# Patient Record
Sex: FEMALE | Race: BLACK OR AFRICAN AMERICAN | HISPANIC OR LATINO | Employment: FULL TIME | URBAN - METROPOLITAN AREA
[De-identification: names, ages, dates, MRNs, and addresses within clinical notes are randomized per-mention and may not be internally consistent; named-entity substitution may affect disease eponyms.]

---

## 2017-01-06 ENCOUNTER — ROUTINE PRENATAL (OUTPATIENT)
Dept: OBGYN | Facility: CLINIC | Age: 22
End: 2017-01-06
Payer: MEDICAID

## 2017-01-06 VITALS — DIASTOLIC BLOOD PRESSURE: 60 MMHG | WEIGHT: 179 LBS | BODY MASS INDEX: 31.21 KG/M2 | SYSTOLIC BLOOD PRESSURE: 110 MMHG

## 2017-01-06 DIAGNOSIS — Z34.81 ENCOUNTER FOR SUPERVISION OF OTHER NORMAL PREGNANCY IN FIRST TRIMESTER: Primary | ICD-10-CM

## 2017-01-06 DIAGNOSIS — O28.3 ABNORMAL PREGNANCY US: ICD-10-CM

## 2017-01-06 PROCEDURE — 90471 IMMUNIZATION ADMIN: CPT | Performed by: PHYSICIAN ASSISTANT

## 2017-01-06 PROCEDURE — 90715 TDAP VACCINE 7 YRS/> IM: CPT | Performed by: PHYSICIAN ASSISTANT

## 2017-01-06 PROCEDURE — 90040 PR PRENATAL FOLLOW UP: CPT | Performed by: PHYSICIAN ASSISTANT

## 2017-01-06 NOTE — MR AVS SNAPSHOT
Ronit Tripathi   2017 3:30 PM   Routine Prenatal   MRN: [id number]    [facility]   Dept Phone:  [phone]    Description:  Female : 1995   Provider:  ZIA Diaz           Allergies as of 2017     No Known Allergies      You were diagnosed with     Encounter for supervision of other normal pregnancy in first trimester   [6675256]  -  Primary     Abnormal pregnancy US   [[id number]]         Vital Signs     Blood Pressure Weight Last Menstrual Period Smoking Status          110/60 mmHg 81.194 kg (179 lb) 2016 Former Smoker        Basic Information     Date Of Birth Sex Race Ethnicity Preferred Language    1995 Female Other, Black or   Origin (Serbian,Citizen of [place],Bermudian,P[place]ua [place]inean, etc) English      Problem List              ICD-10-CM Priority Class Noted - Resolved    Supervision of normal pregnancy Z34.90   10/14/2014 - Present    Abnormal pregnancy US-bean choroid plexus cysts, referral to [facility] O28.3   2016 - Present      Health Maintenance        Date Due Completion Dates    IMM HEP B VACCINE (1 of 3 - Primary Series) 1995 ---    IMM HEP A VACCINE (1 of 2 - Standard Series) 5/3/1994 ---    IMM HPV VACCINE (1 of 3 - Female 3 Dose Series) 5/3/2004 ---    IMM VARICELLA (CHICKENPOX) VACCINE (1 of 2 - 2 Dose Adolescent Series) 5/3/2006 ---    IMM INFLUENZA (1) 2010    PAP SMEAR 2019, 10/14/2014, 2010    IMM DTaP/Tdap/Td Vaccine (3 - Td) 2026, 3/6/2015, 2012, 2010            Current Immunizations     Influenza TIV (IM) 2010 11:15 PM    Pneumococcal polysaccharide vaccine (PPSV-23) 2015 12:57 AM    Tdap Vaccine  Incomplete, 2016  1:08 AM, 3/6/2015  3:50 PM, 2012  2:00 PM, 2010 11:15 PM      Below and/or attached are the medications your provider expects you to take. Review all of your home medications and newly ordered medications with your  provider and/or pharmacist. Follow medication instructions as directed by your provider and/or pharmacist. Please keep your medication list with you and share with your provider. Update the information when medications are discontinued, doses are changed, or new medications (including over-the-counter products) are added; and carry medication information at all times in the event of emergency situations     Allergies:  No Known Allergies          Medications  Valid as of: January 06, 2017 -  4:09 PM    Generic Name Brand Name Tablet Size Instructions for use    BuPROPion HCl (TABLET SR 12 HR) WELLBUTRIN- MG Take 1 Tab by mouth 2 times a day.        Docusate Sodium (Cap)  MG Take 100 mg by mouth 2 times a day as needed for Constipation.        Docusate Sodium (Cap) COLACE 100 MG Take 1 Cap by mouth 2 times a day.        Ferrous Sulfate (Tab) ferrous sulfate 325 (65 FE) MG Take 1 Tab by mouth every morning with breakfast.        Ibuprofen (Tab) MOTRIN 600 MG Take 1 Tab by mouth every 6 hours as needed (Cramping).        MetroNIDAZOLE (Tab) FLAGYL 500 MG Patient to take 2gm PO x one dose.        Oxycodone-Acetaminophen (Tab) PERCOCET 5-325 MG Take 1-2 Tabs by mouth every four hours as needed for Moderate Pain.        Prenatal Multivit-Min-Fe-FA (Tab) Prenatal Vitamins 0.8 MG Take 1 Tab by mouth every day.        Prenatal MV-Min-Fe Fum-FA-DHA   Take  by mouth.        .                 Medicines prescribed today were sent to:     [facility]/PHARMACY #[id number] - [place], [place] - [address]    [address] [facility] [address]    Phone: [phone] Fax: [phone]    Open 24 Hours?: No      Medication refill instructions:       If your prescription bottle indicates you have medication refills left, it is not necessary to call your provider’s office. Please contact your pharmacy and they will refill your medication.    If your prescription bottle indicates you do not have any refills left, you may request  refills at any time through one of the following ways: The online [employer] system (except Urgent Care), by calling your provider’s office, or by asking your pharmacy to contact your provider’s office with a refill request. Medication refills are processed only during regular business hours and may not be available until the next business day. Your provider may request additional information or to have a follow-up visit with you prior to refilling your medication.   *Please Note: Medication refills are assigned a new Rx number when refilled electronically. Your pharmacy may indicate that no refills were authorized even though a new prescription for the same medication is available at the pharmacy. Please request the medicine by name with the pharmacy before contacting your provider for a refill.        Other Notes About Your Plan     GIRL - Estela           MyChart Status: Patient Declined

## 2017-01-06 NOTE — PROGRESS NOTES
OB f/u. + fetal movement.  No VB, LOF or UC's.  C/o on lower abdominal pain, sides  Wt:  179lb     BP: 110/60  Good phone # [phone]  Preferred pharmacy confirmed.  Tdap offered  BTL signed   1 GTT not done yet. Will do Monday.  Kick count sheet given today.  PANN appt not yet scheduled. Per P[facility] they left msg on 11/16/16 but patient did not call. Next opening not until February

## 2017-01-06 NOTE — Clinical Note
"Count Your Baby's Movements  Another step to a healthy delivery    Ronit Tripathi             Dept: [phone]    How Many Weeks Pregnant? 28w6d    Date to Begin Countin17              How to use this chart    One way for your physician to keep track of your baby's health is by knowing how often the baby moves (or \"kicks\") in your womb.  You can help your physician to do this by using this chart every day.    Every day, you should see how many hours it takes for your baby to move 10 times.  Start in the morning, as soon as you get up.    · First, write down the time your baby moves until you get to 10.  · Check off one box every time your baby moves until you get to 10.  · Write down the time you finished counting in the last column.  · Total how long it took to count up all 10 movements.  · Finally, fill in the box that shows how long this took.  After counting 10 movements, you no longer have to count any more that day.  The next morning, just start counting again as soon as you get up.    What should you call a \"movement\"?  It is hard to say, because it will feel different from one mother to another and from one pregnancy to the next.  The important thing is that you count the movements the same way throughout your pregnancy.  If you have more questions, you should ask your physician.    Count carefully every day!  SAMPLE:  Week 28    How many hours did it take to feel 10 movements?       Start  Time     1     2     3     4     5     6     7     8     9     10   Finish Time   Mon 8:20 ·  ·  ·  ·  ·  ·  ·  ·  ·  ·  11:40                  Sat               Sun                 IMPORTANT: You should contact your physician if it takes more than two hours for you to feel 10 movements.  Each morning, write down the time and start to count the movements of your baby.  Keep track by checking off one box every time you feel one movement.  When you have felt " "10 \"kicks\", write down the time you finished counting in the last column.  Then fill in the   box (over the check li) for the number of hours it took.  Be sure to read the complete instructions on the previous page.            "

## 2017-01-07 NOTE — PROGRESS NOTES
Pt has no complaints with cramping, UCs, Vb, LOF, but pt is feeling a lot of pressure and pain in hips and low back. +FM - FKC sheet given today. TDaP given today. BTL consent signed today - pt is SURE she wants this. No appt made with PANN, so pt urged to call again and make appt for Feb. Pt will do. Pt also to do 1hr GTT next week. RTC 3 wk or sooner prn.

## 2017-01-24 ENCOUNTER — HOSPITAL ENCOUNTER (OUTPATIENT)
Dept: LAB | Facility: MEDICAL CENTER | Age: 22
End: 2017-01-24
Attending: PHYSICIAN ASSISTANT
Payer: MEDICAID

## 2017-01-24 DIAGNOSIS — O28.3 ABNORMAL PREGNANCY US: ICD-10-CM

## 2017-01-24 LAB
GLUCOSE 1H P 50 G GLC PO SERPL-MCNC: 107 MG/DL (ref 70–139)
HCT VFR BLD AUTO: 38.2 % (ref 37–47)
HGB BLD-MCNC: 11.8 G/DL (ref 12–16)
TREPONEMA PALLIDUM IGG+IGM AB [PRESENCE] IN SERUM OR PLASMA BY IMMUNOASSAY: NON REACTIVE

## 2017-01-24 PROCEDURE — 86780 TREPONEMA PALLIDUM: CPT

## 2017-01-24 PROCEDURE — 82950 GLUCOSE TEST: CPT

## 2017-01-24 PROCEDURE — 85018 HEMOGLOBIN: CPT

## 2017-01-24 PROCEDURE — 36415 COLL VENOUS BLD VENIPUNCTURE: CPT

## 2017-01-24 PROCEDURE — 85014 HEMATOCRIT: CPT

## 2017-01-26 ENCOUNTER — ROUTINE PRENATAL (OUTPATIENT)
Dept: OBGYN | Facility: CLINIC | Age: 22
End: 2017-01-26
Payer: MEDICAID

## 2017-01-26 DIAGNOSIS — O28.3 ABNORMAL PREGNANCY US: ICD-10-CM

## 2017-01-26 DIAGNOSIS — Z34.83 ENCOUNTER FOR SUPERVISION OF OTHER NORMAL PREGNANCY IN THIRD TRIMESTER: Primary | ICD-10-CM

## 2017-01-26 DIAGNOSIS — O36.5990 IUGR, ANTENATAL: ICD-10-CM

## 2017-01-26 PROCEDURE — 90040 PR PRENATAL FOLLOW UP: CPT | Performed by: NURSE PRACTITIONER

## 2017-01-26 NOTE — MR AVS SNAPSHOT
Ronit Tripathi   2017 3:45 PM   Routine Prenatal   MRN: [id number]    Department[facility]   Dept Phone:  [phone]    Description:  Female : 1995   Provider:  Anuradha Lopez C.N.M.           Allergies as of 2017     No Known Allergies      You were diagnosed with     Encounter for supervision of other normal pregnancy in third trimester   [2608625]  -  Primary     Abnormal pregnancy US   [[id number]]         Vital Signs     Blood Pressure Weight Last Menstrual Period Smoking Status          108/74 mmHg 81.647 kg (180 lb) 2016 Former Smoker        Basic Information     Date Of Birth Sex Race Ethnicity Preferred Language    1995 Female Other, Black or   Origin (English,Papua [place]inean,Tajik,Alexy, etc) English      Problem List              ICD-10-CM Priority Class Noted - Resolved    Supervision of normal pregnancy Z34.90   10/14/2014 - Present    Abnormal pregnancy US-bean choroid plexus cysts, referral to PANN O28.3   2016 - Present      Health Maintenance        Date Due Completion Dates    IMM HEP B VACCINE (1 of 3 - Primary Series) 1995 ---    IMM HEP A VACCINE (1 of 2 - Standard Series) 5/3/1994 ---    IMM HPV VACCINE (1 of 3 - Female 3 Dose Series) 5/3/2004 ---    IMM VARICELLA (CHICKENPOX) VACCINE (1 of 2 - 2 Dose Adolescent Series) 5/3/2006 ---    IMM INFLUENZA (1) 2010    PAP SMEAR 2019, 10/14/2014, 2010    IMM DTaP/Tdap/Td Vaccine (3 - Td) 2027, 2016, 3/6/2015, 2012, 2010            Current Immunizations     Influenza TIV (IM) 2010 11:15 PM    Pneumococcal polysaccharide vaccine (PPSV-23) 2015 12:57 AM    Tdap Vaccine 2017  4:07 PM, 2016  1:08 AM, 3/6/2015  3:50 PM, 2012  2:00 PM, 2010 11:15 PM      Below and/or attached are the medications your provider expects you to take. Review all of your home medications and newly ordered  medications with your provider and/or pharmacist. Follow medication instructions as directed by your provider and/or pharmacist. Please keep your medication list with you and share with your provider. Update the information when medications are discontinued, doses are changed, or new medications (including over-the-counter products) are added; and carry medication information at all times in the event of emergency situations     Allergies:  No Known Allergies          Medications  Valid as of: January 26, 2017 -  4:23 PM    Generic Name Brand Name Tablet Size Instructions for use    .                 Medicines prescribed today were sent to:     [facility]/PHARMACY #[id number] - [place], [place] - [address]    [address] [facility] [address]    Phone: [phone] Fax: [phone]    Open 24 Hours?: No      Medication refill instructions:       If your prescription bottle indicates you have medication refills left, it is not necessary to call your provider’s office. Please contact your pharmacy and they will refill your medication.    If your prescription bottle indicates you do not have any refills left, you may request refills at any time through one of the following ways: The online [employer] system (except Urgent Care), by calling your provider’s office, or by asking your pharmacy to contact your provider’s office with a refill request. Medication refills are processed only during regular business hours and may not be available until the next business day. Your provider may request additional information or to have a follow-up visit with you prior to refilling your medication.   *Please Note: Medication refills are assigned a new Rx number when refilled electronically. Your pharmacy may indicate that no refills were authorized even though a new prescription for the same medication is available at the pharmacy. Please request the medicine by name with the pharmacy before contacting your provider for a refill.           Other Notes About Your Plan     GIRL - Estela           MyChart Status: Patient Declined

## 2017-01-26 NOTE — PROGRESS NOTES
Pt here today for OB follow up  Pt states no complaints.   Reports +FM  WT: 180lb  BP: 108/74  Good # [phone]  Pt states was told by cecelia, they had no referral.

## 2017-01-27 NOTE — PROGRESS NOTES
S:  Pt is  at 31w5d for routine OB follow up.  No c/o.  Reports good FM.  Denies VB, LOF, RUCs or vaginal DC. Has not yet made appt w [facility].      O:  Please see above vitals.        FHTs: 143        Fundal ht: 31 cm.    A:  IUP at 31w5d  Patient Active Problem List    Diagnosis Date Noted   • Abnormal pregnancy US-bean choroid plexus cysts, referral to [facility] 2016   • Supervision of normal pregnancy 10/14/2014        P:  1.  GBS @ 35 wks.          2.  Continue FKCs.          3.  Questions answered.          4.  Encouraged pt to tour L&D.          5.  Encourage adequate water intake.        6.  F/u 2 wks.        7.  Labs are wnl.

## 2017-01-27 NOTE — PATIENT INSTRUCTIONS
P:  1.  GBS @ 35 wks.          2.  Continue FKCs.          3.  Questions answered.          4.  Encouraged pt to tour L&D.          5.  Encourage adequate water intake.        6.  F/u 2 wks.        7.  Labs are wnl.

## 2017-02-08 VITALS — DIASTOLIC BLOOD PRESSURE: 74 MMHG | WEIGHT: 180 LBS | SYSTOLIC BLOOD PRESSURE: 108 MMHG | BODY MASS INDEX: 31.38 KG/M2

## 2017-02-08 PROBLEM — O36.5990 IUGR, ANTENATAL: Status: ACTIVE | Noted: 2017-02-08

## 2017-02-14 ENCOUNTER — ROUTINE PRENATAL (OUTPATIENT)
Dept: OBGYN | Facility: CLINIC | Age: 22
End: 2017-02-14
Payer: MEDICAID

## 2017-02-14 DIAGNOSIS — Z34.83 ENCOUNTER FOR SUPERVISION OF OTHER NORMAL PREGNANCY IN THIRD TRIMESTER: Primary | ICD-10-CM

## 2017-02-14 PROCEDURE — 90040 PR PRENATAL FOLLOW UP: CPT | Performed by: PHYSICIAN ASSISTANT

## 2017-02-14 NOTE — MR AVS SNAPSHOT
Ronit Tripathi   2017 3:15 PM   Routine Prenatal   MRN: [id number]    [facility]  [facility]   Dept Phone:  [phone]    Description:  Female : 1995   Provider:  ZIA Diaz           Allergies as of 2017     No Known Allergies      Vital Signs     Blood Pressure Weight Last Menstrual Period Smoking Status          106/60 mmHg 81.647 kg (180 lb) 2016 Former Smoker        Basic Information     Date Of Birth Sex Race Ethnicity Preferred Language    1995 Female Other, Black or   Origin (Gabonese,Emirati,Norwegian,Alexy, etc) English      Problem List              ICD-10-CM Priority Class Noted - Resolved    Supervision of normal pregnancy Z34.90   10/14/2014 - Present    possible IUGR in development - PANN ff-up on 3/3/17 P05.9   2017 - Present      Health Maintenance        Date Due Completion Dates    IMM HEP B VACCINE (1 of 3 - Primary Series) 1995 ---    IMM HEP A VACCINE (1 of 2 - Standard Series) 5/3/1994 ---    IMM HPV VACCINE (1 of 3 - Female 3 Dose Series) 5/3/2004 ---    IMM VARICELLA (CHICKENPOX) VACCINE (1 of 2 - 2 Dose Adolescent Series) 5/3/2006 ---    IMM INFLUENZA (1) 2010    PAP SMEAR 2019, 10/14/2014, 2010    IMM DTaP/Tdap/Td Vaccine (3 - Td) 2027, 2016, 3/6/2015, 2012, 2010            Current Immunizations     Influenza TIV (IM) 2010 11:15 PM    Pneumococcal polysaccharide vaccine (PPSV-23) 2015 12:57 AM    Tdap Vaccine 2017  4:07 PM, 2016  1:08 AM, 3/6/2015  3:50 PM, 2012  2:00 PM, 2010 11:15 PM      Below and/or attached are the medications your provider expects you to take. Review all of your home medications and newly ordered medications with your provider and/or pharmacist. Follow medication instructions as directed by your provider and/or pharmacist. Please keep your medication list with you and share with your  provider. Update the information when medications are discontinued, doses are changed, or new medications (including over-the-counter products) are added; and carry medication information at all times in the event of emergency situations     Allergies:  No Known Allergies          Medications  Valid as of: February 14, 2017 -  3:31 PM    Generic Name Brand Name Tablet Size Instructions for use    .                 Medicines prescribed today were sent to:     [facility]/PHARMACY #[id number] - [place], [place] - [address]    [address] [facility] [place] [address]    Phone: [phone] Fax: [phone]    Open 24 Hours?: No      Medication refill instructions:       If your prescription bottle indicates you have medication refills left, it is not necessary to call your provider’s office. Please contact your pharmacy and they will refill your medication.    If your prescription bottle indicates you do not have any refills left, you may request refills at any time through one of the following ways: The online [employer] system (except Urgent Care), by calling your provider’s office, or by asking your pharmacy to contact your provider’s office with a refill request. Medication refills are processed only during regular business hours and may not be available until the next business day. Your provider may request additional information or to have a follow-up visit with you prior to refilling your medication.   *Please Note: Medication refills are assigned a new Rx number when refilled electronically. Your pharmacy may indicate that no refills were authorized even though a new prescription for the same medication is available at the pharmacy. Please request the medicine by name with the pharmacy before contacting your provider for a refill.        Other Notes About Your Plan     [facility] - [facility] Status: Patient Declined

## 2017-02-14 NOTE — PROGRESS NOTES
Pt has no complaints with cramping, UCs, VB, LOF. PANN appt shows suspected IUGR with AC lag on 2/6, so has f/u for growth in 3 wks. No mention of choroid plexus cysts or other anomalies. +FM. GBS next visit. PTL precautions reviewed. RTC 2 wk or sooner prn.

## 2017-02-14 NOTE — PROGRESS NOTES
Pt here today for OB follow up  Pt states no complaints   Reports +FM  WT:180lb  BP:106/60  Good # [phone]

## 2017-02-28 ENCOUNTER — ROUTINE PRENATAL (OUTPATIENT)
Dept: OBGYN | Facility: CLINIC | Age: 22
End: 2017-02-28
Payer: MEDICAID

## 2017-02-28 ENCOUNTER — HOSPITAL ENCOUNTER (OUTPATIENT)
Facility: MEDICAL CENTER | Age: 22
End: 2017-02-28
Attending: OBSTETRICS & GYNECOLOGY
Payer: MEDICAID

## 2017-02-28 VITALS — WEIGHT: 179 LBS | SYSTOLIC BLOOD PRESSURE: 126 MMHG | DIASTOLIC BLOOD PRESSURE: 80 MMHG | BODY MASS INDEX: 31.21 KG/M2

## 2017-02-28 DIAGNOSIS — Z34.83 ENCOUNTER FOR SUPERVISION OF OTHER NORMAL PREGNANCY IN THIRD TRIMESTER: ICD-10-CM

## 2017-02-28 PROCEDURE — 87653 STREP B DNA AMP PROBE: CPT

## 2017-02-28 PROCEDURE — 90040 PR PRENATAL FOLLOW UP: CPT | Performed by: OBSTETRICS & GYNECOLOGY

## 2017-02-28 NOTE — PROGRESS NOTES
21 y.o.  36w3d The patient is here for routine obstetrical followup. She is without complaints and reports good fetal movement. She denies vaginal bleeding or leaking of fluid.  C/o frequent contractions since last night.     The patient's pregnancy is complicated by   Patient Active Problem List    Diagnosis Date Noted   • possible IUGR in development - PANN ff-up on 3/3/17 2017   • Supervision of normal pregnancy 10/14/2014     GBS obtained.   Has f/u growth US with PANN on 3/3/17    Pt requests stripping of membranes.  I counseled her that this is not indicated, as she is still .    Followup in 1 week  Labor precautions were discussed with patient  Fetal kick counts were discussed with patient

## 2017-02-28 NOTE — MR AVS SNAPSHOT
Ronit Tripathi   2017 3:00 PM   Routine Prenatal   MRN: [id number]    Department[facility]  [facility]   Dept Phone:  [phone]    Description:  Female : 1995   Provider:  Martina Luu M.D.           Allergies as of 2017     No Known Allergies      You were diagnosed with     Encounter for supervision of other normal pregnancy in third trimester   [9859858]         Vital Signs     Blood Pressure Weight Last Menstrual Period Smoking Status          126/80 mmHg 81.194 kg (179 lb) 2016 Former Smoker        Basic Information     Date Of Birth Sex Race Ethnicity Preferred Language    1995 Female Other, Black or   Origin (Thai,Turkmen,Uzbek,Alexy, etc) English      Problem List              ICD-10-CM Priority Class Noted - Resolved    Supervision of normal pregnancy Z34.90   10/14/2014 - Present    possible IUGR in development - PANN ff-up on 3/3/17 P05.9   2017 - Present      Health Maintenance        Date Due Completion Dates    IMM HEP B VACCINE (1 of 3 - Primary Series) 1995 ---    IMM HEP A VACCINE (1 of 2 - Standard Series) 5/3/1994 ---    IMM HPV VACCINE (1 of 3 - Female 3 Dose Series) 5/3/2004 ---    IMM VARICELLA (CHICKENPOX) VACCINE (1 of 2 - 2 Dose Adolescent Series) 5/3/2006 ---    IMM INFLUENZA (1) 2010    PAP SMEAR 2019, 10/14/2014, 2010    IMM DTaP/Tdap/Td Vaccine (3 - Td) 2027, 2016, 3/6/2015, 2012, 2010            Current Immunizations     Influenza TIV (IM) 2010 11:15 PM    Pneumococcal polysaccharide vaccine (PPSV-23) 2015 12:57 AM    Tdap Vaccine 2017  4:07 PM, 2016  1:08 AM, 3/6/2015  3:50 PM, 2012  2:00 PM, 2010 11:15 PM      Below and/or attached are the medications your provider expects you to take. Review all of your home medications and newly ordered medications with your provider and/or pharmacist. Follow medication  instructions as directed by your provider and/or pharmacist. Please keep your medication list with you and share with your provider. Update the information when medications are discontinued, doses are changed, or new medications (including over-the-counter products) are added; and carry medication information at all times in the event of emergency situations     Allergies:  No Known Allergies          Medications  Valid as of: February 28, 2017 -  3:40 PM    Generic Name Brand Name Tablet Size Instructions for use    .                 Medicines prescribed today were sent to:     [facility]/PHARMACY #[id number] - [place], [place] - [address]    [address] [facility] [place] [address]    Phone: [phone] Fax: [phone]    Open 24 Hours?: No      Medication refill instructions:       If your prescription bottle indicates you have medication refills left, it is not necessary to call your provider’s office. Please contact your pharmacy and they will refill your medication.    If your prescription bottle indicates you do not have any refills left, you may request refills at any time through one of the following ways: The online [employer] system (except Urgent Care), by calling your provider’s office, or by asking your pharmacy to contact your provider’s office with a refill request. Medication refills are processed only during regular business hours and may not be available until the next business day. Your provider may request additional information or to have a follow-up visit with you prior to refilling your medication.   *Please Note: Medication refills are assigned a new Rx number when refilled electronically. Your pharmacy may indicate that no refills were authorized even though a new prescription for the same medication is available at the pharmacy. Please request the medicine by name with the pharmacy before contacting your provider for a refill.        Your To Do List     Future Labs/Procedures Complete By Expires     GRP B STREP, BY PCR (HAMMOND BROTH)  As directed 2/28/2018    Comments:    Source: vaginal/rectal      Other Notes About Your Plan     GIRL - Estela           MyChart Status: Patient Declined

## 2017-02-28 NOTE — PROGRESS NOTES
OB f/u. + fetal movement.  No VB, LOF  c/o UC's 5 minutes apart this morning.  Good phone # [phone]  Preferred pharmacy confirmed.  GBS today

## 2017-03-02 LAB — GP B STREP DNA SPEC QL NAA+PROBE: NEGATIVE

## 2017-03-07 ENCOUNTER — ROUTINE PRENATAL (OUTPATIENT)
Dept: OBGYN | Facility: CLINIC | Age: 22
End: 2017-03-07
Payer: MEDICAID

## 2017-03-07 VITALS — BODY MASS INDEX: 31.38 KG/M2 | DIASTOLIC BLOOD PRESSURE: 70 MMHG | SYSTOLIC BLOOD PRESSURE: 120 MMHG | WEIGHT: 180 LBS

## 2017-03-07 DIAGNOSIS — H00.11 CHALAZION OF RIGHT UPPER EYELID: ICD-10-CM

## 2017-03-07 PROCEDURE — 90040 PR PRENATAL FOLLOW UP: CPT | Performed by: NURSE PRACTITIONER

## 2017-03-07 NOTE — PROGRESS NOTES
Pt here for OB/FU Reports Good Fetal Movement.  GBS Neg   Pt states she was told by us that she had a PANN appt on 3/3/17, when she showed up for that appt she was told by [facility] that her appt was on 3/1/17-so she was rescheduled for 3/14/17.  Pt c/o frequent U/Cs, denies any other complications.   # [phone]

## 2017-03-07 NOTE — PROGRESS NOTES
Has been kyara quite a bit more today.  Recommend she get something to eat, take a long walk and if increase in contractions go to hospital.  Continue daily kick counts.

## 2017-03-08 ENCOUNTER — HOSPITAL ENCOUNTER (OUTPATIENT)
Facility: MEDICAL CENTER | Age: 22
End: 2017-03-08
Attending: OBSTETRICS & GYNECOLOGY | Admitting: OBSTETRICS & GYNECOLOGY
Payer: MEDICAID

## 2017-03-08 VITALS
BODY MASS INDEX: 29.99 KG/M2 | TEMPERATURE: 97.7 F | SYSTOLIC BLOOD PRESSURE: 134 MMHG | WEIGHT: 180 LBS | HEIGHT: 65 IN | HEART RATE: 112 BPM | DIASTOLIC BLOOD PRESSURE: 79 MMHG

## 2017-03-08 PROCEDURE — 59025 FETAL NON-STRESS TEST: CPT | Performed by: NURSE PRACTITIONER

## 2017-03-09 NOTE — PROGRESS NOTES
2052: Pt presents to L&D for contractions.  External monitors x2 applied.  VSS.  SVE 4/80/-2 by CLAY Silva.  2155: SVE unchanged.  Report to RAJI Lopez CNM, pt can be discharged home with discharge instructions.  Discharge instructions given to pt, all questions answered.

## 2017-03-13 ENCOUNTER — HOSPITAL ENCOUNTER (INPATIENT)
Facility: MEDICAL CENTER | Age: 22
LOS: 2 days | End: 2017-03-15
Attending: OBSTETRICS & GYNECOLOGY | Admitting: OBSTETRICS & GYNECOLOGY
Payer: MEDICAID

## 2017-03-13 LAB
APPEARANCE UR: CLEAR
BASOPHILS # BLD AUTO: 0.4 % (ref 0–1.8)
BASOPHILS # BLD: 0.08 K/UL (ref 0–0.12)
COLOR UR AUTO: YELLOW
EOSINOPHIL # BLD AUTO: 0.04 K/UL (ref 0–0.51)
EOSINOPHIL NFR BLD: 0.2 % (ref 0–6.9)
ERYTHROCYTE [DISTWIDTH] IN BLOOD BY AUTOMATED COUNT: 38.4 FL (ref 35.9–50)
GLUCOSE UR QL STRIP.AUTO: NEGATIVE MG/DL
HCT VFR BLD AUTO: 35.5 % (ref 37–47)
HGB BLD-MCNC: 11.3 G/DL (ref 12–16)
HOLDING TUBE BB 8507: NORMAL
IMM GRANULOCYTES # BLD AUTO: 0.17 K/UL (ref 0–0.11)
IMM GRANULOCYTES NFR BLD AUTO: 0.8 % (ref 0–0.9)
KETONES UR QL STRIP.AUTO: NEGATIVE MG/DL
LEUKOCYTE ESTERASE UR QL STRIP.AUTO: ABNORMAL
LYMPHOCYTES # BLD AUTO: 2.55 K/UL (ref 1–4.8)
LYMPHOCYTES NFR BLD: 11.4 % (ref 22–41)
MCH RBC QN AUTO: 24.1 PG (ref 27–33)
MCHC RBC AUTO-ENTMCNC: 31.8 G/DL (ref 33.6–35)
MCV RBC AUTO: 75.7 FL (ref 81.4–97.8)
MONOCYTES # BLD AUTO: 1.54 K/UL (ref 0–0.85)
MONOCYTES NFR BLD AUTO: 6.9 % (ref 0–13.4)
NEUTROPHILS # BLD AUTO: 18.06 K/UL (ref 2–7.15)
NEUTROPHILS NFR BLD: 80.3 % (ref 44–72)
NITRITE UR QL STRIP.AUTO: NEGATIVE
NRBC # BLD AUTO: 0 K/UL
NRBC BLD AUTO-RTO: 0 /100 WBC
PH UR STRIP.AUTO: 7 [PH]
PLATELET # BLD AUTO: 310 K/UL (ref 164–446)
PMV BLD AUTO: 11.3 FL (ref 9–12.9)
PROT UR QL STRIP: NEGATIVE MG/DL
RBC # BLD AUTO: 4.69 M/UL (ref 4.2–5.4)
RBC UR QL AUTO: NEGATIVE
SP GR UR: 1.01
WBC # BLD AUTO: 22.4 K/UL (ref 4.8–10.8)

## 2017-03-13 PROCEDURE — 304965 HCHG RECOVERY SERVICES

## 2017-03-13 PROCEDURE — 81002 URINALYSIS NONAUTO W/O SCOPE: CPT

## 2017-03-13 PROCEDURE — 59409 OBSTETRICAL CARE: CPT

## 2017-03-13 PROCEDURE — 303615 HCHG EPIDURAL/SPINAL ANESTHESIA FOR LABOR

## 2017-03-13 PROCEDURE — 36415 COLL VENOUS BLD VENIPUNCTURE: CPT

## 2017-03-13 PROCEDURE — 700111 HCHG RX REV CODE 636 W/ 250 OVERRIDE (IP)

## 2017-03-13 PROCEDURE — A9270 NON-COVERED ITEM OR SERVICE: HCPCS | Performed by: OBSTETRICS & GYNECOLOGY

## 2017-03-13 PROCEDURE — 700111 HCHG RX REV CODE 636 W/ 250 OVERRIDE (IP): Performed by: OBSTETRICS & GYNECOLOGY

## 2017-03-13 PROCEDURE — 10907ZC DRAINAGE OF AMNIOTIC FLUID, THERAPEUTIC FROM PRODUCTS OF CONCEPTION, VIA NATURAL OR ARTIFICIAL OPENING: ICD-10-PCS | Performed by: OBSTETRICS & GYNECOLOGY

## 2017-03-13 PROCEDURE — 770002 HCHG ROOM/CARE - OB PRIVATE (112)

## 2017-03-13 PROCEDURE — 85025 COMPLETE CBC W/AUTO DIFF WBC: CPT

## 2017-03-13 PROCEDURE — 700102 HCHG RX REV CODE 250 W/ 637 OVERRIDE(OP): Performed by: OBSTETRICS & GYNECOLOGY

## 2017-03-13 RX ORDER — ROPIVACAINE HYDROCHLORIDE 2 MG/ML
INJECTION, SOLUTION EPIDURAL; INFILTRATION; PERINEURAL
Status: COMPLETED
Start: 2017-03-13 | End: 2017-03-13

## 2017-03-13 RX ORDER — ONDANSETRON 2 MG/ML
4 INJECTION INTRAMUSCULAR; INTRAVENOUS EVERY 6 HOURS PRN
Status: DISCONTINUED | OUTPATIENT
Start: 2017-03-13 | End: 2017-03-15 | Stop reason: HOSPADM

## 2017-03-13 RX ORDER — ONDANSETRON 4 MG/1
4 TABLET, ORALLY DISINTEGRATING ORAL EVERY 6 HOURS PRN
Status: DISCONTINUED | OUTPATIENT
Start: 2017-03-13 | End: 2017-03-15 | Stop reason: HOSPADM

## 2017-03-13 RX ORDER — METHYLERGONOVINE MALEATE 0.2 MG/ML
0.2 INJECTION INTRAVENOUS
Status: COMPLETED | OUTPATIENT
Start: 2017-03-13 | End: 2017-03-14

## 2017-03-13 RX ORDER — OXYCODONE HYDROCHLORIDE 5 MG/1
5 TABLET ORAL ONCE
Status: COMPLETED | OUTPATIENT
Start: 2017-03-13 | End: 2017-03-13

## 2017-03-13 RX ORDER — IBUPROFEN 600 MG/1
600 TABLET ORAL EVERY 6 HOURS PRN
Status: DISCONTINUED | OUTPATIENT
Start: 2017-03-13 | End: 2017-03-15 | Stop reason: HOSPADM

## 2017-03-13 RX ORDER — CARBOPROST TROMETHAMINE 250 UG/ML
250 INJECTION, SOLUTION INTRAMUSCULAR
Status: DISCONTINUED | OUTPATIENT
Start: 2017-03-13 | End: 2017-03-15 | Stop reason: HOSPADM

## 2017-03-13 RX ORDER — OXYCODONE HYDROCHLORIDE AND ACETAMINOPHEN 5; 325 MG/1; MG/1
1 TABLET ORAL EVERY 4 HOURS PRN
Status: DISCONTINUED | OUTPATIENT
Start: 2017-03-13 | End: 2017-03-15 | Stop reason: HOSPADM

## 2017-03-13 RX ORDER — MISOPROSTOL 200 UG/1
600 TABLET ORAL
Status: COMPLETED | OUTPATIENT
Start: 2017-03-13 | End: 2017-03-14

## 2017-03-13 RX ORDER — SODIUM CHLORIDE, SODIUM LACTATE, POTASSIUM CHLORIDE, CALCIUM CHLORIDE 600; 310; 30; 20 MG/100ML; MG/100ML; MG/100ML; MG/100ML
INJECTION, SOLUTION INTRAVENOUS CONTINUOUS
Status: DISCONTINUED | OUTPATIENT
Start: 2017-03-13 | End: 2017-03-13 | Stop reason: HOSPADM

## 2017-03-13 RX ORDER — SODIUM CHLORIDE, SODIUM LACTATE, POTASSIUM CHLORIDE, CALCIUM CHLORIDE 600; 310; 30; 20 MG/100ML; MG/100ML; MG/100ML; MG/100ML
INJECTION, SOLUTION INTRAVENOUS PRN
Status: DISCONTINUED | OUTPATIENT
Start: 2017-03-13 | End: 2017-03-15 | Stop reason: HOSPADM

## 2017-03-13 RX ORDER — MISOPROSTOL 200 UG/1
800 TABLET ORAL
Status: DISCONTINUED | OUTPATIENT
Start: 2017-03-13 | End: 2017-03-13 | Stop reason: HOSPADM

## 2017-03-13 RX ORDER — OXYCODONE HYDROCHLORIDE AND ACETAMINOPHEN 5; 325 MG/1; MG/1
2 TABLET ORAL EVERY 4 HOURS PRN
Status: DISCONTINUED | OUTPATIENT
Start: 2017-03-13 | End: 2017-03-15 | Stop reason: HOSPADM

## 2017-03-13 RX ORDER — CARBOPROST TROMETHAMINE 250 UG/ML
250 INJECTION, SOLUTION INTRAMUSCULAR
Status: DISCONTINUED | OUTPATIENT
Start: 2017-03-13 | End: 2017-03-13 | Stop reason: HOSPADM

## 2017-03-13 RX ORDER — DOCUSATE SODIUM 100 MG/1
100 CAPSULE, LIQUID FILLED ORAL 2 TIMES DAILY PRN
Status: DISCONTINUED | OUTPATIENT
Start: 2017-03-13 | End: 2017-03-15 | Stop reason: HOSPADM

## 2017-03-13 RX ORDER — METHYLERGONOVINE MALEATE 0.2 MG/ML
0.2 INJECTION INTRAVENOUS
Status: DISCONTINUED | OUTPATIENT
Start: 2017-03-13 | End: 2017-03-13 | Stop reason: HOSPADM

## 2017-03-13 RX ORDER — VITAMIN A ACETATE, BETA CAROTENE, ASCORBIC ACID, CHOLECALCIFEROL, .ALPHA.-TOCOPHEROL ACETATE, DL-, THIAMINE MONONITRATE, RIBOFLAVIN, NIACINAMIDE, PYRIDOXINE HYDROCHLORIDE, FOLIC ACID, CYANOCOBALAMIN, CALCIUM CARBONATE, FERROUS FUMARATE, ZINC OXIDE, CUPRIC OXIDE 3080; 12; 120; 400; 1; 1.84; 3; 20; 22; 920; 25; 200; 27; 10; 2 [IU]/1; UG/1; MG/1; [IU]/1; MG/1; MG/1; MG/1; MG/1; MG/1; [IU]/1; MG/1; MG/1; MG/1; MG/1; MG/1
1 TABLET, FILM COATED ORAL EVERY MORNING
Status: DISCONTINUED | OUTPATIENT
Start: 2017-03-14 | End: 2017-03-15 | Stop reason: HOSPADM

## 2017-03-13 RX ORDER — ACETAMINOPHEN 325 MG/1
650 TABLET ORAL ONCE
Status: COMPLETED | OUTPATIENT
Start: 2017-03-13 | End: 2017-03-13

## 2017-03-13 RX ADMIN — SODIUM CHLORIDE, POTASSIUM CHLORIDE, SODIUM LACTATE AND CALCIUM CHLORIDE: 600; 310; 30; 20 INJECTION, SOLUTION INTRAVENOUS at 14:00

## 2017-03-13 RX ADMIN — IBUPROFEN 600 MG: 600 TABLET, FILM COATED ORAL at 19:18

## 2017-03-13 RX ADMIN — OXYCODONE HYDROCHLORIDE AND ACETAMINOPHEN 2 TABLET: 5; 325 TABLET ORAL at 20:26

## 2017-03-13 RX ADMIN — Medication 125 ML/HR: at 18:31

## 2017-03-13 RX ADMIN — SODIUM CHLORIDE, POTASSIUM CHLORIDE, SODIUM LACTATE AND CALCIUM CHLORIDE: 600; 310; 30; 20 INJECTION, SOLUTION INTRAVENOUS at 14:47

## 2017-03-13 RX ADMIN — Medication 20 UNITS: at 17:39

## 2017-03-13 RX ADMIN — ROPIVACAINE HYDROCHLORIDE 200 MG: 2 INJECTION, SOLUTION EPIDURAL; INFILTRATION at 15:05

## 2017-03-13 RX ADMIN — SODIUM CHLORIDE, POTASSIUM CHLORIDE, SODIUM LACTATE AND CALCIUM CHLORIDE: 600; 310; 30; 20 INJECTION, SOLUTION INTRAVENOUS at 16:55

## 2017-03-13 RX ADMIN — ACETAMINOPHEN 650 MG: 325 TABLET, FILM COATED ORAL at 17:53

## 2017-03-13 ASSESSMENT — COPD QUESTIONNAIRES
DURING THE PAST 4 WEEKS HOW MUCH DID YOU FEEL SHORT OF BREATH: NONE/LITTLE OF THE TIME
COPD SCREENING SCORE: 0
COPD SCREENING SCORE: 0
HAVE YOU SMOKED AT LEAST 100 CIGARETTES IN YOUR ENTIRE LIFE: NO/DON'T KNOW
HAVE YOU SMOKED AT LEAST 100 CIGARETTES IN YOUR ENTIRE LIFE: NO/DON'T KNOW
DURING THE PAST 4 WEEKS HOW MUCH DID YOU FEEL SHORT OF BREATH: NONE/LITTLE OF THE TIME
DO YOU EVER COUGH UP ANY MUCUS OR PHLEGM?: NO/ONLY WITH OCCASIONAL COLDS OR INFECTIONS
DO YOU EVER COUGH UP ANY MUCUS OR PHLEGM?: NO/ONLY WITH OCCASIONAL COLDS OR INFECTIONS

## 2017-03-13 ASSESSMENT — PATIENT HEALTH QUESTIONNAIRE - PHQ9
1. LITTLE INTEREST OR PLEASURE IN DOING THINGS: NOT AT ALL
SUM OF ALL RESPONSES TO PHQ QUESTIONS 1-9: 0
SUM OF ALL RESPONSES TO PHQ9 QUESTIONS 1 AND 2: 0
2. FEELING DOWN, DEPRESSED, IRRITABLE, OR HOPELESS: NOT AT ALL

## 2017-03-13 ASSESSMENT — LIFESTYLE VARIABLES
EVER_SMOKED: YES
ALCOHOL_USE: NO
DO YOU DRINK ALCOHOL: NO

## 2017-03-13 ASSESSMENT — PAIN SCALES - GENERAL
PAINLEVEL_OUTOF10: 6
PAINLEVEL_OUTOF10: 8

## 2017-03-13 NOTE — IP AVS SNAPSHOT
3/15/2017          Ronit Tripathi  [address] [place] [address] [place] [address]    Dear Ronit:    [facility] wants to ensure your discharge home is safe and you or your loved ones have had all your questions answered regarding your care after you leave the hospital.    You may receive a telephone call within two days of your discharge.  This call is to make certain you understand your discharge instructions as well as ensure we provided you with the best care possible during your stay with us.     The call will only last approximately 3-5 minutes and will be done by a nurse.    Once again, we want to ensure your discharge home is safe and that you have a clear understanding of any next steps in your care.  If you have any questions or concerns, please do not hesitate to contact us, we are here for you.  Thank you for choosing [facility] for your healthcare needs.    Sincerely,    Sam Howe    [facility]

## 2017-03-13 NOTE — PROGRESS NOTES
1140: Presents with painful UCs q 5 min that started at 0400 today. Denies LOF or VB; reports decreased FM. States she only ate a donut for breakfast. SVE 3/70/-2; water provided and tilted on left side  1240: Pt hasn't had an acceleration; Dr. Ortiz still in OR  1300: Report given to Dr. Ortiz; orders to recheck cervix  1320: 4/70/-2 and kyara more painfully and frequently. Admit orders obtained from Zahra Ortiz  1400: Admission procedures completed and then transferred to 222

## 2017-03-13 NOTE — IP AVS SNAPSHOT
RescueTime Access Code: Activation code not generated  Current RescueTime Status: Patient Declined    Ask The DoctorharWhimseybox  A secure, online tool to manage your health information     [employer]’s RescueTime® is a secure, online tool that connects you to your personalized health information from the privacy of your home -- day or night - making it very easy for you to manage your healthcare. Once the activation process is completed, you can even access your medical information using the RescueTime chichi, which is available for free in the Apple Chichi store or Google Play store.     RescueTime provides the following levels of access (as shown below):   My Chart Features   [facility] Primary Care Doctor [facility]  Specialists [facility]  Urgent  Care Non-[facility]  Primary Care  Doctor   Email your healthcare team securely and privately 24/7 X X X X   Manage appointments: schedule your next appointment; view details of past/upcoming appointments X      Request prescription refills. X      View recent personal medical records, including lab and immunizations X X X X   View health record, including health history, allergies, medications X X X X   Read reports about your outpatient visits, procedures, consult and ER notes X X X X   See your discharge summary, which is a recap of your hospital and/or ER visit that includes your diagnosis, lab results, and care plan. X X       How to register for RescueTime:  1. Go to  [email or web address].  2. Click on the Sign Up Now box, which takes you to the New Member Sign Up page. You will need to provide the following information:  a. Enter your RescueTime Access Code exactly as it appears at the top of this page. (You will not need to use this code after you’ve completed the sign-up process. If you do not sign up before the expiration date, you must request a new code.)   b. Enter your date of birth.   c. Enter your home email address.   d. Click Submit, and follow the next screen’s instructions.  3. Create a RescueTime ID.  This will be your [employer] login ID and cannot be changed, so think of one that is secure and easy to remember.  4. Create a E[employer] password. You can change your password at any time.  5. Enter your Password Reset Question and Answer. This can be used at a later time if you forget your password.   6. Enter your e-mail address. This allows you to receive e-mail notifications when new information is available in [employer].  7. Click Sign Up. You can now view your health information.    For assistance activating your [employer] account, call [phone]

## 2017-03-13 NOTE — IP AVS SNAPSHOT
Home Care Instructions                                                                                                                Ronit Tripathi   MRN: [id number]    Department:  POST PARTUM 31   3/13/2017           Your appointments     2017  1:15 PM   Post Partum with NIHARIKA Lubin   [facility] ([facility])    [address] [facility] [address]  [place] [address]   [phone]              Follow-up Information     1. Follow up with [facility] In 5 weeks.    Specialty:  OB/Gyn    Why:  for post partum check    Contact information    [address] [facility] [address] [facility] [address]  [place] [address]  [phone]    Additional information:    From  [address], take the [facility] exit [address]) and head West on [address].   Just before [facility] [facility], take the slight left fork onto [address].   [facility] is located on the right hand side, just past [address] [facility].       I assume responsibility for securing a follow-up  Screening blood test on my baby within the specified date range.    -                  Discharge Instructions       POSTPARTUM DISCHARGE INSTRUCTIONS FOR MOM    YOB: 1995   Age: 21 y.o.               Admit Date: 3/13/2017     Discharge Date: 3/15/2017  Attending Doctor:  Olga Ortiz M.D.                  Allergies:  Review of patient's allergies indicates no known allergies.    Discharged to home by car. Discharged via wheelchair, hospital escort: Yes.  Special equipment needed: Not Applicable  Belongings with: Personal  Be sure to schedule a follow-up appointment with your primary care doctor or any specialists as instructed.     Discharge Plan:   Diet Plan: Discussed  Activity Level: Discussed  Smoking Cessation Offered: Patient Counseled  Confirmed Follow up Appointment: Patient to Call and Schedule Appointment  Confirmed Symptoms Management: Discussed  Medication Reconciliation Updated: Yes  Influenza Vaccine Indication: Indicated: 9 to 64 years of  "age    REASONS TO CALL YOUR OBSTETRICIAN:  1.   Persistent fever or shaking chills (Temperature higher than 100.4)  2.   Heavy bleeding (soaking more than 1 pad per hour); Passing clots  3.   Foul odor from vagina  4.   Mastitis (Breast infection; breast pain, chills, fever, redness)  5.   Urinary pain, burning or frequency  6.   Episiotomy infection  7.   Abdominal incision infection  8.   Severe depression longer than 24 hours    HAND WASHING  · Prior to handling the baby.  · Before breastfeeding or bottle feeding baby.  · After using the bathroom or changing the baby's diaper.      VAGINAL CARE  · Nothing inside vagina for 6 weeks: no sexual intercourse, tampons or douching.  · Bleeding may continue for 2-4 weeks.  Amount may vary.    · Call your physician for heavy bleeding which means soaking more than 1 pad per hour    BIRTH CONTROL  · It is possible to become pregnant at any time after delivery and while breastfeeding.  · Plan to discuss a method of birth control with your physician at your follow up visit. visit.    DIET AND ELIMINATION  · Eating more fiber (bran cereal, fruits, and vegetables) and drinking plenty of fluids will help to avoid constipation.  · Urinary frequency after childbirth is normal.    POSTPARTUM BLUES  During the first few days after birth, you may experience a sense of the \"blues\" which may include impatience, irritability or even crying.  These feeling come and go quickly.  However, as many as 1 in 10 women experience emotional symptoms known as postpartum depression.    Postpartum depression:  May start as early as the second or third day after delivery or take several weeks or months to develop.  Symptoms of \"blues\" are present, but are more intense:  Crying spells; loss of appetite; feelings of hopelessness or loss of control; fear of touching the baby; over concern or no concern at all about the baby; little or no concern about your own appearance/caring for yourself; and/or " "inability to sleep or excessive sleeping.  Contact your physician if you are experiencing any of these symptoms.    Crisis Hotline:  · [place] Crisis Hotline:  0-339-MGJZDVM  Or 1-257.301.3515  · [place] Crisis Hotline:  1-701.927.4311  Or 070-923-2390    PREVENTING SHAKEN BABY:  If you are angry or stressed, PUT THE BABY IN THE CRIB, step away, take some deep breaths, and wait until you are calm to care for the baby.  DO NOT SHAKE THE BABY.  You are not alone, call a supporter for help.    · Crisis Call Center 24/7 crisis line 469-318-0222 or 1-346.646.2091  · You can also text them, text \"ANSWER\" to 052440    QUIT SMOKING/TOBACCO USE:  I understand the use of any tobacco products increases my chance of suffering from future heart disease and could cause other illnesses which may shorten my life. Quitting the use of tobacco products is the single most important thing I can do to improve my health. For further information on smoking / tobacco cessation call a Toll Free Quit Line at 1-658.158.8100 (*[employer] [place]) or 1-995.949.8035 (American Lung Association) or you can access the web based program at www.lungusa.org.    · [place] Tobacco Users Help Line:  (143) 433-5606       Toll Free: 1-140.918.6455  · Quit Tobacco Program [facility] (722)136-3346    DEPRESSION / SUICIDE RISK:  As you are discharged from this [facility], it is important to learn how to keep safe from harming yourself.    Recognize the warning signs:  · Abrupt changes in personality, positive or negative- including increase in energy   · Giving away possessions  · Change in eating patterns- significant weight changes-  positive or negative  · Change in sleeping patterns- unable to sleep or sleeping all the time   · Unwillingness or inability to communicate  · Depression  · Unusual sadness, discouragement and loneliness  · Talk of wanting to die  · Neglect of personal appearance   · Rebelliousness- " reckless behavior  · Withdrawal from people/activities they love  · Confusion- inability to concentrate     If you or a loved one observes any of these behaviors or has concerns about self-harm, here's what you can do:  · Talk about it- your feelings and reasons for harming yourself  · Remove any means that you might use to hurt yourself (examples: pills, rope, extension cords, firearm)  · Get professional help from the community (Mental Health, Substance Abuse, psychological counseling)  · Do not be alone:Call your Safe Contact- someone whom you trust who will be there for you.  · Call your local CRISIS HOTLINE 336-8874 or 748-149-3530  · Call your local Children's Mobile Crisis Response Team [place] (839) 971-8523 or www.Leg[employer] Elf  · Call the toll free National Suicide Prevention Hotlines   · National Suicide Prevention Lifeline 129-483-WTNA (5307)  · National Hope Line Network 800-SUICIDE (637-0450)    DISCHARGE SURVEY:  Thank you for choosing [facility].  We hope we provided you with very good care.  You may be receiving a survey in the mail.  Please fill it out.  Your opinion is valuable to us.    ADDITIONAL EDUCATIONAL MATERIALS GIVEN TO PATIENT:        My signature on this form indicates that:  1.  I have reviewed and understand the above information  2.  My questions regarding this information have been answered to my satisfaction.  3.  I have formulated a plan with my discharge nurse to obtain my prescribed medication for home.         Discharge Medication Instructions:    Below are the medications your physician expects you to take upon discharge:    Review all your home medications and newly ordered medications with your doctor and/or pharmacist. Follow medication instructions as directed by your doctor and/or pharmacist.    Please keep your medication list with you and share with your physician.               Medication List      START taking these medications        Instructions     bisacodyl 10 MG Supp   Commonly known as:  DULCOLAX    Insert 1 Suppository in rectum every day.   Dose:  10 mg        MG Caps    Take 100 mg by mouth 2 times a day as needed for Constipation.   Dose:  100 mg       ferrous sulfate 325 (65 FE) MG tablet   Last time this was given:  325 mg on 3/15/2017  7:28 AM    Take 1 Tab by mouth 2 times a day, with meals.   Dose:  325 mg       ibuprofen 600 MG Tabs   Last time this was given:  600 mg on 3/15/2017  6:05 AM   Commonly known as:  MOTRIN    Take 1 Tab by mouth every 6 hours as needed (For cramping after delivery; do not give if patient is receiving ketorolac (Toradol)).   Dose:  600 mg       prenatal plus vitamin 27-1 MG Tabs tablet   Last time this was given:  1 Tab on 3/15/2017  7:28 AM    Take 1 Tab by mouth every morning.   Dose:  1 Tab               Crisis Hotline:     [place] Crisis Hotline:  0-683-MRCEKEI or 1-686.913.9963    [place] Crisis Hotline:    1-277.978.9300 or 817-882-1527        Disclaimer           _____________________________________                     __________       ________       Patient/Mother Signature or Legal                          Date                   Time

## 2017-03-13 NOTE — H&P
History and Physical      Ronit Tripathi is a 21 y.o. female  at 38w2d who presents for contractions    Subjective:   positive  For CTXS.   negative Feels pain   negative for LOF  negative for vaginal bleeding.   positive for fetal movement    ROS: A comprehensive review of systems was negative.    Past Medical History   Diagnosis Date   • Anemia    • Nausea and vomiting in pregnancy      ,   • Chalazion of right upper eyelid 3/7/2017   • Pregnant      History reviewed. No pertinent past surgical history.  OB History    Para Term  AB SAB TAB Ectopic Multiple Living   11 3 3 0 7 0 2 0  3      # Outcome Date GA Lbr Ke/2nd Weight Sex Delivery Anes PTL Lv   11 Current            10 Term 04/07/15 40w0d  3.062 kg (6 lb 12 oz) M Vag-Spont None Y Y      Comments: Pt states no complications   9 TAB 2014     TAB         Comments: NO COMPLICATIONS   8 AB  6w0d             Comments: Pt states no complications   7 TAB 2012     TAB         Comments: NO COMPLICATIONS   6 Term 12 39w5d  2.92 kg (6 lb 7 oz) F Vag-Spont EPI N Y      Comments: Pt states no complications   5 AB  8w0d             Comments: Pt. states wasnt ready for a baby.   4 Term 12/28/10 38w3d  2.693 kg (5 lb 15 oz) F Vag-Spont EPI Y Y      Comments: Pt. states no complications.   3 AB  8w0d             Comments: Pt. states couldnt afford the baby so decided to have    2 AB  6w0d             Comments: no compl, some regret, feels mother forced her.     1 AB  6w0d             Comments: no compl, still feels some regret, feels forced by mother.        Social History     Social History   • Marital Status: Single     Spouse Name: N/A   • Number of Children: N/A   • Years of Education: N/A     Occupational History   • Not on file.     Social History Main Topics   • Smoking status: Former Smoker -- 1 years     Quit date: 2016   • Smokeless tobacco: Not on file      Comment: Stopped smoking  "once she found out she was pregnant   • Alcohol Use: No   • Drug Use: No   • Sexual Activity:     Partners: Male     Birth Control/ Protection: Condom      Comment: Unplanne pregnancy     Other Topics Concern   • Not on file     Social History Narrative    ** Merged History Encounter **          Allergies: Review of patient's allergies indicates no known allergies.    Current facility-administered medications:   •  LR infusion, , Intravenous, Continuous, Olga Ortiz M.D.  •  fentaNYL (SUBLIMAZE) injection 50 mcg, 50 mcg, Intravenous, Q HOUR PRN, Olga Ortiz M.D.  •  fentaNYL (SUBLIMAZE) injection 100 mcg, 100 mcg, Intravenous, Q HOUR PRN, Olga Ortiz M.D.  •  citric acid-sodium citrate (BICITRA) 334-500 MG/5ML solution 30 mL, 30 mL, Oral, Q6HRS PRN, Olga Ortiz M.D.  •  misoprostol (CYTOTEC) tablet 800 mcg, 800 mcg, Rectal, Once PRN, Olga Ortiz M.D.  •  methylergonovine (METHERGINE) injection 0.2 mg, 0.2 mg, Intramuscular, Once PRN, Olga Ortiz M.D.  •  carboPROST (HEMABATE) injection 250 mcg, 250 mcg, Intramuscular, Once PRN, Olga Ortiz M.D.    Prenatal care with [facility] starting at 12 weeks with following problems:  Patient Active Problem List    Diagnosis Date Noted   • Labor and delivery, indication for care 03/13/2017   • Chalazion of right upper eyelid 03/07/2017   • possible IUGR in [facility] - PANN ff-up on 3/3/17 02/08/2017   • Supervision of normal pregnancy 10/14/2014               Objective:      Blood pressure 130/74, pulse 100, temperature 37.4 °C (99.4 °F), height 1.6 m (5' 3\"), weight 81.647 kg (180 lb), last menstrual period 06/18/2016, unknown if currently breastfeeding.    General:   no acute distress   Skin:   normal   HEENT:  Neck supple with midline trachea   Lungs:   CTA bilateral   Heart:   S1, S2 normal, no murmur, click, rub or gallop, regular rate and rhythm, brisk carotid upstroke without bruits, peripheral pulses very brisk, chest is clear " without rales or wheezing, no pedal edema, no JVD, no hepatosplenomegaly   Abdomen:   gravid, NT   EFW:  7 lb   Pelvis:  adequate with gynecoid pelvis   FHT:  130 BPM   Uterine Size: S=D   Presentations: Cephalic   Cervix:     Dilation: 4 cm    Effacement: 75%    Station:  -1    Consistency: Medium    Position: Middle     Lab Review  Lab:   Blood type: A     Recent Results (from the past 5880 hour(s))   POCT Pregnancy    Collection Time: 08/09/16  2:13 PM   Result Value Ref Range    POC Urine Pregnancy Test positive Negative    Internal Control Positive Positive     Internal Control Negative Negative    POCT Urinalysis    Collection Time: 09/13/16 12:42 PM   Result Value Ref Range    POC Color  Negative    POC Appearance  Negative    POC Leukocyte Esterase 2+ Negative    POC Nitrites Neg Negative    POC Urobiligen  Negative (0.2) mg/dL    POC Protein Trace Negative mg/dL    POC Urine PH 7.0 5.0 - 8.0    POC Blood 3+ Negative    POC Specific Gravity 1.020 <1.005 - >1.030    POC Ketones Neg Negative mg/dL    POC Biliruben  Negative mg/dL    POC Glucose Neg Negative mg/dL   THINPREP RFLX HPV ASCUS W/CTNG    Collection Time: 09/13/16  1:51 PM   Result Value Ref Range    Cytology Reg See Path Report     Source Endo/Cervical     C. trachomatis by PCR Negative Negative    N. gonorrhoeae by PCR Negative Negative   PREG CNTR PRENATAL PN    Collection Time: 11/01/16 12:23 PM   Result Value Ref Range    WBC 12.6 (H) 4.8 - 10.8 K/uL    RBC 4.80 4.20 - 5.40 M/uL    Hemoglobin 13.1 12.0 - 16.0 g/dL    Hematocrit 39.8 37.0 - 47.0 %    MCV 82.9 81.4 - 97.8 fL    MCH 27.3 27.0 - 33.0 pg    MCHC 32.9 (L) 33.6 - 35.0 g/dL    RDW 38.7 35.9 - 50.0 fL    Platelet Count 289 164 - 446 K/uL    MPV 11.2 9.0 - 12.9 fL    Neutrophils-Polys 72.90 (H) 44.00 - 72.00 %    Lymphocytes 19.00 (L) 22.00 - 41.00 %    Monocytes 5.40 0.00 - 13.40 %    Eosinophils 1.70 0.00 - 6.90 %    Basophils 0.60 0.00 - 1.80 %    Immature Granulocytes 0.40 0.00 - 0.90  %    Nucleated RBC 0.00 /100 WBC    Neutrophils (Absolute) 9.14 (H) 2.00 - 7.15 K/uL    Lymphs (Absolute) 2.39 1.00 - 4.80 K/uL    Monos (Absolute) 0.68 0.00 - 0.85 K/uL    Eos (Absolute) 0.21 0.00 - 0.51 K/uL    Baso (Absolute) 0.08 0.00 - 0.12 K/uL    Immature Granulocytes (abs) 0.05 0.00 - 0.11 K/uL    NRBC (Absolute) 0.00 K/uL    Rubella IgG Antibody 64.00 IU/mL    Syphilis, Treponemal Qual Non Reactive Non Reactive    Micro Urine Req Microscopic     Hepatitis B Surface Antigen Negative Negative    Color Yellow     Character Cloudy (A)     Specific Gravity 1.025 <1.035    Ph 7.0 5.0-8.0    Glucose Negative Negative mg/dL    Ketones Negative Negative mg/dL    Protein 70 (A) Negative mg/dL    Bilirubin Negative Negative    Nitrite Negative Negative    Leukocyte Esterase Large (A) Negative    Occult Blood Negative Negative    Culture Indicated Yes UA Culture   HIV ANTIBODIES    Collection Time: 11/01/16 12:23 PM   Result Value Ref Range    HIV Ag/Ab Combo Assay Non Reactive Non Reactive   OP PRENATAL PANEL-BLOOD BANK    Collection Time: 11/01/16 12:23 PM   Result Value Ref Range    ABO Grouping Only A     Rh Grouping Only POS     Antibody Screen Scrn NEG    URINE MICROSCOPIC (W/UA)    Collection Time: 11/01/16 12:23 PM   Result Value Ref Range    WBC 2-5 /hpf    RBC 2-5 (A) /hpf    Bacteria Few (A) None /hpf    Epithelial Cells Few /hpf    Amorphous Crystal Present /hpf   URINE CULTURE(NEW)    Collection Time: 11/01/16 12:23 PM   Result Value Ref Range    Significant Indicator NEG     Source UR     Urine Culture       Mixed skin madisyn >100,000 cfu/mL Predominantly Lactobacillus  sp.     AFP TETRA    Collection Time: 11/01/16 12:24 PM   Result Value Ref Range    AFP Value -Eia 92 ng/mL    AFP MOM Value 1.88     Hcg Value 89469 IU/L    Hcg Mom 1.47     Ue3 Value 1.90 ng/mL    Ue3 Mom 1.01     Interpretation Normal     M[age] BLAYNE [age] yr    Gestational Age Based On LNMP     Gestational Age 19.43 weeks     Insulin Dependent Diabetes No     Race Other     Multiple Pregnancy One     Melinda Value -Eia 97 pg/mL    Melinda Mom Value 0.60     Maternal Weight 167 lbs    Err Maternal Scrn AFP See Note    GLUCOSE 1HR GESTATIONAL    Collection Time: 01/24/17 12:10 PM   Result Value Ref Range    Glucose, Post Dose 107 70 - 139 mg/dL   HCT    Collection Time: 01/24/17 12:10 PM   Result Value Ref Range    Hematocrit 38.2 37.0 - 47.0 %   HGB    Collection Time: 01/24/17 12:10 PM   Result Value Ref Range    Hemoglobin 11.8 (L) 12.0 - 16.0 g/dL   T.PALLIDUM AB EIA    Collection Time: 01/24/17 12:10 PM   Result Value Ref Range    Syphilis, Treponemal Qual Non Reactive Non Reactive   GRP B STREP, BY PCR (HAMMOND BROTH)    Collection Time: 02/28/17  4:20 PM   Result Value Ref Range    Strep Gp B DNA PCR Negative Negative   POC UA    Collection Time: 03/13/17  1:24 PM   Result Value Ref Range    POC Color Yellow     POC Appearance Clear     POC Glucose Negative Negative mg/dL    POC Ketones Negative Negative mg/dL    POC Specific Gravity 1.015 1.005-1.030    POC Blood Negative Negative    POC Urine PH 7.0 5.0-8.0    POC Protein Negative Negative mg/dL    POC Nitrites Negative Negative    POC Leukocyte Esterase Small (A) Negative   CBC WITH DIFFERENTIAL    Collection Time: 03/13/17  2:00 PM   Result Value Ref Range    WBC 22.4 (H) 4.8 - 10.8 K/uL    RBC 4.69 4.20 - 5.40 M/uL    Hemoglobin 11.3 (L) 12.0 - 16.0 g/dL    Hematocrit 35.5 (L) 37.0 - 47.0 %    MCV 75.7 (L) 81.4 - 97.8 fL    MCH 24.1 (L) 27.0 - 33.0 pg    MCHC 31.8 (L) 33.6 - 35.0 g/dL    RDW 38.4 35.9 - 50.0 fL    Platelet Count 310 164 - 446 K/uL    MPV 11.3 9.0 - 12.9 fL    Neutrophils-Polys 80.30 (H) 44.00 - 72.00 %    Lymphocytes 11.40 (L) 22.00 - 41.00 %    Monocytes 6.90 0.00 - 13.40 %    Eosinophils 0.20 0.00 - 6.90 %    Basophils 0.40 0.00 - 1.80 %    Immature Granulocytes 0.80 0.00 - 0.90 %    Nucleated RBC 0.00 /100 WBC    Neutrophils (Absolute) 18.06 (H) 2.00 - 7.15 K/uL     Lymphs (Absolute) 2.55 1.00 - 4.80 K/uL    Monos (Absolute) 1.54 (H) 0.00 - 0.85 K/uL    Eos (Absolute) 0.04 0.00 - 0.51 K/uL    Baso (Absolute) 0.08 0.00 - 0.12 K/uL    Immature Granulocytes (abs) 0.17 (H) 0.00 - 0.11 K/uL    NRBC (Absolute) 0.00 K/uL        Assessment:   Ronit Tripathi at 38w2d  Labor status: Active phase labor.  Obstetrical history significant for   Patient Active Problem List    Diagnosis Date Noted   • Labor and delivery, indication for care 03/13/2017   • Chalazion of right upper eyelid 03/07/2017   • possible IUGR in development - PANN ff-up on 3/3/17 02/08/2017   • Supervision of normal pregnancy 10/14/2014   .      Plan:     Admit to L&D  GBS negative

## 2017-03-14 LAB
ANISOCYTOSIS BLD QL SMEAR: ABNORMAL
BASOPHILS # BLD AUTO: 0.3 % (ref 0–1.8)
BASOPHILS # BLD AUTO: 0.3 % (ref 0–1.8)
BASOPHILS # BLD: 0.07 K/UL (ref 0–0.12)
BASOPHILS # BLD: 0.09 K/UL (ref 0–0.12)
COMMENT 1642: NORMAL
EOSINOPHIL # BLD AUTO: 0.17 K/UL (ref 0–0.51)
EOSINOPHIL # BLD AUTO: 0.18 K/UL (ref 0–0.51)
EOSINOPHIL NFR BLD: 0.6 % (ref 0–6.9)
EOSINOPHIL NFR BLD: 0.8 % (ref 0–6.9)
ERYTHROCYTE [DISTWIDTH] IN BLOOD BY AUTOMATED COUNT: 37.9 FL (ref 35.9–50)
ERYTHROCYTE [DISTWIDTH] IN BLOOD BY AUTOMATED COUNT: 39.5 FL (ref 35.9–50)
HCT VFR BLD AUTO: 26.6 % (ref 37–47)
HCT VFR BLD AUTO: 28.5 % (ref 37–47)
HGB BLD-MCNC: 8.4 G/DL (ref 12–16)
HGB BLD-MCNC: 8.9 G/DL (ref 12–16)
IMM GRANULOCYTES # BLD AUTO: 0.22 K/UL (ref 0–0.11)
IMM GRANULOCYTES # BLD AUTO: 0.25 K/UL (ref 0–0.11)
IMM GRANULOCYTES NFR BLD AUTO: 0.8 % (ref 0–0.9)
IMM GRANULOCYTES NFR BLD AUTO: 1.1 % (ref 0–0.9)
LYMPHOCYTES # BLD AUTO: 3.54 K/UL (ref 1–4.8)
LYMPHOCYTES # BLD AUTO: 5.82 K/UL (ref 1–4.8)
LYMPHOCYTES NFR BLD: 15.5 % (ref 22–41)
LYMPHOCYTES NFR BLD: 21.8 % (ref 22–41)
MCH RBC QN AUTO: 23.7 PG (ref 27–33)
MCH RBC QN AUTO: 23.9 PG (ref 27–33)
MCHC RBC AUTO-ENTMCNC: 31.2 G/DL (ref 33.6–35)
MCHC RBC AUTO-ENTMCNC: 31.6 G/DL (ref 33.6–35)
MCV RBC AUTO: 75.1 FL (ref 81.4–97.8)
MCV RBC AUTO: 76.6 FL (ref 81.4–97.8)
MICROCYTES BLD QL SMEAR: ABNORMAL
MONOCYTES # BLD AUTO: 1.22 K/UL (ref 0–0.85)
MONOCYTES # BLD AUTO: 1.42 K/UL (ref 0–0.85)
MONOCYTES NFR BLD AUTO: 4.6 % (ref 0–13.4)
MONOCYTES NFR BLD AUTO: 6.2 % (ref 0–13.4)
MORPHOLOGY BLD-IMP: NORMAL
NEUTROPHILS # BLD AUTO: 17.38 K/UL (ref 2–7.15)
NEUTROPHILS # BLD AUTO: 19.19 K/UL (ref 2–7.15)
NEUTROPHILS NFR BLD: 71.9 % (ref 44–72)
NEUTROPHILS NFR BLD: 76.1 % (ref 44–72)
NRBC # BLD AUTO: 0 K/UL
NRBC # BLD AUTO: 0 K/UL
NRBC BLD AUTO-RTO: 0 /100 WBC
NRBC BLD AUTO-RTO: 0 /100 WBC
OVALOCYTES BLD QL SMEAR: NORMAL
PLATELET # BLD AUTO: 227 K/UL (ref 164–446)
PLATELET # BLD AUTO: 260 K/UL (ref 164–446)
PLATELET BLD QL SMEAR: NORMAL
PMV BLD AUTO: 10.9 FL (ref 9–12.9)
PMV BLD AUTO: 11.2 FL (ref 9–12.9)
POIKILOCYTOSIS BLD QL SMEAR: NORMAL
RBC # BLD AUTO: 3.54 M/UL (ref 4.2–5.4)
RBC # BLD AUTO: 3.72 M/UL (ref 4.2–5.4)
RBC BLD AUTO: PRESENT
SCHISTOCYTES BLD QL SMEAR: NORMAL
WBC # BLD AUTO: 22.8 K/UL (ref 4.8–10.8)
WBC # BLD AUTO: 26.7 K/UL (ref 4.8–10.8)

## 2017-03-14 PROCEDURE — 700102 HCHG RX REV CODE 250 W/ 637 OVERRIDE(OP): Performed by: OBSTETRICS & GYNECOLOGY

## 2017-03-14 PROCEDURE — A9270 NON-COVERED ITEM OR SERVICE: HCPCS | Performed by: OBSTETRICS & GYNECOLOGY

## 2017-03-14 PROCEDURE — A9270 NON-COVERED ITEM OR SERVICE: HCPCS | Performed by: NURSE PRACTITIONER

## 2017-03-14 PROCEDURE — 770002 HCHG ROOM/CARE - OB PRIVATE (112)

## 2017-03-14 PROCEDURE — 36415 COLL VENOUS BLD VENIPUNCTURE: CPT

## 2017-03-14 PROCEDURE — 700102 HCHG RX REV CODE 250 W/ 637 OVERRIDE(OP): Performed by: NURSE PRACTITIONER

## 2017-03-14 PROCEDURE — 700111 HCHG RX REV CODE 636 W/ 250 OVERRIDE (IP)

## 2017-03-14 PROCEDURE — 85025 COMPLETE CBC W/AUTO DIFF WBC: CPT

## 2017-03-14 PROCEDURE — 700111 HCHG RX REV CODE 636 W/ 250 OVERRIDE (IP): Performed by: OBSTETRICS & GYNECOLOGY

## 2017-03-14 RX ORDER — METHYLERGONOVINE MALEATE 0.2 MG/1
0.2 TABLET ORAL EVERY 6 HOURS
Status: DISCONTINUED | OUTPATIENT
Start: 2017-03-14 | End: 2017-03-15

## 2017-03-14 RX ORDER — FERROUS SULFATE 325(65) MG
325 TABLET ORAL 2 TIMES DAILY WITH MEALS
Status: DISCONTINUED | OUTPATIENT
Start: 2017-03-14 | End: 2017-03-15 | Stop reason: HOSPADM

## 2017-03-14 RX ADMIN — Medication 325 MG: at 07:53

## 2017-03-14 RX ADMIN — METHYLERGONOVINE MALEATE 0.2 MG: 0.2 INJECTION INTRAMUSCULAR; INTRAVENOUS at 03:22

## 2017-03-14 RX ADMIN — OXYCODONE HYDROCHLORIDE AND ACETAMINOPHEN 2 TABLET: 5; 325 TABLET ORAL at 00:39

## 2017-03-14 RX ADMIN — METHYLERGONOVINE MALEATE 0.2 MG: 0.2 TABLET ORAL at 19:55

## 2017-03-14 RX ADMIN — OXYCODONE HYDROCHLORIDE AND ACETAMINOPHEN 2 TABLET: 5; 325 TABLET ORAL at 18:10

## 2017-03-14 RX ADMIN — OXYCODONE HYDROCHLORIDE AND ACETAMINOPHEN 2 TABLET: 5; 325 TABLET ORAL at 04:27

## 2017-03-14 RX ADMIN — Medication 325 MG: at 18:09

## 2017-03-14 RX ADMIN — MISOPROSTOL 600 MCG: 200 TABLET ORAL at 03:15

## 2017-03-14 RX ADMIN — OXYCODONE HYDROCHLORIDE AND ACETAMINOPHEN 1 TABLET: 5; 325 TABLET ORAL at 09:27

## 2017-03-14 RX ADMIN — IBUPROFEN 600 MG: 600 TABLET, FILM COATED ORAL at 22:06

## 2017-03-14 RX ADMIN — METHYLERGONOVINE MALEATE 0.2 MG: 0.2 TABLET ORAL at 07:53

## 2017-03-14 RX ADMIN — OXYCODONE HYDROCHLORIDE AND ACETAMINOPHEN 2 TABLET: 5; 325 TABLET ORAL at 22:06

## 2017-03-14 RX ADMIN — Medication 999 ML/HR: at 03:30

## 2017-03-14 RX ADMIN — Medication: at 05:45

## 2017-03-14 RX ADMIN — METHYLERGONOVINE MALEATE 0.2 MG: 0.2 TABLET ORAL at 13:54

## 2017-03-14 RX ADMIN — Medication 1 TABLET: at 07:53

## 2017-03-14 RX ADMIN — OXYCODONE HYDROCHLORIDE AND ACETAMINOPHEN 1 TABLET: 5; 325 TABLET ORAL at 13:54

## 2017-03-14 RX ADMIN — IBUPROFEN 600 MG: 600 TABLET, FILM COATED ORAL at 15:12

## 2017-03-14 ASSESSMENT — PAIN SCALES - GENERAL
PAINLEVEL_OUTOF10: 5
PAINLEVEL_OUTOF10: 5
PAINLEVEL_OUTOF10: 8
PAINLEVEL_OUTOF10: 3
PAINLEVEL_OUTOF10: 6
PAINLEVEL_OUTOF10: 6
PAINLEVEL_OUTOF10: 8
PAINLEVEL_OUTOF10: 4

## 2017-03-14 ASSESSMENT — COPD QUESTIONNAIRES
HAVE YOU SMOKED AT LEAST 100 CIGARETTES IN YOUR ENTIRE LIFE: NO/DON'T KNOW
COPD SCREENING SCORE: 0
DO YOU EVER COUGH UP ANY MUCUS OR PHLEGM?: NO/ONLY WITH OCCASIONAL COLDS OR INFECTIONS
DURING THE PAST 4 WEEKS HOW MUCH DID YOU FEEL SHORT OF BREATH: NONE/LITTLE OF THE TIME

## 2017-03-14 NOTE — PROGRESS NOTES
Assumed pt care. Assessment complete. VSS. Fundus firm, lochia light. Pt up to void, tolerated well. Call light within reach. Will continue to monitor. Clarified vitals with Dr. Moscoso. Pt on q 4 hr vitals.

## 2017-03-14 NOTE — PROGRESS NOTES
Post Partum Progress Note    Name:   Ronit Tripathi   Date/Time:  3/14/2017 - 7:08 AM  Chief Admitting Dx:  Pregnancy  Labor and delivery, indication for care  Delivery Type:  vaginal, spontaneous  Post-Op/Post Partum Days #:  1    Subjective:  Abdominal pain: yes  Ambulating:   yes  Tolerating liquids:  yes  Tolerating food:  yes common adult  Flatus:   yes  BM:    no  Bleeding:   with a small amount of bleeding  Voiding:   yes  Dizziness:   no  Feeding:   breast    Filed Vitals:    03/14/17 0429 03/14/17 0447 03/14/17 0505 03/14/17 0600   BP: 105/66 100/96 103/95 102/63   Pulse: 84 78 77 79   Temp:       TempSrc:       Resp:       Height:       Weight:       SpO2: 97% 94% 95% 94%       Exam:  Breast: Lactating yes  Abdomen: Abdomen soft, non-tender. BS normal. No masses,  No organomegaly  Fundal Tenderness:  no  Fundus Firm: yes  Incision: none  Below umbilicus: yes  Perineum: perineum intact  Lochia: mild  Extremities: Normal extremities, peripheral pulses and reflexes normal, no edema, redness or tenderness in the calves or thighs    Meds:  Current Facility-Administered Medications   Medication Dose   • methylergonovine (METHERGINE) tablet 0.2 mg  0.2 mg   • ondansetron (ZOFRAN ODT) dispertab 4 mg  4 mg    Or   • ondansetron (ZOFRAN) syringe/vial injection 4 mg  4 mg   • oxytocin (PITOCIN) infusion (for postpartum)   mL/hr   • ibuprofen (MOTRIN) tablet 600 mg  600 mg   • oxycodone-acetaminophen (PERCOCET) 5-325 MG per tablet 1 Tab  1 Tab   • oxycodone-acetaminophen (PERCOCET) 5-325 MG per tablet 2 Tab  2 Tab   • LR infusion     • PRN oxytocin (PITOCIN) (20 Units/1000 mL) PRN for excessive uterine bleeding - See Admin Instr  125-999 mL/hr   • carboPROST (HEMABATE) injection 250 mcg  250 mcg   • docusate sodium (COLACE) capsule 100 mg  100 mg   • prenatal plus vitamin (STUARTNATAL 1+1) 27-1 MG tablet 1 Tab  1 Tab       Labs:   Recent Labs      03/13/17   1400  03/14/17   0343   WBC  22.4*  26.7*   RBC   4.69  3.72*   HEMOGLOBIN  11.3*  8.9*   HEMATOCRIT  35.5*  28.5*   MCV  75.7*  76.6*   MCH  24.1*  23.9*   MCHC  31.8*  31.2*   RDW  38.4  39.5   PLATELETCT  310  260   MPV  11.3  10.9       Assessment:  Chief Admitting Dx:  Pregnancy  Labor and delivery, indication for care  Delivery Type:  vaginal, spontaneous  Tubal Ligation:  no    Plan:  Continue routine post partum care.  S/p PPH - repeat CBC @ 12noon  Start iron  Anticipate DC home on PPD#2    Anuradha Lopez C.N.M.

## 2017-03-14 NOTE — PROGRESS NOTES
"1700: Pt progressively feeling \"worse;' states she feels achy and \"not myself.\" Pt still only has a low-grade fever; amniotic fluid has faint odor. Dr. Ortiz notified; present for  of viable female at 1737. After delivery, pt felt SOB and excessively shaky; temp is only 99.7 orally; pulse ox applied WNL. Tylenol given. Pt started feeling better 30-40 min after delivery  ; Report given to Yoon WILLIAMSON  "

## 2017-03-14 NOTE — PROGRESS NOTES
Met with parents to discuss their feeding plan. Mom plans on breastfeeding. States she is not feeling well at this time and will start pumping today once she feels better, hopefully this afternoon.  On-going assistance offered.

## 2017-03-14 NOTE — PROGRESS NOTES
0345--this RN enters room to find nursing staff and CNMW dealing with a PP hemorrhage.  This RN steps in for support of patient as well as weighing pads to assess EBL.    0355--Clots retrieved from toilet to add to EBL numbers.  Fundal massage continues from CNMW.  Small number of clots noted at this time and small piece of possible membrane noted.  Fundus firming per CNMW.  0400--This RN requests straight cath and CNMW agrees.  Straight cath produces 175 ml clear yellow urine.  FOllowing cath, fundus found to be firm with massage per CNMW.  Orders to obtain VS every 15 minutes for first hour then hourly until CBC results return.

## 2017-03-14 NOTE — PROGRESS NOTES
S: Pt is feeling faint, was ambulating with assist of RN to bathroom and has begun bleeding heavily and passed some large clots.  I was called to assess pt.  S/p  on 3/13/17 @ 1737 without any perineal laceration or complication.  Pt has IV access, but no pitocin running.      O:    Filed Vitals:    17 0338 17 0345 17 0355 17 0410   BP: 118/77 115/75 109/65 105/66   Pulse: 120 95 109 79   Temp:       TempSrc:       Resp:       Height:       Weight:       SpO2:    96%     Fundus is boggy, bleeding moderate.  Bimanual massage done until 800mcg cytotec and 0.2 mg methergine given.  Pitocin given in IV fluid.  Bleeding slows some but uterus is intermittently boggy.  Fundal massage continues.  Stat CBC is drawn by lab.  Lacey pads, chucks, clots, etc are weighed by nurses for a hemorrhage total of around 1200mL.  Possible trailing membranes noted.  RN straight caths pt for 150mL clear yellow urine. FF and bleeding now quite slow. VS throughout have remained WNL.  EBL: 1200mL    A/P:    1.   s/p PPH  2.  Pending CBC  3.  Continue IV pitocin    4.  q15min vitals, then q1hr     Anuradha Lopez, PEARLM, APN  Dr. Ortiz -- attending physician

## 2017-03-14 NOTE — PROGRESS NOTES
0308: received call from Floor RN to assess pt.  Entered room and pt really wanted to go to the bathroom. Stand by assistance provided to pt to the bathroom. Pt sat on toilet and began to urinate. This RN heard 2 clots fall in toilet. Advised Floor RN to check MAR and get cytotec. This RN called floor CNA to bring the weigh scale and take VS.   0310: Floor RN returned with cytotec. Pt sitting on toilet stating she feels dizzy, faint, and nauseous. Pt looking pale. Advised floor RN to call CNM and notify her of pt symptoms. Tried to get pt to bed but pt was too dizzy.  0315: CNM arrives to room and requests pt to lay in bed to assess. FOB assisted this RN to get pt to bed.   0320: This RN talked to and comforted pt while CNM performed fundal massage and manual extraction of clots. Cytotec and methergine given, per CNM order. IV pitocin started.   0330: VS machine and scale brought in to weigh pads and chucks.   0335: this RN took VS, ordered another floor RN to write down VS. Stood by pt and talked and comforted her while CNM continued with fundal massage.   0345: NOC Supervisor entered pt room took over talking and comforting pt and removing soiled chucks to weigh.  EBL: 1354 mL  0355: pt stable. CNM orders q15 min VS checks for the next hour and hourly after that until CBC results are in.

## 2017-03-14 NOTE — PROGRESS NOTES
0305-Patient in bed. Patient put on call light to report that she has some gushing.     0307-This RN assessed patient, performed fundal rub and then called Charge, RN to also assess.     0308-Charge, RN arrived. Charge, RN Assessed patient and helped patient up to bathroom due to patient stating that she really needed to go to the bathroom.     0310-Spoke to SHAI Wahl to let her aware of the situation with patient. Patient's had bleed an estimate of 1000ml per Charge nurse assessment. Patient sat on toilet and stated that she felt dizzy and nauseas.     0315-CMN arrived. CMN ordered cytotec and methergine. CMN ordered CBC stat    0320-Retrieved hemorrhage kit and medications.     0330-Started Pitocin running wide open per CMN orders.     0345-ManagerСергей arrived.     0405-Patient stable. Vital signs ordered q15 for one hour, then q hour for every hour after.     0530-Spoke with CMN on update that patient's vital signs are still stable. Pitocin bag is finished. CMN ordered another bag of pitocin at 125ml/per hour.

## 2017-03-14 NOTE — DELIVERY NOTE
Vaginal Delivery Note    Ronit Tripathi is a  9, now para 4, who presented in active phase labor at 38w2d.  Patient progressed in active labor spontaneously to cervical exam 100%; cervical dilation 10; station -1 to complete the first stage of labor.  Patient then progressed through second stage and delivered spontaneously a viable female infant over an intact perineum.  Nuchal cord not present.  Infant Apgar 8 and 9, and weight pending.    During the third stage the placenta was delivered spontaneously and was intact with 3 vessels    Assisted extraction:  none    Perineum repair:  none    Analgesia: none    Epidural:  yes    Family support:  yes    Infant bonding:  excellent    Estimated blood loss:  200 mL    Sponge count correct:  yes    Patient tolerated the procedure:  excellent

## 2017-03-15 VITALS
WEIGHT: 180 LBS | HEIGHT: 63 IN | RESPIRATION RATE: 20 BRPM | OXYGEN SATURATION: 97 % | TEMPERATURE: 97.6 F | SYSTOLIC BLOOD PRESSURE: 79 MMHG | DIASTOLIC BLOOD PRESSURE: 59 MMHG | HEART RATE: 69 BPM | BODY MASS INDEX: 31.89 KG/M2

## 2017-03-15 PROCEDURE — 700102 HCHG RX REV CODE 250 W/ 637 OVERRIDE(OP): Performed by: OBSTETRICS & GYNECOLOGY

## 2017-03-15 PROCEDURE — A9270 NON-COVERED ITEM OR SERVICE: HCPCS | Performed by: NURSE PRACTITIONER

## 2017-03-15 PROCEDURE — A9270 NON-COVERED ITEM OR SERVICE: HCPCS | Performed by: OBSTETRICS & GYNECOLOGY

## 2017-03-15 PROCEDURE — 700102 HCHG RX REV CODE 250 W/ 637 OVERRIDE(OP): Performed by: NURSE PRACTITIONER

## 2017-03-15 RX ORDER — FERROUS SULFATE 325(65) MG
325 TABLET ORAL 2 TIMES DAILY WITH MEALS
Qty: 30 TAB | Refills: 3 | Status: SHIPPED | OUTPATIENT
Start: 2017-03-15 | End: 2019-07-16

## 2017-03-15 RX ORDER — VITAMIN A ACETATE, BETA CAROTENE, ASCORBIC ACID, CHOLECALCIFEROL, .ALPHA.-TOCOPHEROL ACETATE, DL-, THIAMINE MONONITRATE, RIBOFLAVIN, NIACINAMIDE, PYRIDOXINE HYDROCHLORIDE, FOLIC ACID, CYANOCOBALAMIN, CALCIUM CARBONATE, FERROUS FUMARATE, ZINC OXIDE, CUPRIC OXIDE 3080; 12; 120; 400; 1; 1.84; 3; 20; 22; 920; 25; 200; 27; 10; 2 [IU]/1; UG/1; MG/1; [IU]/1; MG/1; MG/1; MG/1; MG/1; MG/1; [IU]/1; MG/1; MG/1; MG/1; MG/1; MG/1
1 TABLET, FILM COATED ORAL EVERY MORNING
Qty: 30 TAB | Refills: 3 | Status: SHIPPED | OUTPATIENT
Start: 2017-03-15 | End: 2019-07-16

## 2017-03-15 RX ORDER — PSEUDOEPHEDRINE HCL 30 MG
100 TABLET ORAL 2 TIMES DAILY PRN
Qty: 60 CAP | Refills: 1 | Status: SHIPPED | OUTPATIENT
Start: 2017-03-15 | End: 2019-07-16

## 2017-03-15 RX ORDER — IBUPROFEN 600 MG/1
600 TABLET ORAL EVERY 6 HOURS PRN
Qty: 30 TAB | Refills: 1 | Status: SHIPPED | OUTPATIENT
Start: 2017-03-15 | End: 2019-11-12

## 2017-03-15 RX ORDER — BISACODYL 10 MG
10 SUPPOSITORY, RECTAL RECTAL DAILY
Qty: 5 SUPPOSITORY | Refills: 0 | Status: SHIPPED | OUTPATIENT
Start: 2017-03-15 | End: 2019-07-16

## 2017-03-15 RX ADMIN — Medication 1 TABLET: at 07:28

## 2017-03-15 RX ADMIN — OXYCODONE HYDROCHLORIDE AND ACETAMINOPHEN 2 TABLET: 5; 325 TABLET ORAL at 12:34

## 2017-03-15 RX ADMIN — OXYCODONE HYDROCHLORIDE AND ACETAMINOPHEN 2 TABLET: 5; 325 TABLET ORAL at 02:16

## 2017-03-15 RX ADMIN — METHYLERGONOVINE MALEATE 0.2 MG: 0.2 TABLET ORAL at 02:15

## 2017-03-15 RX ADMIN — IBUPROFEN 600 MG: 600 TABLET, FILM COATED ORAL at 06:05

## 2017-03-15 RX ADMIN — IBUPROFEN 600 MG: 600 TABLET, FILM COATED ORAL at 12:33

## 2017-03-15 RX ADMIN — Medication 325 MG: at 07:28

## 2017-03-15 RX ADMIN — OXYCODONE HYDROCHLORIDE AND ACETAMINOPHEN 1 TABLET: 5; 325 TABLET ORAL at 06:05

## 2017-03-15 ASSESSMENT — PAIN SCALES - GENERAL: PAINLEVEL_OUTOF10: 8

## 2017-03-15 NOTE — CARE PLAN
Problem: Altered physiologic condition related to immediate post-delivery state and potential for bleeding/hemorrhage  Goal: Patient physiologically stable as evidenced by normal lochia, palpable uterine involution and vital signs within normal limits  Outcome: PROGRESSING AS EXPECTED  Pt fundal assessment completed. Firm, light, and no active s/s of heavy bleeding. VSS    Problem: Potential for postpartum infection related to presence of episiotomy/vaginal tear and/or uterine contamination  Goal: Patient will be absent from signs and symptoms of infection  Outcome: PROGRESSING AS EXPECTED  Pt shows no s/s of infection. Vital signs and temperature WNL

## 2017-03-15 NOTE — PROGRESS NOTES
Assessment complete. VSS. Fundus firm, lochia light. Pt will call to request pain medications. FOB at bedside. States voiding without difficulty. POC discussed. Encouraged to call with needs. Call light in place

## 2017-03-15 NOTE — DISCHARGE SUMMARY
Discharge Summary:      Ronit Tripathi      Admit Date:   3/13/2017  Discharge Date:  3/15/2017     Admitting diagnosis:  Pregnancy  Labor and delivery, indication for care  Discharge Diagnosis: Status post vaginal, spontaneous.  Pregnancy Complications: none  Tubal Ligation:  no        History:  Past Medical History   Diagnosis Date   • Anemia    • Nausea and vomiting in pregnancy      ,   • Chalazion of right upper eyelid 3/7/2017   • Pregnant      OB History    Para Term  AB SAB TAB Ectopic Multiple Living   11 3 3 0 7 0 2 0  3      # Outcome Date GA Lbr Ke/2nd Weight Sex Delivery Anes PTL Lv   11 Current            10 Term 04/07/15 40w0d  3.062 kg (6 lb 12 oz) M Vag-Spont None Y Y      Comments: Pt states no complications   9 TAB 2014     TAB         Comments: NO COMPLICATIONS   8 AB  6w0d             Comments: Pt states no complications   7 TAB 2012     TAB         Comments: NO COMPLICATIONS   6 Term 12 39w5d  2.92 kg (6 lb 7 oz) F Vag-Spont EPI N Y      Comments: Pt states no complications   5 AB  8w0d             Comments: Pt. states wasnt ready for a baby.   4 Term 12/28/10 38w3d  2.693 kg (5 lb 15 oz) F Vag-Spont EPI Y Y      Comments: Pt. states no complications.   3 AB  8w0d             Comments: Pt. states couldnt afford the baby so decided to have    2 AB  6w0d             Comments: no compl, some regret, feels mother forced her.     1 AB  6w0d             Comments: no compl, still feels some regret, feels forced by mother.           Review of patient's allergies indicates no known allergies.  Patient Active Problem List    Diagnosis Date Noted   • Labor and delivery, indication for care 2017   • Chalazion of right upper eyelid 2017   • possible IUGR in development - PANN ff-up on 3/3/17 2017   • Supervision of normal pregnancy 10/14/2014        Hospital Course:   21 y.o. , now para 4, was admitted with the  above mentioned diagnosis, underwent Active Labor, vaginal, spontaneous. Patient postpartum course wascomplicated by PP hemorrhage requiring bimanual massage, cytotec and methergine and evacuation of clots with approximately 1200ml blood loss on PPD1. Bleeding then resolved.   Rest of course was unremarkable, with progressive advancement in diet , ambulation and toleration of oral analgesia. Patient without complaints today and desires discharge.      Filed Vitals:    03/14/17 1600 03/14/17 2000 03/15/17 0000 03/15/17 0400   BP: 111/65 105/68 103/64 91/58   Pulse: 73 81 75 72   Temp: 36.6 °C (97.9 °F) 36.6 °C (97.9 °F) 36.5 °C (97.7 °F) 36.7 °C (98.1 °F)   TempSrc:       Resp: 20 20 19 18   Height:       Weight:       SpO2: 99% 95% 95% 92%       Current Facility-Administered Medications   Medication Dose   • methylergonovine (METHERGINE) tablet 0.2 mg  0.2 mg   • ferrous sulfate tablet 325 mg  325 mg   • ondansetron (ZOFRAN ODT) dispertab 4 mg  4 mg    Or   • ondansetron (ZOFRAN) syringe/vial injection 4 mg  4 mg   • oxytocin (PITOCIN) infusion (for postpartum)   mL/hr   • ibuprofen (MOTRIN) tablet 600 mg  600 mg   • oxycodone-acetaminophen (PERCOCET) 5-325 MG per tablet 1 Tab  1 Tab   • oxycodone-acetaminophen (PERCOCET) 5-325 MG per tablet 2 Tab  2 Tab   • LR infusion     • PRN oxytocin (PITOCIN) (20 Units/1000 mL) PRN for excessive uterine bleeding - See Admin Instr  125-999 mL/hr   • carboPROST (HEMABATE) injection 250 mcg  250 mcg   • docusate sodium (COLACE) capsule 100 mg  100 mg   • prenatal plus vitamin (STUARTNATAL 1+1) 27-1 MG tablet 1 Tab  1 Tab       Exam:  Abdomen: Abdomen soft, non-tender. BS normal. No masses,  No organomegaly  Fundus Non Tender: yes  Incision: none  Extremity: extremities, peripheral pulses and reflexes normal     Labs:  Recent Labs      03/13/17   1400  03/14/17   0343  03/14/17   1214   WBC  22.4*  26.7*  22.8*   RBC  4.69  3.72*  3.54*   HEMOGLOBIN  11.3*  8.9*  8.4*    HEMATOCRIT  35.5*  28.5*  26.6*   MCV  75.7*  76.6*  75.1*   MCH  24.1*  23.9*  23.7*   MCHC  31.8*  31.2*  31.6*   RDW  38.4  39.5  37.9   PLATELETCT  310  260  227   MPV  11.3  10.9  11.2        Activity:   Discharge to home  Pelvic Rest x 6 weeks    Assessment:  Postpartum course complicated by PP hemorrhage requiring bimanual massage, cytotec and methergine and evacuation of clots with approximately 1200ml blood loss on PPD1. Bleeding then resolved.   Discharge Assessment: Taking adequate diet and fluids, No heavy bleeding or foul vaginal discharge , No breast redness or severe pain of breast     Follow up: .[facility] or [facility] in 5 weeks for vaginal; 1 week for incision check.   To resume daily PNV and iron supplement with hydration.   Patient to RT [facility] or ER if any of the following occur:  Fever over 100.5  Severe abdominal pain  Red streaks or painful masses in the breasts  Foul smelling discharge or lochia  Heavy vaginal bleeding saturating a pad per hour  S/s of PP depression     Discharge Meds:   Current Outpatient Prescriptions   Medication Sig Dispense Refill   • ibuprofen (MOTRIN) 600 MG Tab Take 1 Tab by mouth every 6 hours as needed (For cramping after delivery; do not give if patient is receiving ketorolac (Toradol)). 30 Tab 1   • ferrous sulfate 325 (65 FE) MG tablet Take 1 Tab by mouth 2 times a day, with meals. 30 Tab 3   • docusate sodium 100 MG Cap Take 100 mg by mouth 2 times a day as needed for Constipation. 60 Cap 1   • prenatal plus vitamin (STUARTNATAL 1+1) 27-1 MG Tab tablet Take 1 Tab by mouth every morning. 30 Tab 3   • bisacodyl (DULCOLAX) 10 MG Suppos Insert 1 Suppository in rectum every day. 5 Suppository 0       Rosanne Moscoso M.D.

## 2017-03-15 NOTE — CARE PLAN
Problem: Infection  Goal: Will remain free from infection  Patient's remains free from signs of infection. Vital signs are within normal parameters . Lochia is scant, Fundus is firm. Will continue to monitor.     Problem: Pain Management  Goal: Pain level will decrease to patient’s comfort goal  Patient states adequate pain control with pain medications.

## 2017-03-15 NOTE — DISCHARGE INSTRUCTIONS
POSTPARTUM DISCHARGE INSTRUCTIONS FOR MOM    YOB: 1995   Age: 21 y.o.               Admit Date: 3/13/2017     Discharge Date: 3/15/2017  Attending Doctor:  Olga Ortiz M.D.                  Allergies:  Review of patient's allergies indicates no known allergies.    Discharged to home by car. Discharged via wheelchair, hospital escort: Yes.  Special equipment needed: Not Applicable  Belongings with: Personal  Be sure to schedule a follow-up appointment with your primary care doctor or any specialists as instructed.     Discharge Plan:   Diet Plan: Discussed  Activity Level: Discussed  Smoking Cessation Offered: Patient Counseled  Confirmed Follow up Appointment: Patient to Call and Schedule Appointment  Confirmed Symptoms Management: Discussed  Medication Reconciliation Updated: Yes  Influenza Vaccine Indication: Indicated: 9 to 64 years of age    REASONS TO CALL YOUR OBSTETRICIAN:  1.   Persistent fever or shaking chills (Temperature higher than 100.4)  2.   Heavy bleeding (soaking more than 1 pad per hour); Passing clots  3.   Foul odor from vagina  4.   Mastitis (Breast infection; breast pain, chills, fever, redness)  5.   Urinary pain, burning or frequency  6.   Episiotomy infection  7.   Abdominal incision infection  8.   Severe depression longer than 24 hours    HAND WASHING  · Prior to handling the baby.  · Before breastfeeding or bottle feeding baby.  · After using the bathroom or changing the baby's diaper.      VAGINAL CARE  · Nothing inside vagina for 6 weeks: no sexual intercourse, tampons or douching.  · Bleeding may continue for 2-4 weeks.  Amount may vary.    · Call your physician for heavy bleeding which means soaking more than 1 pad per hour    BIRTH CONTROL  · It is possible to become pregnant at any time after delivery and while breastfeeding.  · Plan to discuss a method of birth control with your physician at your follow up visit. visit.    DIET AND ELIMINATION  · Eating more fiber  "(bran cereal, fruits, and vegetables) and drinking plenty of fluids will help to avoid constipation.  · Urinary frequency after childbirth is normal.    POSTPARTUM BLUES  During the first few days after birth, you may experience a sense of the \"blues\" which may include impatience, irritability or even crying.  These feeling come and go quickly.  However, as many as 1 in 10 women experience emotional symptoms known as postpartum depression.    Postpartum depression:  May start as early as the second or third day after delivery or take several weeks or months to develop.  Symptoms of \"blues\" are present, but are more intense:  Crying spells; loss of appetite; feelings of hopelessness or loss of control; fear of touching the baby; over concern or no concern at all about the baby; little or no concern about your own appearance/caring for yourself; and/or inability to sleep or excessive sleeping.  Contact your physician if you are experiencing any of these symptoms.    Crisis Hotline:  · [place] Crisis Hotline:  0-696-FBVNNRR  Or 1-336.247.1195  · [place] Crisis Hotline:  1-271.836.8077  Or 866-429-6692    PREVENTING SHAKEN BABY:  If you are angry or stressed, PUT THE BABY IN THE CRIB, step away, take some deep breaths, and wait until you are calm to care for the baby.  DO NOT SHAKE THE BABY.  You are not alone, call a supporter for help.    · Crisis Call Center 24/7 crisis line 665-706-0036 or 1-524.780.7485  · You can also text them, text \"ANSWER\" to 258393    QUIT SMOKING/TOBACCO USE:  I understand the use of any tobacco products increases my chance of suffering from future heart disease and could cause other illnesses which may shorten my life. Quitting the use of tobacco products is the single most important thing I can do to improve my health. For further information on smoking / tobacco cessation call a Toll Free Quit Line at 1-988.512.9603 (*[employer] [place]) or 1-249.963.4835 (American Lung Association) " or you can access the web based program at www.lungusa.org.    · [place] Tobacco Users Help Line:  (750) 699-7314       Toll Free: 1-907.725.2732  · Quit Tobacco Program [facility] (753)665-7476    DEPRESSION / SUICIDE RISK:  As you are discharged from this [facility], it is important to learn how to keep safe from harming yourself.    Recognize the warning signs:  · Abrupt changes in personality, positive or negative- including increase in energy   · Giving away possessions  · Change in eating patterns- significant weight changes-  positive or negative  · Change in sleeping patterns- unable to sleep or sleeping all the time   · Unwillingness or inability to communicate  · Depression  · Unusual sadness, discouragement and loneliness  · Talk of wanting to die  · Neglect of personal appearance   · Rebelliousness- reckless behavior  · Withdrawal from people/activities they love  · Confusion- inability to concentrate     If you or a loved one observes any of these behaviors or has concerns about self-harm, here's what you can do:  · Talk about it- your feelings and reasons for harming yourself  · Remove any means that you might use to hurt yourself (examples: pills, rope, extension cords, firearm)  · Get professional help from the community (Mental Health, Substance Abuse, psychological counseling)  · Do not be alone:Call your Safe Contact- someone whom you trust who will be there for you.  · Call your local CRISIS HOTLINE 116-5975 or 271-917-5400  · Call your local Children's Mobile Crisis Response Team [place] (676) 515-6496 or www.[place]  · Call the toll free National Suicide Prevention Hotlines   · National Suicide Prevention Lifeline 456-456-KMBW (8469)  · National Hope Line Network 800-SUICIDE (559-8409)    DISCHARGE SURVEY:  Thank you for choosing [facility].  We hope we provided you with very good care.  You may be receiving a survey in the mail.  Please fill  it out.  Your opinion is valuable to us.    ADDITIONAL EDUCATIONAL MATERIALS GIVEN TO PATIENT:        My signature on this form indicates that:  1.  I have reviewed and understand the above information  2.  My questions regarding this information have been answered to my satisfaction.  3.  I have formulated a plan with my discharge nurse to obtain my prescribed medication for home.

## 2017-03-15 NOTE — PROGRESS NOTES
Mother discharged before able to see but per floor RN Kassie mother has [employer] and planning to get WIC pump for home use.

## 2017-04-24 VITALS — SYSTOLIC BLOOD PRESSURE: 106 MMHG | DIASTOLIC BLOOD PRESSURE: 60 MMHG | WEIGHT: 180 LBS | BODY MASS INDEX: 31.38 KG/M2

## 2017-07-28 ENCOUNTER — GYNECOLOGY VISIT (OUTPATIENT)
Dept: OBGYN | Facility: CLINIC | Age: 22
End: 2017-07-28
Payer: MEDICAID

## 2017-07-28 VITALS — WEIGHT: 164 LBS | SYSTOLIC BLOOD PRESSURE: 114 MMHG | BODY MASS INDEX: 29.06 KG/M2 | DIASTOLIC BLOOD PRESSURE: 62 MMHG

## 2017-07-28 DIAGNOSIS — Z30.09 FAMILY PLANNING EDUCATION, GUIDANCE, AND COUNSELING: ICD-10-CM

## 2017-07-28 PROCEDURE — 99213 OFFICE O/P EST LOW 20 MIN: CPT | Performed by: OBSTETRICS & GYNECOLOGY

## 2017-07-28 NOTE — PROGRESS NOTES
Chief Complaint   Patient presents with   • Gynecologic Exam     discuss btl       History of present illness:   22 y.o.  presents for discussion of permanent sterilization  No gyn complaints at this time  She had a vaginal delivery 3/13/17  Resumed normal periods    Review of systems:  Pertinent positives documented in HPI and all other systems reviewed & are negative    All PMH, PSH, allergies, social history and FH reviewed and updated today:  Past Medical History   Diagnosis Date   • Anemia    • Nausea and vomiting in pregnancy      ,   • Chalazion of right upper eyelid 3/7/2017   • Pregnant        History reviewed. No pertinent past surgical history.    Allergies: No Known Allergies    Social History     Social History   • Marital Status: Single     Spouse Name: N/A   • Number of Children: N/A   • Years of Education: N/A     Occupational History   • Not on file.     Social History Main Topics   • Smoking status: Former Smoker -- 1 years     Quit date: 2016   • Smokeless tobacco: Not on file      Comment: Stopped smoking once she found out she was pregnant   • Alcohol Use: No   • Drug Use: No   • Sexual Activity:     Partners: Male     Birth Control/ Protection: Condom      Comment: Unplanne pregnancy     Other Topics Concern   • Not on file     Social History Narrative    ** Merged History Encounter **            History reviewed. No pertinent family history.    Physical exam:  Blood pressure 114/62, weight 74.39 kg (164 lb), last menstrual period 2017, unknown if currently breastfeeding.    General:appears stated age, is in no apparent distress, is well developed and well nourished  Skin: No rashes, or ulcers or lesions seen  Psychiatric: Patient shows appropriate affect, is alert and oriented x3, intact judgment and insight.  1. Family planning education, guidance, and counseling  REFERRAL TO GYNECOLOGY   2. Options for BC discussed with her including permanent sterilization  methods and LARC method, IUD, nexplanon.  R/I/B of each explained to her  3. Pt deciding on the paragard. Advantages over other IUD, being non-hormone. Complications of uterine perforation. Failure < 1% and pregnancy, maybe intrauterine or ectopic  4. All questions addressed  5. Gyn ff-up

## 2017-08-28 ENCOUNTER — TELEPHONE (OUTPATIENT)
Dept: OBGYN | Facility: CLINIC | Age: 22
End: 2017-08-28

## 2017-09-25 ENCOUNTER — TELEPHONE (OUTPATIENT)
Dept: OBGYN | Facility: CLINIC | Age: 22
End: 2017-09-25

## 2017-09-25 NOTE — TELEPHONE ENCOUNTER
8/29/17 per [employer] fax they haven't being able to speak with pt regarding [employer] order, they will place order in hold until they contact pt.    I called pt but unable to contact msg left to call back.    9/25/17 I called pt but unable to contact, msg left to call back

## 2019-04-05 ENCOUNTER — NON-PROVIDER VISIT (OUTPATIENT)
Dept: OBGYN | Facility: CLINIC | Age: 24
End: 2019-04-05
Payer: MEDICAID

## 2019-04-05 DIAGNOSIS — Z32.02 PREGNANCY EXAMINATION OR TEST, NEGATIVE RESULT: ICD-10-CM

## 2019-04-05 LAB
INT CON NEG: NEGATIVE
INT CON POS: POSITIVE
POC URINE PREGNANCY TEST: NEGATIVE

## 2019-04-05 PROCEDURE — 81025 URINE PREGNANCY TEST: CPT | Performed by: OBSTETRICS & GYNECOLOGY

## 2019-05-27 ENCOUNTER — HOSPITAL ENCOUNTER (EMERGENCY)
Facility: MEDICAL CENTER | Age: 24
End: 2019-05-27
Attending: EMERGENCY MEDICINE
Payer: MEDICAID

## 2019-05-27 VITALS
RESPIRATION RATE: 18 BRPM | HEIGHT: 65 IN | TEMPERATURE: 98.1 F | DIASTOLIC BLOOD PRESSURE: 88 MMHG | SYSTOLIC BLOOD PRESSURE: 136 MMHG | BODY MASS INDEX: 28.43 KG/M2 | WEIGHT: 170.64 LBS | HEART RATE: 79 BPM | OXYGEN SATURATION: 97 %

## 2019-05-27 DIAGNOSIS — N12 PYELONEPHRITIS: ICD-10-CM

## 2019-05-27 LAB
ALBUMIN SERPL BCP-MCNC: 4.3 G/DL (ref 3.2–4.9)
ALBUMIN/GLOB SERPL: 1.1 G/DL
ALP SERPL-CCNC: 72 U/L (ref 30–99)
ALT SERPL-CCNC: 24 U/L (ref 2–50)
ANION GAP SERPL CALC-SCNC: 10 MMOL/L (ref 0–11.9)
APPEARANCE UR: ABNORMAL
AST SERPL-CCNC: 25 U/L (ref 12–45)
BACTERIA #/AREA URNS HPF: ABNORMAL /HPF
BASOPHILS # BLD AUTO: 0.4 % (ref 0–1.8)
BASOPHILS # BLD: 0.05 K/UL (ref 0–0.12)
BILIRUB SERPL-MCNC: 0.7 MG/DL (ref 0.1–1.5)
BILIRUB UR QL STRIP.AUTO: NEGATIVE
BUN SERPL-MCNC: 9 MG/DL (ref 8–22)
CALCIUM SERPL-MCNC: 9 MG/DL (ref 8.4–10.2)
CHLORIDE SERPL-SCNC: 103 MMOL/L (ref 96–112)
CO2 SERPL-SCNC: 22 MMOL/L (ref 20–33)
COLOR UR: YELLOW
CREAT SERPL-MCNC: 0.72 MG/DL (ref 0.5–1.4)
EOSINOPHIL # BLD AUTO: 0.11 K/UL (ref 0–0.51)
EOSINOPHIL NFR BLD: 0.9 % (ref 0–6.9)
EPI CELLS #/AREA URNS HPF: ABNORMAL /HPF
ERYTHROCYTE [DISTWIDTH] IN BLOOD BY AUTOMATED COUNT: 38.3 FL (ref 35.9–50)
GLOBULIN SER CALC-MCNC: 3.9 G/DL (ref 1.9–3.5)
GLUCOSE SERPL-MCNC: 87 MG/DL (ref 65–99)
GLUCOSE UR STRIP.AUTO-MCNC: NEGATIVE MG/DL
HCG SERPL QL: NEGATIVE
HCT VFR BLD AUTO: 46.2 % (ref 37–47)
HGB BLD-MCNC: 15.4 G/DL (ref 12–16)
IMM GRANULOCYTES # BLD AUTO: 0.03 K/UL (ref 0–0.11)
IMM GRANULOCYTES NFR BLD AUTO: 0.2 % (ref 0–0.9)
KETONES UR STRIP.AUTO-MCNC: NEGATIVE MG/DL
LEUKOCYTE ESTERASE UR QL STRIP.AUTO: NEGATIVE
LIPASE SERPL-CCNC: 32 U/L (ref 7–58)
LYMPHOCYTES # BLD AUTO: 3.34 K/UL (ref 1–4.8)
LYMPHOCYTES NFR BLD: 26.2 % (ref 22–41)
MCH RBC QN AUTO: 27.6 PG (ref 27–33)
MCHC RBC AUTO-ENTMCNC: 33.3 G/DL (ref 33.6–35)
MCV RBC AUTO: 82.8 FL (ref 81.4–97.8)
MICRO URNS: ABNORMAL
MONOCYTES # BLD AUTO: 0.82 K/UL (ref 0–0.85)
MONOCYTES NFR BLD AUTO: 6.4 % (ref 0–13.4)
MUCOUS THREADS #/AREA URNS HPF: ABNORMAL /HPF
NEUTROPHILS # BLD AUTO: 8.4 K/UL (ref 2–7.15)
NEUTROPHILS NFR BLD: 65.9 % (ref 44–72)
NITRITE UR QL STRIP.AUTO: POSITIVE
NRBC # BLD AUTO: 0 K/UL
NRBC BLD-RTO: 0 /100 WBC
PH UR STRIP.AUTO: 5.5 [PH]
PLATELET # BLD AUTO: 265 K/UL (ref 164–446)
PMV BLD AUTO: 10.4 FL (ref 9–12.9)
POTASSIUM SERPL-SCNC: 3.4 MMOL/L (ref 3.6–5.5)
PROT SERPL-MCNC: 8.2 G/DL (ref 6–8.2)
PROT UR QL STRIP: NEGATIVE MG/DL
RBC # BLD AUTO: 5.58 M/UL (ref 4.2–5.4)
RBC # URNS HPF: ABNORMAL /HPF
RBC UR QL AUTO: NEGATIVE
SODIUM SERPL-SCNC: 135 MMOL/L (ref 135–145)
SP GR UR REFRACTOMETRY: 1.03
UNIDENT CRYS URNS QL MICRO: ABNORMAL /HPF
WBC # BLD AUTO: 12.8 K/UL (ref 4.8–10.8)
WBC #/AREA URNS HPF: ABNORMAL /HPF

## 2019-05-27 PROCEDURE — 85025 COMPLETE CBC W/AUTO DIFF WBC: CPT

## 2019-05-27 PROCEDURE — 81001 URINALYSIS AUTO W/SCOPE: CPT

## 2019-05-27 PROCEDURE — A9270 NON-COVERED ITEM OR SERVICE: HCPCS | Performed by: EMERGENCY MEDICINE

## 2019-05-27 PROCEDURE — 80053 COMPREHEN METABOLIC PANEL: CPT

## 2019-05-27 PROCEDURE — 84703 CHORIONIC GONADOTROPIN ASSAY: CPT

## 2019-05-27 PROCEDURE — 99284 EMERGENCY DEPT VISIT MOD MDM: CPT

## 2019-05-27 PROCEDURE — 83690 ASSAY OF LIPASE: CPT

## 2019-05-27 PROCEDURE — 700102 HCHG RX REV CODE 250 W/ 637 OVERRIDE(OP): Performed by: EMERGENCY MEDICINE

## 2019-05-27 RX ORDER — CEFDINIR 300 MG/1
300 CAPSULE ORAL ONCE
Status: COMPLETED | OUTPATIENT
Start: 2019-05-27 | End: 2019-05-27

## 2019-05-27 RX ORDER — CEFDINIR 300 MG/1
300 CAPSULE ORAL 2 TIMES DAILY
Qty: 20 CAP | Refills: 0 | Status: SHIPPED | OUTPATIENT
Start: 2019-05-27 | End: 2019-07-16

## 2019-05-27 RX ADMIN — CEFDINIR 300 MG: 300 CAPSULE ORAL at 15:02

## 2019-05-27 NOTE — ED PROVIDER NOTES
ED Provider Note    CHIEF COMPLAINT  Chief Complaint   Patient presents with   • Abdominal Pain     low abdominal pain, low back pain, and vaginal spotting.  Last period was May 7       HPI  Ronit Tripathi is a 24 y.o. female who presents for evaluation of crampy lower abdominal pain left flank pain mild burning with urination.  Patient denies any right lower quadrant pain denies pregnancy no vaginal bleeding or discharge.  She has no abdominal surgical history she is otherwise quite healthy symptoms have been present for 2 to 3 days.  No high fevers or chills night sweats or weight loss.  Nothing seems to make it better or worse, she reports dull constant primarily left flank discomfort no high fevers or chills vomiting or diarrhea    REVIEW OF SYSTEMS  See HPI for further details.  No night sweats weight loss numbness tingling weakness rash all other systems are negative.     PAST MEDICAL HISTORY  Past Medical History:   Diagnosis Date   • Chalazion of right upper eyelid 3/7/2017   • Anemia    • Nausea and vomiting in pregnancy     2010,2014   • Pregnant        FAMILY HISTORY  No history of bleeding disorder    SOCIAL HISTORY  Social History     Social History   • Marital status: Single     Spouse name: N/A   • Number of children: N/A   • Years of education: N/A     Social History Main Topics   • Smoking status: Former Smoker     Years: 1.00     Quit date: 6/13/2016   • Smokeless tobacco: Not on file      Comment: Stopped smoking once she found out she was pregnant   • Alcohol use No   • Drug use: No   • Sexual activity: Yes     Partners: Male     Birth control/ protection: Condom      Comment: Unplanne pregnancy     Other Topics Concern   • Not on file     Social History Narrative    ** Merged History Encounter **          Mother of 4 children smoke cigarettes no alcohol or drug abuse  SURGICAL HISTORY  No past surgical history on file.  No surgeries  CURRENT MEDICATIONS  Home Medications    **Home  "medications have not yet been reviewed for this encounter**         ALLERGIES  No Known Allergies    PHYSICAL EXAM  VITAL SIGNS: /92   Pulse 79   Temp 36.4 °C (97.6 °F) (Temporal)   Resp 16   Ht 1.651 m (5' 5\")   Wt 77.4 kg (170 lb 10.2 oz)   LMP 05/07/2019 (Approximate)   SpO2 100%   Breastfeeding? No   BMI 28.40 kg/m²  Room air O2: 100    Constitutional: Well developed, Well nourished, No acute distress, Non-toxic appearance.   HENT: Normocephalic, Atraumatic, Bilateral external ears normal, Oropharynx moist, No oral exudates, Nose normal.   Eyes: PERRLA, EOMI, Conjunctiva normal, No discharge.   Neck: Normal range of motion, No tenderness, Supple, No stridor.   Cardiovascular: Normal heart rate, Normal rhythm, No murmurs, No rubs, No gallops.   Thorax & Lungs: Normal breath sounds, No respiratory distress, No wheezing, No chest tenderness.   Abdomen: Bowel sounds normal, Soft, No tenderness, No masses, No pulsatile masses.   Skin: Warm, Dry, No erythema, No rash.   Back: Left-sided, No CVA tenderness.   Extremities: Intact distal pulses, No edema, No tenderness, No cyanosis, No clubbing.   Neurologic: Alert & oriented x 3, Normal motor function, Normal sensory function, No focal deficits noted.   Psychiatric: Anxious      COURSE & MEDICAL DECISION MAKING  Pertinent Labs & Imaging studies reviewed. (See chart for details)  Results for orders placed or performed during the hospital encounter of 05/27/19   CBC WITH DIFFERENTIAL   Result Value Ref Range    WBC 12.8 (H) 4.8 - 10.8 K/uL    RBC 5.58 (H) 4.20 - 5.40 M/uL    Hemoglobin 15.4 12.0 - 16.0 g/dL    Hematocrit 46.2 37.0 - 47.0 %    MCV 82.8 81.4 - 97.8 fL    MCH 27.6 27.0 - 33.0 pg    MCHC 33.3 (L) 33.6 - 35.0 g/dL    RDW 38.3 35.9 - 50.0 fL    Platelet Count 265 164 - 446 K/uL    MPV 10.4 9.0 - 12.9 fL    Neutrophils-Polys 65.90 44.00 - 72.00 %    Lymphocytes 26.20 22.00 - 41.00 %    Monocytes 6.40 0.00 - 13.40 %    Eosinophils 0.90 0.00 - 6.90 " %    Basophils 0.40 0.00 - 1.80 %    Immature Granulocytes 0.20 0.00 - 0.90 %    Nucleated RBC 0.00 /100 WBC    Neutrophils (Absolute) 8.40 (H) 2.00 - 7.15 K/uL    Lymphs (Absolute) 3.34 1.00 - 4.80 K/uL    Monos (Absolute) 0.82 0.00 - 0.85 K/uL    Eos (Absolute) 0.11 0.00 - 0.51 K/uL    Baso (Absolute) 0.05 0.00 - 0.12 K/uL    Immature Granulocytes (abs) 0.03 0.00 - 0.11 K/uL    NRBC (Absolute) 0.00 K/uL   COMP METABOLIC PANEL   Result Value Ref Range    Sodium 135 135 - 145 mmol/L    Potassium 3.4 (L) 3.6 - 5.5 mmol/L    Chloride 103 96 - 112 mmol/L    Co2 22 20 - 33 mmol/L    Anion Gap 10.0 0.0 - 11.9    Glucose 87 65 - 99 mg/dL    Bun 9 8 - 22 mg/dL    Creatinine 0.72 0.50 - 1.40 mg/dL    Calcium 9.0 8.4 - 10.2 mg/dL    AST(SGOT) 25 12 - 45 U/L    ALT(SGPT) 24 2 - 50 U/L    Alkaline Phosphatase 72 30 - 99 U/L    Total Bilirubin 0.7 0.1 - 1.5 mg/dL    Albumin 4.3 3.2 - 4.9 g/dL    Total Protein 8.2 6.0 - 8.2 g/dL    Globulin 3.9 (H) 1.9 - 3.5 g/dL    A-G Ratio 1.1 g/dL   LIPASE   Result Value Ref Range    Lipase 32 7 - 58 U/L   URINALYSIS,CULTURE IF INDICATED   Result Value Ref Range    Color Yellow     Character Hazy (A)     Ph 5.5 5.0 - 8.0    Glucose Negative Negative mg/dL    Ketones Negative Negative mg/dL    Protein Negative Negative mg/dL    Bilirubin Negative Negative    Nitrite Positive (A) Negative    Leukocyte Esterase Negative Negative    Occult Blood Negative Negative    Micro Urine Req Microscopic    HCG QUAL SERUM   Result Value Ref Range    Beta-Hcg Qualitative Serum Negative Negative   REFRACTOMETER SG   Result Value Ref Range    Specific Gravity 1.027    URINE MICROSCOPIC (W/UA)   Result Value Ref Range    WBC 5-10 (A) /hpf    RBC 0-2 /hpf    Bacteria Moderate (A) None /hpf    Epithelial Cells Rare Few /hpf    Mucous Threads Moderate /hpf    Urine Crystals Rare Amorphous /hpf   ESTIMATED GFR   Result Value Ref Range    GFR If African American >60 >60 mL/min/1.73 m 2    GFR If Non African  American >60 >60 mL/min/1.73 m 2      Patient presents here with left flank pain mild leukocytosis without bandemia urinalysis suggesting likely mild and early pyelonephritis.  I performed serial abdominal exams and she has no evidence of right lower quadrant tenderness or peritonitis.  She is not pregnant.  Her vital signs do not suggest sepsis.  I did not feel that imaging is indicated.  She has no significant hematuria or sharp waves of pain to suggest kidney stone.  I will treat her with Omnicef first dose administered here and give her a 10-day course and recommend she return if she develops any new or worsening symptoms  FINAL IMPRESSION  1.  Uncomplicated left-sided pyelonephritis         Electronically signed by: Poncho Stover, 5/27/2019 2:35 PM

## 2019-05-27 NOTE — ED NOTES
"Pt found sitting on side of laurent crying.  When asked, pt stated \"I'm just tired.  I'm stressed.\"  RN sat next to pt, pt started talking about raising four children without support of fathers or family support.  When asked pt stated \"I do it all on my own.\"  Pt continued to talk.  Pt was asked if feeling SI or HI, pt denied twice.  Offered pt resources including talking to  for community resources.  Pt declined.  Pt was given time to talk to RN and discuss possible resources.  Pt thanked RN again denied SI.  Reviewed discharge instructions and printed prescription x 1, verbalized understanding to information provided including follow up care, denied further questions/concerns.  Pt ambulated from ED.    "

## 2019-07-16 ENCOUNTER — APPOINTMENT (OUTPATIENT)
Dept: RADIOLOGY | Facility: MEDICAL CENTER | Age: 24
End: 2019-07-16
Attending: EMERGENCY MEDICINE

## 2019-07-16 ENCOUNTER — HOSPITAL ENCOUNTER (EMERGENCY)
Facility: MEDICAL CENTER | Age: 24
End: 2019-07-16
Attending: EMERGENCY MEDICINE

## 2019-07-16 VITALS
HEART RATE: 93 BPM | BODY MASS INDEX: 26.86 KG/M2 | WEIGHT: 167.11 LBS | TEMPERATURE: 98.5 F | HEIGHT: 66 IN | SYSTOLIC BLOOD PRESSURE: 122 MMHG | OXYGEN SATURATION: 97 % | DIASTOLIC BLOOD PRESSURE: 81 MMHG | RESPIRATION RATE: 14 BRPM

## 2019-07-16 DIAGNOSIS — J06.9 UPPER RESPIRATORY TRACT INFECTION, UNSPECIFIED TYPE: ICD-10-CM

## 2019-07-16 LAB — S PYO DNA SPEC NAA+PROBE: NOT DETECTED

## 2019-07-16 PROCEDURE — 87651 STREP A DNA AMP PROBE: CPT

## 2019-07-16 PROCEDURE — 99283 EMERGENCY DEPT VISIT LOW MDM: CPT

## 2019-07-16 PROCEDURE — 71046 X-RAY EXAM CHEST 2 VIEWS: CPT

## 2019-07-16 PROCEDURE — A9270 NON-COVERED ITEM OR SERVICE: HCPCS | Performed by: EMERGENCY MEDICINE

## 2019-07-16 PROCEDURE — 700102 HCHG RX REV CODE 250 W/ 637 OVERRIDE(OP): Performed by: EMERGENCY MEDICINE

## 2019-07-16 RX ORDER — IBUPROFEN 600 MG/1
600 TABLET ORAL ONCE
Status: COMPLETED | OUTPATIENT
Start: 2019-07-16 | End: 2019-07-16

## 2019-07-16 RX ADMIN — IBUPROFEN 600 MG: 600 TABLET ORAL at 14:36

## 2019-07-16 ASSESSMENT — ENCOUNTER SYMPTOMS
SPUTUM PRODUCTION: 1
SORE THROAT: 1
VOMITING: 0
HEADACHES: 1
CHILLS: 0
COUGH: 1

## 2019-07-16 NOTE — ED TRIAGE NOTES
"Chief Complaint   Patient presents with   • Sore Throat     since yesterday   • Congestion   • Cough     Pt to triage for above. Nasal and chest congestion, +cough.     Pt returned to lobby. Educated on triage process and to inform staff of any changes.     /81   Pulse 93   Temp 36.9 °C (98.5 °F) (Temporal)   Resp 14   Ht 1.676 m (5' 6\")   Wt 75.8 kg (167 lb 1.7 oz)   SpO2 97%   BMI 26.97 kg/m²     "

## 2019-07-16 NOTE — ED PROVIDER NOTES
ED Provider Note    Scribed for Poncho Resendiz M.D. by Tyrone Avitia. 7/16/2019, 2:23 PM.    Primary care provider: Pcp Pt States None  Means of arrival: walk-in  History obtained from: patient  History limited by: none    CHIEF COMPLAINT  Chief Complaint   Patient presents with   • Sore Throat     since yesterday   • Congestion   • Cough       HPI  Ronit Tripathi is a 24 y.o. female who presents to the Emergency Department complaining of a sore throat onset yesterday. Per the nurse's note, the patient began experiencing a sore throat yesterday with associated rhinorrhea, headaches, nasal and chest congestion, and a cough with associated sputum production. She denies any associated emesis, however. The patient also reports treating her symptoms with Tylenol, but reports that it did not alleviate her sore throat or cough. The patient's  also notes that he recently had similar symptoms and believes that she may currently present with a similar illness. She has a history of anemia, but no other long term medical diseases such as hypertension or diabetes were noted. She also denies any allergies or regular medications.    REVIEW OF SYSTEMS  Review of Systems   Constitutional: Negative for chills.   HENT: Positive for congestion (Nasal and chest) and sore throat.         Positive for rhinorrhea   Respiratory: Positive for cough and sputum production.    Gastrointestinal: Negative for vomiting.   Neurological: Positive for headaches.       PAST MEDICAL HISTORY   has a past medical history of Anemia; Chalazion of right upper eyelid (3/7/2017); Nausea and vomiting in pregnancy; and Pregnant.    SURGICAL HISTORY  patient denies any surgical history    SOCIAL HISTORY  Social History   Substance Use Topics   • Smoking status: Current Every Day Smoker     Years: 1.00   • Smokeless tobacco: Never Used      Comment: Stopped smoking once she found out she was pregnant   • Alcohol use No      History   Drug Use No  "      FAMILY HISTORY  History reviewed. No pertinent family history.    CURRENT MEDICATIONS  Home Medications     Reviewed by Geraldo Crooks R.N. (Registered Nurse) on 07/16/19 at 1412  Med List Status: Partial   Medication Last Dose Status   ibuprofen (MOTRIN) 600 MG Tab  Active                ALLERGIES  No Known Allergies    PHYSICAL EXAM  VITAL SIGNS: /81   Pulse 93   Temp 36.9 °C (98.5 °F) (Temporal)   Resp 14   Ht 1.676 m (5' 6\")   Wt 75.8 kg (167 lb 1.7 oz)   SpO2 97%   BMI 26.97 kg/m²   Vitals reviewed.  Constitutional: Well developed, Well nourished, No acute distress, Non-toxic appearance.   HENT: Throat red with enlarged tonsils. Clear mucous membranes.  Normocephalic, Atraumatic, Bilateral external ears normal, Bilateral TMs normal. No oral exudates, Nose normal.   Eyes: PERRL, EOMI, Conjunctiva normal, No discharge.   Neck: Normal range of motion, No tenderness, Supple, No stridor.   Cardiovascular: Normal heart rate, Normal rhythm, No murmurs, No rubs, No gallops.   Thorax & Lungs: Normal breath sounds, No respiratory distress, No wheezing,   Abdomen: Bowel sounds normal, Soft, No tenderness  Skin: Warm, Dry, No erythema, No rash.   Back: No tenderness, No CVA tenderness.   Musculoskeletal: Good range of motion in all major joints.   Neurologic: Alert, No focal deficits noted.   Psychiatric: Affect normal    LABS  Results for orders placed or performed during the hospital encounter of 07/16/19   Group A Strep by PCR   Result Value Ref Range    Group A Strep by PCR Not Detected Not Detected     All labs reviewed by me.    RADIOLOGY  DX-CHEST-2 VIEWS   Final Result      No acute cardiopulmonary process is identified.        The radiologist's interpretation of all radiological studies have been reviewed by me.    COURSE & MEDICAL DECISION MAKING  Pertinent Labs & Imaging studies reviewed. (See chart for details)    2:23 PM Patient seen and examined at bedside. The patient presents with a sore " throat. Ordered for DX-Chest and Rapid Strep to evaluate. Patient will be treated with Motrin 600 mg for her symptoms.      3:53 PM - Patient was reevaluated at bedside. Discussed lab and radiology results with the patient and informed them that their strep test was negative and the patient does not have pneumonia. At this time, I am comfortable safely discharging the patient home with instruction to follow up with the [facility] if symptoms worsen. The patient is understanding and agreeable to the plan of care.      Patient has a sore throat runny nose nasal congestion and cough.  There is no signs of note lobar pneumonia.  Her lungs are clear examination x-ray is negative.  She does not have strep.  TMs do not show evidence of otitis media and she is no signs of meningitis.  She is not ill or toxic appearing.  This appears to be a viral illness will treat as such with supportive care rest plenty fluids and return precautions.  She is agreeable to plan.  Questions are answered and she is discharged in good condition.    The patient will return for new or worsening symptoms and is stable at the time of discharge.    DISPOSITION:  Patient will be discharged home in stable condition.    FOLLOW UP:  [address] [address] [phone]  Schedule an appointment as soon as possible for a visit in 2 days      OUTPATIENT MEDICATIONS:  New Prescriptions    No medications on file       FINAL IMPRESSION  1. Upper respiratory tract infection, unspecified type        I, Tyrone Avitia ([place]), am scribing for, and in the presence of, Poncho Resendiz M.D..    Electronically signed by: Tyrone Avitia (Giuliana), 7/16/2019    IPoncho M.D. personally performed the services described in this documentation, as scribed by Tyrone Avitia in my presence, and it is both accurate and complete.    E    The note accurately reflects work and decisions  made by me.  Poncho Resendiz  7/16/2019  4:35 PM        .

## 2019-07-16 NOTE — DISCHARGE INSTRUCTIONS
Rest, drink plenty of fluids.  Tylenol or ibuprofen for pain.  Return for worsening sore throat cough fever or other concerns.  You can try over-the-counter cold remedies.

## 2019-09-09 ENCOUNTER — INITIAL PRENATAL (OUTPATIENT)
Dept: OBGYN | Facility: CLINIC | Age: 24
End: 2019-09-09

## 2019-09-09 DIAGNOSIS — N93.8 DUB (DYSFUNCTIONAL UTERINE BLEEDING): ICD-10-CM

## 2019-09-09 LAB
INT CON NEG: NEGATIVE
INT CON POS: POSITIVE
POC URINE PREGNANCY TEST: POSITIVE

## 2019-09-09 PROCEDURE — 81025 URINE PREGNANCY TEST: CPT | Performed by: OBSTETRICS & GYNECOLOGY

## 2019-09-09 PROCEDURE — 76830 TRANSVAGINAL US NON-OB: CPT | Performed by: OBSTETRICS & GYNECOLOGY

## 2019-09-09 PROCEDURE — 99213 OFFICE O/P EST LOW 20 MIN: CPT | Mod: 25 | Performed by: OBSTETRICS & GYNECOLOGY

## 2019-09-09 NOTE — PROGRESS NOTES
"Cc: Confirmation of pregnancy    HPI:  The patient is a 24 y.o.  here for positive pregnancy.  Patient believes her last menstrual period was August 3, 2019. This would make her 5 weeks 2 days with an BLAYNE of 2020.   Patient states that the end of August she had 2 days of vaginal bleeding with normal flow however with the abrupt cessation of flow at that time.  She started feeling \"off \"the next week and took a positive pregnancy test at home.  This pregnancy is both desired and planned.  Of note this is a different FOB than her previous 2017 delivery.      Review of systems:  Pertinent positives documented in HPI and all other systems reviewed & are negative    Gyn History: LMP 8/3/2019.  Regular cycles every month approximately 4 to 5 days of bleeding.  Denies any history of STDs, uterine surgeries, abnormal Pap smears.    Pregnancy related complications:  Patient had a blood transfusion in 2017 postpartum due to hemorrhage.    OB History    Para Term  AB Living   12 4 4 0 8 4   SAB TAB Ectopic Molar Multiple Live Births   0 5 0     4      # Outcome Date GA Lbr Ke/2nd Weight Sex Delivery Anes PTL Lv   12 TAB            11 Term 17 38w1d  2.955 kg (6 lb 8.2 oz) F Vag-Spont  N PASCUAL   10 Term 04/07/15 40w0d  3.062 kg (6 lb 12 oz) M Vag-Spont None Y PASCUAL      Birth Comments: Pt states no complications   9 TAB 2014     TAB         Birth Comments: NO COMPLICATIONS   8 AB  6w0d             Birth Comments: Pt states no complications   7 TAB 2012     TAB         Birth Comments: NO COMPLICATIONS   6 Term 12 39w5d  2.92 kg (6 lb 7 oz) F Vag-Spont EPI N PASCUAL      Birth Comments: Pt states no complications   5 AB  8w0d             Birth Comments: Pt. states wasnt ready for a baby.   4 Term 12/28/10 38w3d  2.693 kg (5 lb 15 oz) F Vag-Spont EPI Y PASCUAL      Birth Comments: Pt. states no complications.   3 AB  8w0d             Birth Comments: Pt. states couldnt afford the baby " so decided to have    2 TAB  6w0d             Birth Comments: no compl, some regret, feels mother forced her.     1 TAB  6w0d             Birth Comments: no compl, still feels some regret, feels forced by mother.     Past Medical History:   Diagnosis Date   • Anemia    • Blood transfusion without reported diagnosis        • Chalazion of right upper eyelid 3/7/2017   • Nausea and vomiting in pregnancy     ,   • Pregnant      No past surgical history on file.  Social History     Socioeconomic History   • Marital status: Single     Spouse name: Not on file   • Number of children: Not on file   • Years of education: Not on file   • Highest education level: Not on file   Occupational History   • Not on file   Social Needs   • Financial resource strain: Not on file   • Food insecurity:     Worry: Not on file     Inability: Not on file   • Transportation needs:     Medical: Not on file     Non-medical: Not on file   Tobacco Use   • Smoking status: Current Every Day Smoker     Years: .00   • Smokeless tobacco: Never Used   • Tobacco comment: Stopped smoking once she found out she was pregnant   Substance and Sexual Activity   • Alcohol use: No   • Drug use: No   • Sexual activity: Yes     Partners: Male     Birth control/protection: Condom     Comment: Unplanne pregnancy   Lifestyle   • Physical activity:     Days per week: Not on file     Minutes per session: Not on file   • Stress: Not on file   Relationships   • Social connections:     Talks on phone: Not on file     Gets together: Not on file     Attends Taoist service: Not on file     Active member of club or organization: Not on file     Attends meetings of clubs or organizations: Not on file     Relationship status: Not on file   • Intimate partner violence:     Fear of current or ex partner: Not on file     Emotionally abused: Not on file     Physically abused: Not on file     Forced sexual activity: Not on file   Other Topics Concern    • Not on file   Social History Narrative    ** Merged History Encounter **          No family history on file.  Allergies:   Allergies as of 09/09/2019   • (No Known Allergies)       PE:    There were no vitals taken for this visit.      General:appears stated age, is in no apparent distress  Head: normocephalic, non-tender  Neck: neck is supple, no jugular venous distension  Abdomen: Bowel sounds positive, nondistended, soft, nontender x4, no rebound or guarding. No organomegaly. No masses.  Female GYN: normal female external genitalia without lesions   exam deferred  Skin: No rashes, or ulcers or lesions seen  Psychiatric: Patient shows appropriate affect, is alert and oriented x3, intact judgment and insight.    transvaginal scan and per my read:    Indication: missed menses.     Findings: Thickened and inhomogenous uterine endometrium without obvious pregnancy.  Right ovary normal. Left Ovary few small follicles. Cervical length grossly normal. No free fluid in the cul-de-sac.    Impression: IUP too early to detect    A/P:   1. DUB (dysfunctional uterine bleeding)  POCT Pregnancy     Discussed with patient IUP too early to detect.  Advised starting prenatal vitamins  Patient to return in 3 weeks for repeat scan.  Advised avoidance of alcohol tobacco and drugs

## 2019-09-09 NOTE — NON-PROVIDER
today  LMP 08/03/19   PNV none   Phone # [phone]   Pharmacy verified with patient  C/oPt states no concerns today

## 2019-11-12 ENCOUNTER — HOSPITAL ENCOUNTER (EMERGENCY)
Facility: MEDICAL CENTER | Age: 24
End: 2019-11-12
Attending: EMERGENCY MEDICINE
Payer: MEDICAID

## 2019-11-12 VITALS
DIASTOLIC BLOOD PRESSURE: 83 MMHG | WEIGHT: 167.33 LBS | HEIGHT: 66 IN | TEMPERATURE: 98.3 F | RESPIRATION RATE: 16 BRPM | OXYGEN SATURATION: 98 % | HEART RATE: 81 BPM | BODY MASS INDEX: 26.89 KG/M2 | SYSTOLIC BLOOD PRESSURE: 120 MMHG

## 2019-11-12 DIAGNOSIS — J02.9 VIRAL PHARYNGITIS: ICD-10-CM

## 2019-11-12 LAB — S PYO DNA SPEC NAA+PROBE: NOT DETECTED

## 2019-11-12 PROCEDURE — A9270 NON-COVERED ITEM OR SERVICE: HCPCS | Performed by: EMERGENCY MEDICINE

## 2019-11-12 PROCEDURE — 700102 HCHG RX REV CODE 250 W/ 637 OVERRIDE(OP): Performed by: EMERGENCY MEDICINE

## 2019-11-12 PROCEDURE — 87651 STREP A DNA AMP PROBE: CPT

## 2019-11-12 PROCEDURE — 99284 EMERGENCY DEPT VISIT MOD MDM: CPT

## 2019-11-12 RX ORDER — ACETAMINOPHEN 325 MG/1
650 TABLET ORAL ONCE
Status: COMPLETED | OUTPATIENT
Start: 2019-11-12 | End: 2019-11-12

## 2019-11-12 RX ORDER — IBUPROFEN 200 MG
400 TABLET ORAL ONCE
Status: DISCONTINUED | OUTPATIENT
Start: 2019-11-12 | End: 2019-11-12

## 2019-11-12 RX ORDER — NAPROXEN 500 MG/1
500 TABLET ORAL 2 TIMES DAILY WITH MEALS
Qty: 14 TAB | Refills: 0 | Status: SHIPPED | OUTPATIENT
Start: 2019-11-12 | End: 2019-11-19

## 2019-11-12 RX ADMIN — ACETAMINOPHEN 650 MG: 325 TABLET, FILM COATED ORAL at 11:30

## 2019-11-12 RX ADMIN — LIDOCAINE HYDROCHLORIDE 15 ML: 20 SOLUTION OROPHARYNGEAL at 11:30

## 2019-11-12 NOTE — ED PROVIDER NOTES
"ED Provider Note    CHIEF COMPLAINT  Chief Complaint   Patient presents with   • Sore Throat       HPI  Ronit Tripathi is a 24 y.o. female who presents with source throat since Friday.  Notes left side of her throat is more painful.  Developing left lateral neck tenderness as well and ear pain.  No change in her hearing or ear discharge.    She states that she has a prior history of strep throat in the past.  Last episode was about a year ago.  No recent antibiotic use.  No antibiotic allergies.    Notes that she has painful swallowing though is able to speak and swallow normally.    REVIEW OF SYSTEMS  See HPI for further details. All other systems are negative.     PAST MEDICAL HISTORY   has a past medical history of Anemia, Blood transfusion without reported diagnosis, Chalazion of right upper eyelid (3/7/2017), Nausea and vomiting in pregnancy, and Pregnant.    SOCIAL HISTORY  Social History     Tobacco Use   • Smoking status: Current Every Day Smoker     Years: 1.00   • Smokeless tobacco: Never Used   • Tobacco comment: Stopped smoking once she found out she was pregnant   Substance and Sexual Activity   • Alcohol use: No   • Drug use: No   • Sexual activity: Yes     Partners: Male     Birth control/protection: Condom     Comment: Unplanne pregnancy       SURGICAL HISTORY  patient denies any surgical history    CURRENT MEDICATIONS  Home Medications     Reviewed by Donna David R.N. (Registered Nurse) on 11/12/19 at 1012  Med List Status: Complete   Medication Last Dose Status        Patient Ant Taking any Medications                       ALLERGIES  No Known Allergies    PHYSICAL EXAM  VITAL SIGNS: /93   Pulse 84   Temp 36.8 °C (98.3 °F) (Oral)   Resp 18   Ht 1.676 m (5' 6\")   Wt 75.9 kg (167 lb 5.3 oz)   SpO2 96%   BMI 27.01 kg/m²   Pulse ox interpretation: I interpret this pulse ox as normal.  Constitutional: Alert in no apparent distress.  HENT: No signs of trauma, Bilateral external " ears normal, Nose normal.  Left tonsillar swelling with exudates.  Uvula is midline.  No soft palatal swelling.  Eyes: Pupils are equal and reactive, Conjunctiva normal, Non-icteric.   Neck: Normal range of motion, No tenderness, Supple, No stridor.   Lymphatic: Left anterior cervical lymphadenopathy  Cardiovascular: Regular rate and rhythm.   Thorax & Lungs: Normal breath sounds, No respiratory distress  Skin: Warm, Dry, No erythema, No rash.       DIAGNOSTIC STUDIES / PROCEDURES      LABS  Labs Reviewed   GROUP A STREP BY PCR       RADIOLOGY  No orders to display       COURSE & MEDICAL DECISION MAKING    Medications   hyoscyamine-maalox plus-lidocaine viscous (GI COCKTAIL) oral susp 15 mL (15 mL Oral Given 11/12/19 1130)   acetaminophen (TYLENOL) tablet 650 mg (650 mg Oral Given 11/12/19 1130)       Pertinent Labs & Imaging studies reviewed. (See chart for details)  24 y.o. female presenting with tonsillar swelling with exudates along with left anterior cervical lymphadenopathy.  Has a history of strep pharyngitis in the past.  No fever.  No airway compromise.  No obvious signs of peritonsillar abscess.  No dental pain or dental swelling.  Patient is not septic in appearance.  Speaking with clear voice.  Strep test was negative.  No evidence of strep pharyngitis.  Most likely viral pharyngitis.  Mononucleosis is a possibility though in the setting, I believe it to be less likely.  We will see how the patient does over the next few days.  Should her symptoms persist, to reconsider diagnosis of possible mononucleosis.  No evidence of peritonsillar abscess.  Low suspicion for retropharyngeal abscess.    Patient was treated with symptom medic management with acetaminophen and GI cocktail for viscous lidocaine and topical pain management.  Will discharge home with Magic mouthwash swish and spit along with NSAIDs.    The patient was instructed to follow-up with primary care physician for further management.  To return  "immediately for any worsening symptoms or development of any other concerning signs or symptoms. The patient verbalizes understanding in their own words.    /83   Pulse 81   Temp 36.8 °C (98.3 °F) (Oral)   Resp 16   Ht 1.676 m (5' 6\")   Wt 75.9 kg (167 lb 5.3 oz)   LMP 11/05/2019   SpO2 98%   BMI 27.01 kg/m²     The patient was referred to primary care where they will receive further BP management.        FINAL IMPRESSION  1. Viral pharyngitis            Electronically signed by: George Llanes, 11/12/2019 10:21 AM    "

## 2020-01-03 ENCOUNTER — HOSPITAL ENCOUNTER (OUTPATIENT)
Dept: LAB | Facility: MEDICAL CENTER | Age: 25
End: 2020-01-03
Attending: OBSTETRICS & GYNECOLOGY
Payer: MEDICAID

## 2020-01-03 ENCOUNTER — INITIAL PRENATAL (OUTPATIENT)
Dept: OBGYN | Facility: CLINIC | Age: 25
End: 2020-01-03
Payer: MEDICAID

## 2020-01-03 DIAGNOSIS — O20.0 THREATENED MISCARRIAGE: ICD-10-CM

## 2020-01-03 LAB — B-HCG SERPL-ACNC: ABNORMAL MIU/ML (ref 0–5)

## 2020-01-03 PROCEDURE — 76830 TRANSVAGINAL US NON-OB: CPT | Performed by: OBSTETRICS & GYNECOLOGY

## 2020-01-03 PROCEDURE — 99214 OFFICE O/P EST MOD 30 MIN: CPT | Mod: 25 | Performed by: OBSTETRICS & GYNECOLOGY

## 2020-01-03 PROCEDURE — 36415 COLL VENOUS BLD VENIPUNCTURE: CPT

## 2020-01-03 PROCEDURE — 84702 CHORIONIC GONADOTROPIN TEST: CPT

## 2020-01-03 NOTE — PROGRESS NOTES
Chief complaint;  This patient is a 24 y.o. female , Patient's last menstrual period was 2019. presents complaining of dysfunctional uterine bleeding.  She has 4 healthy living children she has had miscarriages and terminations of pregnancy.  She last had a miscarriage in 2019 and she is concerned.  Patient is having significant nausea but no vomiting.  Denies vaginal bleeding or abdominal pain/pelvic pain    Review of systems-denies; chest pain, shortness of breath, fever, chills, abdominal pain  Past OB history-  OB History    Para Term  AB Living   13 4 4 0 8 4   SAB TAB Ectopic Molar Multiple Live Births   0 5 0     4      # Outcome Date GA Lbr Ke/2nd Weight Sex Delivery Anes PTL Lv   13 Current            12 TAB            11 Term 17 38w1d  2.955 kg (6 lb 8.2 oz) F Vag-Spont  N PASCUAL   10 Term 04/07/15 40w0d  3.062 kg (6 lb 12 oz) M Vag-Spont None Y PASCUAL      Birth Comments: Pt states no complications   9 TAB 2014     TAB         Birth Comments: NO COMPLICATIONS   8 AB  6w0d             Birth Comments: Pt states no complications   7 TAB 2012     TAB         Birth Comments: NO COMPLICATIONS   6 Term 12 39w5d  2.92 kg (6 lb 7 oz) F Vag-Spont EPI N PASCUAL      Birth Comments: Pt states no complications   5 AB  8w0d             Birth Comments: Pt. states wasnt ready for a baby.   4 Term 12/28/10 38w3d  2.693 kg (5 lb 15 oz) F Vag-Spont EPI Y PASCUAL      Birth Comments: Pt. states no complications.   3 AB  8w0d             Birth Comments: Pt. states couldnt afford the baby so decided to have    2 TAB  6w0d             Birth Comments: no compl, some regret, feels mother forced her.     1 TAB  6w0d             Birth Comments: no compl, still feels some regret, feels forced by mother.     Past surgical history- History reviewed. No pertinent surgical history.  Past medical history-   Past Medical History:   Diagnosis Date   • Anemia    •  Blood transfusion without reported diagnosis     2017   • Chalazion of right upper eyelid 3/7/2017   • Nausea and vomiting in pregnancy     2010,2014   • Pregnant      Allergies- Patient has no known allergies.  Present medications-   No outpatient encounter medications on file as of 1/3/2020.     No facility-administered encounter medications on file as of 1/3/2020.      Family history- no history of breast or ovarian cancer  Social history-   Social History     Socioeconomic History   • Marital status: Single     Spouse name: Not on file   • Number of children: Not on file   • Years of education: Not on file   • Highest education level: Not on file   Occupational History   • Not on file   Social Needs   • Financial resource strain: Not on file   • Food insecurity:     Worry: Not on file     Inability: Not on file   • Transportation needs:     Medical: Not on file     Non-medical: Not on file   Tobacco Use   • Smoking status: Current Every Day Smoker     Years: 1.00   • Smokeless tobacco: Never Used   • Tobacco comment: Stopped smoking once she found out she was pregnant   Substance and Sexual Activity   • Alcohol use: No   • Drug use: No   • Sexual activity: Yes     Partners: Male     Birth control/protection: Condom     Comment: Unplanne pregnancy   Lifestyle   • Physical activity:     Days per week: Not on file     Minutes per session: Not on file   • Stress: Not on file   Relationships   • Social connections:     Talks on phone: Not on file     Gets together: Not on file     Attends Protestant service: Not on file     Active member of club or organization: Not on file     Attends meetings of clubs or organizations: Not on file     Relationship status: Not on file   • Intimate partner violence:     Fear of current or ex partner: Not on file     Emotionally abused: Not on file     Physically abused: Not on file     Forced sexual activity: Not on file   Other Topics Concern   • Not on file   Social History  Narrative    ** Merged History Encounter **              Physical examination;   Alert and oriented x3  General-well-developed well-nourished female in no apparent distress  There were no vitals filed for this visit.  Skin is warm and dry   HEENT-normocephalic,nontraumatic,PERRLA,EOMI  Throat- no edema or erythema  Neck-supple  Cardiovascular-regular rate and rhythm, normal S1-S2 without murmurs or gallops  Lungs-clear to auscultation bilaterally  Back-negative CVA tenderness  Abdomen-nondistended positive bowel sounds soft nontender without masses or hepatosplenomegaly  Pelvic examination;  External genitalia-without lesions  Vagina-no blood, no discharge  Cervix-closed,no gross lesions  Uterus-6 weeks size, nontender  Adnexa- without mass or tenderness  Extremities-without cyanosis clubbing or edema  Neurologic-grossly intact    Transvaginal ultrasound; as performed and read by myself; intrauterine gestational sac with small yolk sac no fetal pole is realized.  No free pelvic fluid no adnexal masses    Impression;  Threatened AB    Plan;   hCG titer today and Monday  Follow-up after hCG titers      25 Minutes total spent with the patient in face-to-face contact,5 minutes of total time utilized to perform  Ultrasound, greater than 50% of the time has been spent on counseling and coordination of care. Many questions answered regarding possible miscarriage.

## 2020-01-06 ENCOUNTER — HOSPITAL ENCOUNTER (OUTPATIENT)
Dept: LAB | Facility: MEDICAL CENTER | Age: 25
End: 2020-01-06
Attending: OBSTETRICS & GYNECOLOGY
Payer: MEDICAID

## 2020-01-06 PROCEDURE — 36415 COLL VENOUS BLD VENIPUNCTURE: CPT

## 2020-01-06 PROCEDURE — 84702 CHORIONIC GONADOTROPIN TEST: CPT

## 2020-01-07 LAB — B-HCG SERPL-ACNC: ABNORMAL MIU/ML (ref 0–5)

## 2020-01-08 ENCOUNTER — GYNECOLOGY VISIT (OUTPATIENT)
Dept: OBGYN | Facility: CLINIC | Age: 25
End: 2020-01-08
Payer: MEDICAID

## 2020-01-08 VITALS
DIASTOLIC BLOOD PRESSURE: 72 MMHG | HEIGHT: 65 IN | SYSTOLIC BLOOD PRESSURE: 120 MMHG | BODY MASS INDEX: 27.32 KG/M2 | WEIGHT: 164 LBS

## 2020-01-08 DIAGNOSIS — N91.2 AMENORRHEA: ICD-10-CM

## 2020-01-08 PROCEDURE — 99212 OFFICE O/P EST SF 10 MIN: CPT | Performed by: OBSTETRICS & GYNECOLOGY

## 2020-01-08 NOTE — PROGRESS NOTES
" 24 y.o.  female previously seen for : Chief Complaint:  amenorrhea    Specialty Problems     None      . Patient now here in follow up.  Patient was seen on 1/3/2020 for an initial prenatal exam.  Ultrasound at that time demonstrated an intrauterine gestational sac with a small yolk sac but no fetal pole was realized.  hCG levels were then ordered to be done serially.  hCG level on 1/3/2020 was 20,306.3.  hCG level on 2020 was 39,525.7.  This is reassuring of a normal pregnancy.  Patient denies any vaginal bleeding but does admit to slight uterine cramping.    Patient's last menstrual period was 2019 (exact date).    Review of Systems was reviewed and was negative except for the pertinent positives listed in the HPI above     Current Outpatient Medications:   •  Prenatal MV-Min-Fe Fum-FA-DHA (PRENATAL 1 PO), Take  by mouth., Disp: , Rfl:   ROS: no change in ROS since visit of : 10/4/2019.  :  Recent Results (from the past 336 hour(s))   HCG QUANTITATIVE    Collection Time: 20 10:40 AM   Result Value Ref Range    c 92510.3 (H) 0.0 - 5.0 mIU/mL   HCG QUANTITATIVE    Collection Time: 20 10:34 AM   Result Value Ref Range    Bhcg 14632.7 (H) 0.0 - 5.0 mIU/mL       Vitals:    20 0829   BP: 120/72   BP Location: Left arm   Patient Position: Sitting   Weight: 74.4 kg (164 lb)   Height: 1.651 m (5' 5\")     Past Medical History:   Diagnosis Date   • Anemia    • Blood transfusion without reported diagnosis        • Chalazion of right upper eyelid 3/7/2017   • Nausea and vomiting in pregnancy     ,   • Pregnant      PGYN: None  Social History     Socioeconomic History   • Marital status: Single     Spouse name: Not on file   • Number of children: Not on file   • Years of education: Not on file   • Highest education level: Not on file   Occupational History   • Not on file   Social Needs   • Financial resource strain: Not on file   • Food insecurity:     Worry: Not on file     " Inability: Not on file   • Transportation needs:     Medical: Not on file     Non-medical: Not on file   Tobacco Use   • Smoking status: Current Every Day Smoker     Packs/day: 0.00     Years: 1.00     Pack years: 0.00   • Smokeless tobacco: Never Used   • Tobacco comment: Stopped smoking once she found out she was pregnant   Substance and Sexual Activity   • Alcohol use: No   • Drug use: No   • Sexual activity: Not Currently     Partners: Male     Birth control/protection: Condom     Comment: Unplanned pregnancy   Lifestyle   • Physical activity:     Days per week: Not on file     Minutes per session: Not on file   • Stress: Not on file   Relationships   • Social connections:     Talks on phone: Not on file     Gets together: Not on file     Attends Mormon service: Not on file     Active member of club or organization: Not on file     Attends meetings of clubs or organizations: Not on file     Relationship status: Not on file   • Intimate partner violence:     Fear of current or ex partner: Not on file     Emotionally abused: Not on file     Physically abused: Not on file     Forced sexual activity: Not on file   Other Topics Concern   • Not on file   Social History Narrative    ** Merged History Encounter **          Family History   Problem Relation Age of Onset   • No Known Problems Mother    • No Known Problems Father    • No Known Problems Sister    • No Known Problems Brother      History reviewed. No pertinent surgical history.      Exam:   Gen: A&O x 3, NAD  CV: RRR, no clubbing cyanosis or edema  Resp: Normal respiratory effort, clear to auscultation bilaterally  Skin: warm and dry  Psych: appropriate mood and affect  Pelvic: urethral meatus normal, no bladder tenderness, vulva normal no lesions, vaginal mucosa pink and moist, cervix normal no lesions, uterus midline and anteverted and nontender, bilateral adnexa nontender no masses palpated    Ass:   1. Amenorrhea-hCG levels doubling appropriately.   LMP 12/1/2019 giving a gestational age of 5 weeks and 3 days.  Still too early for ultrasound at this point.  Discussed that she should follow-up in 3 weeks for confirmation of pregnancy.  At this time since she will be 8 weeks which will be easier to identify on ultrasound.  SAB precautions discussed.  Encourage patient to take a prenatal vitamin as well as orally hydrate with 2 L of water daily.    Spent 10 minutes minutes with the patient ; Face to Face, with >50% of this time spent in counseling and coordination of care, surrounding the above mentioned issues.      P.     Follow up : 3 weeks for

## 2020-01-08 NOTE — PROGRESS NOTES
GYN visit to follow up on HCG titers  LMP: 12/01/2019  WT: 164   BP: 120/72  Pt states having a lot of nausea and vomiting, and heart burn. Denies any vaginal bleeding or any other complaints.   [place] # [phone]

## 2020-01-30 ENCOUNTER — GYNECOLOGY VISIT (OUTPATIENT)
Dept: OBGYN | Facility: CLINIC | Age: 25
End: 2020-01-30

## 2020-01-30 ENCOUNTER — HOSPITAL ENCOUNTER (OUTPATIENT)
Facility: MEDICAL CENTER | Age: 25
End: 2020-01-30
Attending: OBSTETRICS & GYNECOLOGY
Payer: MEDICAID

## 2020-01-30 ENCOUNTER — INITIAL PRENATAL (OUTPATIENT)
Dept: OBGYN | Facility: CLINIC | Age: 25
End: 2020-01-30
Payer: MEDICAID

## 2020-01-30 VITALS — BODY MASS INDEX: 27.46 KG/M2 | WEIGHT: 165 LBS | DIASTOLIC BLOOD PRESSURE: 82 MMHG | SYSTOLIC BLOOD PRESSURE: 120 MMHG

## 2020-01-30 DIAGNOSIS — Z32.00 VISIT FOR CONFIRMATION OF PREGNANCY TEST RESULT WITH PHYSICAL EXAM: ICD-10-CM

## 2020-01-30 DIAGNOSIS — N89.8 VAGINAL DISCHARGE: ICD-10-CM

## 2020-01-30 PROCEDURE — 87591 N.GONORRHOEAE DNA AMP PROB: CPT

## 2020-01-30 PROCEDURE — 99213 OFFICE O/P EST LOW 20 MIN: CPT | Mod: 25 | Performed by: OBSTETRICS & GYNECOLOGY

## 2020-01-30 PROCEDURE — 87491 CHLMYD TRACH DNA AMP PROBE: CPT

## 2020-01-30 PROCEDURE — 87510 GARDNER VAG DNA DIR PROBE: CPT

## 2020-01-30 PROCEDURE — 76857 US EXAM PELVIC LIMITED: CPT | Performed by: OBSTETRICS & GYNECOLOGY

## 2020-01-30 PROCEDURE — 87480 CANDIDA DNA DIR PROBE: CPT

## 2020-01-30 PROCEDURE — 87660 TRICHOMONAS VAGIN DIR PROBE: CPT

## 2020-01-30 NOTE — PROGRESS NOTES
CC: Confirmation of viability    Ronit Tripathi,  24 y.o.  female with Patient's last menstrual period was 2019 (exact date). presents today with complaint of dysfunctional uterine bleeding.    Subjective : Patient presents to the office for absent menses.    Nausea/Vomiting: Yes    Abdominal /pelvic cramping: No  Vaginal bleeding: No  Positive home UPT January  Partner FOB of THIS PREGNANCY is not in life currently, FOB of previous 4 children is supportive and at bedside  Other symptoms: denies    Pertinent positives documented in HPI and all other systems reviewed & are negative    OB History    Para Term  AB Living   17 4 4 0 12 4   SAB TAB Ectopic Molar Multiple Live Births   4 5 0     4      # Outcome Date GA Lbr Ke/2nd Weight Sex Delivery Anes PTL Lv   17 Current            16 2019           15 2019           14 2019           13 2019           12 TAB 2019           11 Term 17 38w1d  2.955 kg (6 lb 8.2 oz) F Vag-Spont  N PASCUAL      Birth Comments: hemorraghe after delivery   10 Term 04/07/15 40w0d  3.062 kg (6 lb 12 oz) M Vag-Spont None Y PASCUAL      Birth Comments: Pt states no complications   9 TAB 2014     TAB         Birth Comments: NO COMPLICATIONS   8 AB  6w0d             Birth Comments: Pt states no complications   7 TAB 2012     TAB         Birth Comments: NO COMPLICATIONS   6 Term 12 39w5d  2.92 kg (6 lb 7 oz) F Vag-Spont EPI N PASCUAL      Birth Comments: Pt states no complications   5 AB  8w0d             Birth Comments: Pt. states wasnt ready for a baby.   4 Term 12/28/10 38w3d  2.693 kg (5 lb 15 oz) F Vag-Spont EPI Y PASCUAL      Birth Comments: Pt. states no complications.   3 AB  8w0d             Birth Comments: Pt. states couldnt afford the baby so decided to have    2 TAB  6w0d             Birth Comments: no compl, some regret, feels mother forced her.     1 TAB  6w0d             Birth Comments: no compl, still  feels some regret, feels forced by mother.       Past Gyn history: last pap 2016, hx STDs denies    Past Medical History:   Diagnosis Date   • Anemia    • Blood transfusion without reported diagnosis        • Chalazion of right upper eyelid 3/7/2017   • Nausea and vomiting in pregnancy     ,   • Pregnant        History reviewed. No pertinent surgical history.    Meds: PNV    Allergies: Patient has no known allergies.    Physical Exam:    LMP 2019 (Exact Date)   Gen: well-appearing, well-hydrated, well-nourished  Abd: abdomen is soft without significant tenderness, masses, organomegaly or guarding  Pelvic: External genitalia normal, Urethra without abnormality or discharge, no CMT  Ext: NT bilaterally, no cyanosis, clubbing or edema    No results found for this or any previous visit (from the past 336 hour(s)).    Transvaginal US performed and per my read:    Indication: Confirmation of pregnancy    Findings: cartwright intrauterine pregnancy @ 9+3 by CRL.   Positive gestational sac.  Positive yolk sac.   Positive fetal cardiac activity @ 162 BPM.   Right ovary not seen. Left Ovary not seen. Cervical length 3.4cm.   No free fluid in the cul-de-sac.    Impression: viable IUP @ 8+4. EDC by LMP=1st trimester US BLAYNE 2020      Assessment:  24 y.o. with here for viability  Pregnancy exam/test positive   @8+4 by LMP=1st trimester US    Plan:  Needs recurrent miscarriage w/u: has 12 abortions, 8 were VTP and 4 were 1st trimester miscarriages from this year.  Questionable if the miscarriages are with the same boyfriend with the FOB of her previous 4 children.   2 weeks for new OB appt  Normal pregnancy symptoms discussed  SAB/labor precautions educated  Call office w/ questions or concerns

## 2020-01-30 NOTE — PROGRESS NOTES
CC: Confirmation of viability    Ronit Tripathi,  24 y.o.  female with Patient's last menstrual period was 2019 (exact date). presents today with complaint of dysfunctional uterine bleeding.    Subjective : Patient presents to the office for absent menses.    Nausea/Vomiting: Yes    Abdominal /pelvic cramping: No  Vaginal bleeding: Yes  Positive home UPT January  Partner Feng  Other symptoms: denies    Pertinent positives documented in HPI and all other systems reviewed & are negative    OB History    Para Term  AB Living   17 4 4 0 12 4   SAB TAB Ectopic Molar Multiple Live Births   4 5 0     4      # Outcome Date GA Lbr Ke/2nd Weight Sex Delivery Anes PTL Lv   17 Current            16 2019           15 2019           14 2019           13 2019           12 TAB            11 Term 17 38w1d  2.955 kg (6 lb 8.2 oz) F Vag-Spont  N PASCUAL      Birth Comments: hemorraghe after delivery   10 Term 04/07/15 40w0d  3.062 kg (6 lb 12 oz) M Vag-Spont None Y PASCUAL      Birth Comments: Pt states no complications   9 TAB 2014     TAB         Birth Comments: NO COMPLICATIONS   8 AB  6w0d             Birth Comments: Pt states no complications   7 TAB 2012     TAB         Birth Comments: NO COMPLICATIONS   6 Term 12 39w5d  2.92 kg (6 lb 7 oz) F Vag-Spont EPI N PASCUAL      Birth Comments: Pt states no complications   5 AB  8w0d             Birth Comments: Pt. states wasnt ready for a baby.   4 Term 12/28/10 38w3d  2.693 kg (5 lb 15 oz) F Vag-Spont EPI Y PASCUAL      Birth Comments: Pt. states no complications.   3 AB  8w0d             Birth Comments: Pt. states couldnt afford the baby so decided to have    2 TAB  6w0d             Birth Comments: no compl, some regret, feels mother forced her.     1 TAB  6w0d             Birth Comments: no compl, still feels some regret, feels forced by mother.       Past Gyn history: last pap 2016, hx STDs  denies    Past Medical History:   Diagnosis Date   • Anemia    • Blood transfusion without reported diagnosis        • Chalazion of right upper eyelid 3/7/2017   • Nausea and vomiting in pregnancy     ,   • Pregnant        History reviewed. No pertinent surgical history.    Meds: PNV    Allergies: Patient has no known allergies.    Physical Exam:    /82   Wt 74.8 kg (165 lb)   LMP 2019 (Exact Date)   BMI 27.46 kg/m²   Gen: well-appearing, well-hydrated, well-nourished  Abd: abdomen is soft without significant tenderness, masses, organomegaly or guarding  Pelvic: Normal external genitalia, Urethra without abnormality or discharge, no CMT  Ext: NT bilaterally, no cyanosis, clubbing or edema    No results found for this or any previous visit (from the past 336 hour(s)).    Transvaginal US performed and per my read:    Indication: Confirmation of pregnancy     Findings: cartwright intrauterine pregnancy @9+3 by CRL.   Positive gestational sac.  Positive yolk sac.   Positive fetal cardiac activity @ 164 BPM.   Right ovary not seen. Left Ovary not seen. Cervical length >3.5cm.   No free fluid in the cul-de-sac.    Impression: viable IUP @ 8+4. EDC by LMP=1st trimester US      Assessment:  24 y.o. here for viability  Pregnancy exam/test positive   8+4 by LMP=1st trimester US    Plan:  2 weeks for new OB appt  Normal pregnancy symptoms discussed  SAB/labor precautions educated  Call office w/ questions or concerns

## 2020-01-30 NOTE — NON-PROVIDER
today  LMP 12/01/2019  PNV yes  Phone # [phone]  Pharmacy verified with patient  C/o n/v, vomiting 3 times a day.

## 2020-01-30 NOTE — NON-PROVIDER
today  LMP 12/01/2019  PNV yes  Phone # [phone]  Pharmacy verified with patient  C/o n/v, vomiting about 3 times a day.

## 2020-01-31 LAB
C TRACH DNA SPEC QL NAA+PROBE: NEGATIVE
CANDIDA DNA VAG QL PROBE+SIG AMP: NEGATIVE
G VAGINALIS DNA VAG QL PROBE+SIG AMP: POSITIVE
N GONORRHOEA DNA SPEC QL NAA+PROBE: NEGATIVE
SPECIMEN SOURCE: NORMAL
T VAGINALIS DNA VAG QL PROBE+SIG AMP: NEGATIVE

## 2020-02-04 ENCOUNTER — TELEPHONE (OUTPATIENT)
Dept: OBGYN | Facility: CLINIC | Age: 25
End: 2020-02-04

## 2020-02-06 DIAGNOSIS — B96.89 BV (BACTERIAL VAGINOSIS): ICD-10-CM

## 2020-02-06 DIAGNOSIS — N76.0 BV (BACTERIAL VAGINOSIS): ICD-10-CM

## 2020-02-06 RX ORDER — METRONIDAZOLE 500 MG/1
500 TABLET ORAL 2 TIMES DAILY
Qty: 14 TAB | Refills: 0 | Status: SHIPPED | OUTPATIENT
Start: 2020-02-06 | End: 2020-02-13

## 2020-02-06 NOTE — TELEPHONE ENCOUNTER
Pt c/o cramping and pelvic pain, denies other complications. Advised to increase water intake, use tylenol OT ton help w/pain.   Also informed of positive results for BV. Rx will be place for pt to  today .    Verbalized understanding

## 2020-02-12 ENCOUNTER — HOSPITAL ENCOUNTER (EMERGENCY)
Facility: MEDICAL CENTER | Age: 25
End: 2020-02-12
Attending: EMERGENCY MEDICINE
Payer: MEDICAID

## 2020-02-12 ENCOUNTER — APPOINTMENT (OUTPATIENT)
Dept: RADIOLOGY | Facility: MEDICAL CENTER | Age: 25
End: 2020-02-12
Attending: EMERGENCY MEDICINE
Payer: MEDICAID

## 2020-02-12 VITALS
TEMPERATURE: 98.6 F | HEART RATE: 89 BPM | BODY MASS INDEX: 27.88 KG/M2 | DIASTOLIC BLOOD PRESSURE: 82 MMHG | RESPIRATION RATE: 16 BRPM | OXYGEN SATURATION: 96 % | SYSTOLIC BLOOD PRESSURE: 148 MMHG | WEIGHT: 173.5 LBS | HEIGHT: 66 IN

## 2020-02-12 DIAGNOSIS — O20.0 THREATENED MISCARRIAGE: ICD-10-CM

## 2020-02-12 LAB
ALBUMIN SERPL BCP-MCNC: 4.1 G/DL (ref 3.2–4.9)
ALBUMIN/GLOB SERPL: 1.1 G/DL
ALP SERPL-CCNC: 55 U/L (ref 30–99)
ALT SERPL-CCNC: 10 U/L (ref 2–50)
ANION GAP SERPL CALC-SCNC: 9 MMOL/L (ref 0–11.9)
APPEARANCE UR: CLEAR
APPEARANCE UR: CLEAR
AST SERPL-CCNC: 20 U/L (ref 12–45)
B-HCG SERPL-ACNC: ABNORMAL MIU/ML (ref 0–5)
BACTERIA #/AREA URNS HPF: ABNORMAL /HPF
BASOPHILS # BLD AUTO: 0.5 % (ref 0–1.8)
BASOPHILS # BLD: 0.05 K/UL (ref 0–0.12)
BILIRUB SERPL-MCNC: 0.3 MG/DL (ref 0.1–1.5)
BILIRUB UR QL STRIP.AUTO: NEGATIVE
BUN SERPL-MCNC: 8 MG/DL (ref 8–22)
CALCIUM SERPL-MCNC: 8.8 MG/DL (ref 8.5–10.5)
CHLORIDE SERPL-SCNC: 105 MMOL/L (ref 96–112)
CO2 SERPL-SCNC: 23 MMOL/L (ref 20–33)
COLOR UR AUTO: YELLOW
COLOR UR: YELLOW
CREAT SERPL-MCNC: 0.52 MG/DL (ref 0.5–1.4)
EOSINOPHIL # BLD AUTO: 0.16 K/UL (ref 0–0.51)
EOSINOPHIL NFR BLD: 1.6 % (ref 0–6.9)
EPI CELLS #/AREA URNS HPF: ABNORMAL /HPF
ERYTHROCYTE [DISTWIDTH] IN BLOOD BY AUTOMATED COUNT: 41.9 FL (ref 35.9–50)
GLOBULIN SER CALC-MCNC: 3.7 G/DL (ref 1.9–3.5)
GLUCOSE SERPL-MCNC: 76 MG/DL (ref 65–99)
GLUCOSE UR QL STRIP.AUTO: NEGATIVE MG/DL
GLUCOSE UR STRIP.AUTO-MCNC: NEGATIVE MG/DL
HCT VFR BLD AUTO: 41.9 % (ref 37–47)
HGB BLD-MCNC: 14.3 G/DL (ref 12–16)
HYALINE CASTS #/AREA URNS LPF: ABNORMAL /LPF
IMM GRANULOCYTES # BLD AUTO: 0.02 K/UL (ref 0–0.11)
IMM GRANULOCYTES NFR BLD AUTO: 0.2 % (ref 0–0.9)
KETONES UR QL STRIP.AUTO: NEGATIVE MG/DL
KETONES UR STRIP.AUTO-MCNC: NEGATIVE MG/DL
LEUKOCYTE ESTERASE UR QL STRIP.AUTO: ABNORMAL
LEUKOCYTE ESTERASE UR QL STRIP.AUTO: NEGATIVE
LIPASE SERPL-CCNC: 62 U/L (ref 11–82)
LYMPHOCYTES # BLD AUTO: 2.47 K/UL (ref 1–4.8)
LYMPHOCYTES NFR BLD: 24 % (ref 22–41)
MCH RBC QN AUTO: 28.6 PG (ref 27–33)
MCHC RBC AUTO-ENTMCNC: 34.1 G/DL (ref 33.6–35)
MCV RBC AUTO: 83.8 FL (ref 81.4–97.8)
MICRO URNS: ABNORMAL
MONOCYTES # BLD AUTO: 0.65 K/UL (ref 0–0.85)
MONOCYTES NFR BLD AUTO: 6.3 % (ref 0–13.4)
NEUTROPHILS # BLD AUTO: 6.94 K/UL (ref 2–7.15)
NEUTROPHILS NFR BLD: 67.4 % (ref 44–72)
NITRITE UR QL STRIP.AUTO: NEGATIVE
NITRITE UR QL STRIP.AUTO: NEGATIVE
NRBC # BLD AUTO: 0 K/UL
NRBC BLD-RTO: 0 /100 WBC
NUMBER OF RH DOSES IND 8505RD: NORMAL
PH UR STRIP.AUTO: 8.5 [PH] (ref 5–8)
PH UR STRIP.AUTO: >=9 [PH] (ref 5–8)
PLATELET # BLD AUTO: 274 K/UL (ref 164–446)
PMV BLD AUTO: 9.8 FL (ref 9–12.9)
POTASSIUM SERPL-SCNC: 3.8 MMOL/L (ref 3.6–5.5)
PROT SERPL-MCNC: 7.8 G/DL (ref 6–8.2)
PROT UR QL SSA: NEGATIVE MG/DL
PROT UR QL STRIP: ABNORMAL MG/DL
PROT UR QL STRIP: NEGATIVE MG/DL
RBC # BLD AUTO: 5 M/UL (ref 4.2–5.4)
RBC # URNS HPF: ABNORMAL /HPF
RBC UR QL AUTO: ABNORMAL
RBC UR QL AUTO: ABNORMAL
RH BLD: NORMAL
SODIUM SERPL-SCNC: 137 MMOL/L (ref 135–145)
SP GR UR STRIP.AUTO: 1.02
SP GR UR: 1.02 (ref 1–1.03)
UROBILINOGEN UR STRIP.AUTO-MCNC: 0.2 MG/DL
WBC # BLD AUTO: 10.3 K/UL (ref 4.8–10.8)
WBC #/AREA URNS HPF: ABNORMAL /HPF

## 2020-02-12 PROCEDURE — 99284 EMERGENCY DEPT VISIT MOD MDM: CPT

## 2020-02-12 PROCEDURE — 81002 URINALYSIS NONAUTO W/O SCOPE: CPT

## 2020-02-12 PROCEDURE — 85025 COMPLETE CBC W/AUTO DIFF WBC: CPT

## 2020-02-12 PROCEDURE — 76801 OB US < 14 WKS SINGLE FETUS: CPT

## 2020-02-12 PROCEDURE — 36415 COLL VENOUS BLD VENIPUNCTURE: CPT

## 2020-02-12 PROCEDURE — 83690 ASSAY OF LIPASE: CPT

## 2020-02-12 PROCEDURE — 86901 BLOOD TYPING SEROLOGIC RH(D): CPT

## 2020-02-12 PROCEDURE — 84702 CHORIONIC GONADOTROPIN TEST: CPT

## 2020-02-12 PROCEDURE — 80053 COMPREHEN METABOLIC PANEL: CPT

## 2020-02-12 PROCEDURE — 81001 URINALYSIS AUTO W/SCOPE: CPT

## 2020-02-12 ASSESSMENT — ENCOUNTER SYMPTOMS
CHILLS: 0
FEVER: 0
VOMITING: 1
ROS GI COMMENTS: POSITIVE FOR ABDOMINAL CRAMPING

## 2020-02-12 NOTE — DISCHARGE INSTRUCTIONS
Rest, no intercourse.  Follow with pregnancy center.  Return for more bleeding, pain or other concerns.  Fill the prescription you are provided last week.

## 2020-02-12 NOTE — ED TRIAGE NOTES
Ambulatory to triage with   Chief Complaint   Patient presents with   • Vaginal Bleeding   • Pregnancy   • Cramping   Sudden gush or bright red blood. Has picture on phone. Grava 13; para 4. Denies dysuria, fever, or chills. States she was supposed to fill a prescription for bacterial vaginosis on Friday, however she did not fill the prescriptions.

## 2020-02-12 NOTE — ED PROVIDER NOTES
ED Provider Note    Scribed for Poncho Resendiz M.D. by Aron Doherty. 2020, 10:11 AM.    Primary care provider: None noted  Means of arrival: Walk in  History obtained from: Patient  History limited by: None    CHIEF COMPLAINT  Chief Complaint   Patient presents with   • Vaginal Bleeding   • Pregnancy   • Cramping       HPI  Ronit Tripathi is a 24 y.o. female who presents to the Emergency Department for evaluation of vaginal bleeding onset this morning at 7:30 AM. She says she was climbing stairs when she felt a rush of blood. She states the amount of blood was less than a normal period. The patient is 10 weeks pregnant, has had an ultrasound and [facility]. She is . She admits to additional symptoms of moderate abdominal cramping, and vomiting, but denies dysuria, leg swelling, fever or chills. She reports she was supposed to fill a prescription for bacterial vaginosis 5 days ago, but she did not. She denies tubal pregnancies or tubal infections. She denies other medical problems. She denies allergies to medication. She denies tobacco or alcohol abuse.     REVIEW OF SYSTEMS  Review of Systems   Constitutional: Negative for chills and fever.   Cardiovascular: Negative for leg swelling.   Gastrointestinal: Positive for vomiting.        Positive for abdominal cramping   Genitourinary: Negative for dysuria.        Positive for vaginal bleeding   All other systems reviewed and are negative.      PAST MEDICAL HISTORY   has a past medical history of Anemia, Blood transfusion without reported diagnosis, Chalazion of right upper eyelid (3/7/2017), Nausea and vomiting in pregnancy, and Pregnant.    SURGICAL HISTORY  patient denies any surgical history    SOCIAL HISTORY  Social History     Tobacco Use   • Smoking status: Former Smoker     Packs/day: 0.00     Years: 1.00     Pack years: 0.00   • Smokeless tobacco: Never Used   • Tobacco comment: Stopped smoking once she found  "out she was pregnant   Substance Use Topics   • Alcohol use: No   • Drug use: Yes     Types: Marijuana      Social History     Substance and Sexual Activity   Drug Use Yes   • Types: Marijuana       FAMILY HISTORY  Family History   Problem Relation Age of Onset   • No Known Problems Mother    • No Known Problems Father    • No Known Problems Sister    • No Known Problems Brother        CURRENT MEDICATIONS  Home Medications     Reviewed by Chichi Morales R.N. (Registered Nurse) on 02/12/20 at 0936  Med List Status: <None>   Medication Last Dose Status   metroNIDAZOLE (FLAGYL) 500 MG Tab  Active   Prenatal MV-Min-Fe Fum-FA-DHA (PRENATAL 1 PO)  Active                ALLERGIES  No Known Allergies    PHYSICAL EXAM  VITAL SIGNS: /87   Pulse 95   Temp 36.6 °C (97.9 °F) (Temporal)   Resp 14   Ht 1.676 m (5' 6\")   Wt 78.7 kg (173 lb 8 oz)   LMP 12/01/2019 (Exact Date)   SpO2 98%   BMI 28.00 kg/m²   Vitals reviewed.  Constitutional: Well developed, Well nourished, No acute distress, Non-toxic appearance.   HENT: Normocephalic, Atraumatic, Bilateral external ears normal, Oropharynx moist, No oral exudates, Nose normal.   Eyes: PERRL, EOMI, Conjunctiva normal, No discharge.   Cardiovascular: Normal heart rate, Normal rhythm, No murmurs, No rubs, No gallops.   Thorax & Lungs: Normal breath sounds, No respiratory distress, No wheezing  Abdomen: Gravid abdomen. Bowel sounds normal, soft nontender.  Skin: Warm, Dry, No erythema, No rash.   Back: No tenderness, No CVA tenderness.   Musculoskeletal: Good range of motion in all major joints.   Neurologic: Alert,No focal deficits noted.   Psychiatric: Affect normal    LABS  Results for orders placed or performed during the hospital encounter of 02/12/20   CBC WITH DIFFERENTIAL   Result Value Ref Range    WBC 10.3 4.8 - 10.8 K/uL    RBC 5.00 4.20 - 5.40 M/uL    Hemoglobin 14.3 12.0 - 16.0 g/dL    Hematocrit 41.9 37.0 - 47.0 %    MCV 83.8 81.4 - 97.8 fL    MCH 28.6 " 27.0 - 33.0 pg    MCHC 34.1 33.6 - 35.0 g/dL    RDW 41.9 35.9 - 50.0 fL    Platelet Count 274 164 - 446 K/uL    MPV 9.8 9.0 - 12.9 fL    Neutrophils-Polys 67.40 44.00 - 72.00 %    Lymphocytes 24.00 22.00 - 41.00 %    Monocytes 6.30 0.00 - 13.40 %    Eosinophils 1.60 0.00 - 6.90 %    Basophils 0.50 0.00 - 1.80 %    Immature Granulocytes 0.20 0.00 - 0.90 %    Nucleated RBC 0.00 /100 WBC    Neutrophils (Absolute) 6.94 2.00 - 7.15 K/uL    Lymphs (Absolute) 2.47 1.00 - 4.80 K/uL    Monos (Absolute) 0.65 0.00 - 0.85 K/uL    Eos (Absolute) 0.16 0.00 - 0.51 K/uL    Baso (Absolute) 0.05 0.00 - 0.12 K/uL    Immature Granulocytes (abs) 0.02 0.00 - 0.11 K/uL    NRBC (Absolute) 0.00 K/uL   COMP METABOLIC PANEL   Result Value Ref Range    Sodium 137 135 - 145 mmol/L    Potassium 3.8 3.6 - 5.5 mmol/L    Chloride 105 96 - 112 mmol/L    Co2 23 20 - 33 mmol/L    Anion Gap 9.0 0.0 - 11.9    Glucose 76 65 - 99 mg/dL    Bun 8 8 - 22 mg/dL    Creatinine 0.52 0.50 - 1.40 mg/dL    Calcium 8.8 8.5 - 10.5 mg/dL    AST(SGOT) 20 12 - 45 U/L    ALT(SGPT) 10 2 - 50 U/L    Alkaline Phosphatase 55 30 - 99 U/L    Total Bilirubin 0.3 0.1 - 1.5 mg/dL    Albumin 4.1 3.2 - 4.9 g/dL    Total Protein 7.8 6.0 - 8.2 g/dL    Globulin 3.7 (H) 1.9 - 3.5 g/dL    A-G Ratio 1.1 g/dL   LIPASE   Result Value Ref Range    Lipase 62 11 - 82 U/L   HCG QUANTITATIVE   Result Value Ref Range    Bhcg 36893.2 (H) 0.0 - 5.0 mIU/mL   URINALYSIS,CULTURE IF INDICATED   Result Value Ref Range    Color Yellow     Character Clear     Specific Gravity 1.017 <1.035    Ph >=9.0 (A) 5.0 - 8.0    Glucose Negative Negative mg/dL    Ketones Negative Negative mg/dL    Protein Negative Negative mg/dL    Bilirubin Negative Negative    Urobilinogen, Urine 0.2 Negative    Nitrite Negative Negative    Leukocyte Esterase Negative Negative    Occult Blood Moderate (A) Negative    Micro Urine Req Microscopic    RH TYPE FOR RHOGAM FROM E.D.   Result Value Ref Range    Emergency Department Rh  Typing POS     Number Of Rh Doses Indicated ZERO    URINE MICROSCOPIC (W/UA)   Result Value Ref Range    WBC 2-5 /hpf    RBC 5-10 (A) /hpf    Bacteria Few (A) None /hpf    Epithelial Cells Few /hpf    Hyaline Cast 0-2 /lpf   UR PROTEIN SSA   Result Value Ref Range    Protein Negative Negative mg/dL   ESTIMATED GFR   Result Value Ref Range    GFR If African American >60 >60 mL/min/1.73 m 2    GFR If Non African American >60 >60 mL/min/1.73 m 2   POC UA   Result Value Ref Range    POC Color Yellow     POC Appearance Clear     POC Glucose Negative Negative mg/dL    POC Ketones Negative Negative mg/dL    POC Specific Gravity 1.020 1.005 - 1.030    POC Blood Moderate (A) Negative    POC Urine PH 8.5 (A) 5.0 - 8.0    POC Protein Trace (A) Negative mg/dL    POC Nitrites Negative Negative    POC Leukocyte Esterase Trace (A) Negative      All labs reviewed by me.    RADIOLOGY  US-OB 1ST TRIMESTER WITH TRANSVAGINAL (COMBO)   Final Result      Viable single intrauterine gestation of an estimated 11 weeks 6 days estimated gestational age.        The radiologist's interpretation of all radiological studies have been reviewed by me.    COURSE & MEDICAL DECISION MAKING  Pertinent Labs & Imaging studies reviewed. (See chart for details)    10:11 AM Patient seen and examined at bedside. The patient presents with vaginal bleeding and abdominal cramping, and the differential diagnosis includes but is not limited to threatened miscarriage ectopic pregnancy, miscarriage in process.. I informed the patient of my plan to run diagnostic studies to evaluate their symptoms including ultrasound and labs. Patient verbalizes understanding and support with my plan of care.  Ordered for US-OB 1st triester with Transvaginal, RH Type for Rhogam, POC UA, CBC with diff, CMP, Lipase, HCG Quant, UA, Culture if indicated, POC UA, UA Protein SSA, Urine Microscopic, eGFR to evaluate.     11:42 AM - I reevaluated the patient at bedside. I discussed the  patient's diagnostic study results as noted above. The patient verbalizes they feel comfortable going home. The patient is stable for discharge at this time and will return for any new or worsening symptoms. I discussed plan for discharge and follow up as outlined below. Patient verbalizes understanding and support with my plan for discharge.      Rh is positive ultrasound confirms an IUP.  She has established primary care and OB follow-up she will follow-up.  She will fill the prescription from last week from her obstetrician.  She is given pelvic rest instructions.  She is to return for pain or other concerns per questions are answered she is agreeable to plan and discharged in good condition      The patient will return for new or worsening symptoms and is stable at the time of discharge.    The patient is referred to a primary physician for blood pressure management, diabetic screening, and for all other preventative health concerns.    DISPOSITION:  Patient will be discharged home in stable condition.    FOLLOW UP:  [facility]  [address] [place] [id number]  [phone]  Schedule an appointment as soon as possible for a visit in 2 days      FINAL IMPRESSION  1. Threatened miscarriage          Aron BLANKENSHIP (Giuliana), am scribing for, and in the presence of, Poncho Resendiz M.D..    Electronically signed by: Aron Doherty (Giuliana), 2/12/2020. Poncho MCLEAN M.D. personally performed the services described in this documentation, as scribed by Aron Doherty in my presence, and it is both accurate and complete.    The note accurately reflects work and decisions made by me.  Poncho Resendiz M.D.  2/12/2020  12:46 PM

## 2020-02-14 ENCOUNTER — DATING (OUTPATIENT)
Dept: OBGYN | Facility: CLINIC | Age: 25
End: 2020-02-14

## 2020-02-14 ENCOUNTER — GYNECOLOGY VISIT (OUTPATIENT)
Dept: OBGYN | Facility: CLINIC | Age: 25
End: 2020-02-14
Payer: MEDICAID

## 2020-02-14 VITALS — DIASTOLIC BLOOD PRESSURE: 60 MMHG | BODY MASS INDEX: 27.76 KG/M2 | WEIGHT: 172 LBS | SYSTOLIC BLOOD PRESSURE: 110 MMHG

## 2020-02-14 DIAGNOSIS — O20.0 THREATENED ABORTION: ICD-10-CM

## 2020-02-14 PROCEDURE — 90040 PR PRENATAL FOLLOW UP: CPT | Performed by: OBSTETRICS & GYNECOLOGY

## 2020-02-14 NOTE — PROGRESS NOTES
OB visit:  11w4d    Pt reports doing well.  Here for ED f/u.  Woke up with vaginal bleeding , reports she did have sex the night before, a little rough and painful.    In the ED US was performed and showed +IUP with +FHTs.    Spotting yesterday, now resolved.  No pain, feeling well today wtihout concerns.      /60   Wt 78 kg (172 lb)   LMP 2019 (Exact Date)   Breastfeeding No   BMI 27.76 kg/m²   gen: AAO, NAD  FHTs: 155 by doppler    A/P: 24 y.o.  0 12 4 @ 11w4d by 9wk US , VB resolved, threatened Ab  - dating changed today; reviewed [facility] visit where patient was 8 weeks 4 days by LMP, 9 weeks 3 days by ultrasound, given that this was greater than 5 days discrepancy BLAYNE changed to be dated by 9-week ultrasound which is consistent with a ED ultrasound yesterday.  Discussed final BLAYNE with patient 2020  - Rh+ in ED  - +FHTs today; discussed VB precautions.  - has NOB visit in about 2wks      Kusum Alcantara MD  [facility], Women's Health

## 2020-02-14 NOTE — PROGRESS NOTES
Pt here for ER F/U. Pt state she was spotting yesterday but only when wiping no need for a pad. Denies cramping  Good phone# [phone]  Pharmacy verified with pt.

## 2020-02-20 ENCOUNTER — OFFICE VISIT (OUTPATIENT)
Dept: URGENT CARE | Facility: PHYSICIAN GROUP | Age: 25
End: 2020-02-20

## 2020-02-20 VITALS
DIASTOLIC BLOOD PRESSURE: 64 MMHG | TEMPERATURE: 101 F | RESPIRATION RATE: 16 BRPM | HEIGHT: 66 IN | SYSTOLIC BLOOD PRESSURE: 120 MMHG | OXYGEN SATURATION: 99 % | WEIGHT: 169.8 LBS | BODY MASS INDEX: 27.29 KG/M2 | HEART RATE: 101 BPM

## 2020-02-20 DIAGNOSIS — J11.1 INFLUENZA: ICD-10-CM

## 2020-02-20 DIAGNOSIS — R50.9 FEVER, UNSPECIFIED FEVER CAUSE: ICD-10-CM

## 2020-02-20 LAB
FLUAV+FLUBV AG SPEC QL IA: NORMAL
INT CON NEG: NEGATIVE
INT CON POS: POSITIVE

## 2020-02-20 PROCEDURE — 99203 OFFICE O/P NEW LOW 30 MIN: CPT | Performed by: FAMILY MEDICINE

## 2020-02-20 PROCEDURE — 87804 INFLUENZA ASSAY W/OPTIC: CPT | Performed by: FAMILY MEDICINE

## 2020-02-20 RX ORDER — ACETAMINOPHEN 500 MG
1000 TABLET ORAL ONCE
Status: COMPLETED | OUTPATIENT
Start: 2020-02-20 | End: 2020-02-20

## 2020-02-20 RX ORDER — OSELTAMIVIR PHOSPHATE 75 MG/1
75 CAPSULE ORAL 2 TIMES DAILY
Qty: 10 CAP | Refills: 0 | Status: SHIPPED
Start: 2020-02-20 | End: 2020-04-21

## 2020-02-20 RX ADMIN — Medication 1000 MG: at 19:10

## 2020-02-21 ENCOUNTER — APPOINTMENT (OUTPATIENT)
Dept: RADIOLOGY | Facility: MEDICAL CENTER | Age: 25
End: 2020-02-21
Attending: EMERGENCY MEDICINE
Payer: MEDICAID

## 2020-02-21 ENCOUNTER — HOSPITAL ENCOUNTER (EMERGENCY)
Facility: MEDICAL CENTER | Age: 25
End: 2020-02-21
Attending: EMERGENCY MEDICINE
Payer: MEDICAID

## 2020-02-21 VITALS
HEART RATE: 76 BPM | RESPIRATION RATE: 18 BRPM | SYSTOLIC BLOOD PRESSURE: 106 MMHG | TEMPERATURE: 97.3 F | WEIGHT: 168.65 LBS | DIASTOLIC BLOOD PRESSURE: 63 MMHG | BODY MASS INDEX: 27.22 KG/M2 | OXYGEN SATURATION: 97 %

## 2020-02-21 DIAGNOSIS — O20.0 THREATENED MISCARRIAGE IN EARLY PREGNANCY: ICD-10-CM

## 2020-02-21 DIAGNOSIS — O44.02 PLACENTA PREVIA IN SECOND TRIMESTER: ICD-10-CM

## 2020-02-21 LAB
BASOPHILS # BLD AUTO: 0.2 % (ref 0–1.8)
BASOPHILS # BLD: 0.01 K/UL (ref 0–0.12)
EOSINOPHIL # BLD AUTO: 0.01 K/UL (ref 0–0.51)
EOSINOPHIL NFR BLD: 0.2 % (ref 0–6.9)
ERYTHROCYTE [DISTWIDTH] IN BLOOD BY AUTOMATED COUNT: 38.1 FL (ref 35.9–50)
HCT VFR BLD AUTO: 37.4 % (ref 37–47)
HGB BLD-MCNC: 13.3 G/DL (ref 12–16)
IMM GRANULOCYTES # BLD AUTO: 0.02 K/UL (ref 0–0.11)
IMM GRANULOCYTES NFR BLD AUTO: 0.4 % (ref 0–0.9)
LYMPHOCYTES # BLD AUTO: 0.98 K/UL (ref 1–4.8)
LYMPHOCYTES NFR BLD: 19.3 % (ref 22–41)
MCH RBC QN AUTO: 28.4 PG (ref 27–33)
MCHC RBC AUTO-ENTMCNC: 35.6 G/DL (ref 33.6–35)
MCV RBC AUTO: 79.7 FL (ref 81.4–97.8)
MONOCYTES # BLD AUTO: 0.82 K/UL (ref 0–0.85)
MONOCYTES NFR BLD AUTO: 16.2 % (ref 0–13.4)
NEUTROPHILS # BLD AUTO: 3.23 K/UL (ref 2–7.15)
NEUTROPHILS NFR BLD: 63.7 % (ref 44–72)
NRBC # BLD AUTO: 0 K/UL
NRBC BLD-RTO: 0 /100 WBC
PLATELET # BLD AUTO: 219 K/UL (ref 164–446)
PMV BLD AUTO: 9.8 FL (ref 9–12.9)
RBC # BLD AUTO: 4.69 M/UL (ref 4.2–5.4)
WBC # BLD AUTO: 5.1 K/UL (ref 4.8–10.8)

## 2020-02-21 PROCEDURE — 76801 OB US < 14 WKS SINGLE FETUS: CPT

## 2020-02-21 PROCEDURE — 99284 EMERGENCY DEPT VISIT MOD MDM: CPT

## 2020-02-21 PROCEDURE — 85025 COMPLETE CBC W/AUTO DIFF WBC: CPT

## 2020-02-21 ASSESSMENT — ENCOUNTER SYMPTOMS
FEVER: 1
SORE THROAT: 1
CHILLS: 1
HEADACHES: 1
FATIGUE: 1
VOMITING: 0
COUGH: 1

## 2020-02-21 NOTE — PATIENT INSTRUCTIONS

## 2020-02-21 NOTE — ED PROVIDER NOTES
ED Provider Note    CHIEF COMPLAINT  Chief Complaint   Patient presents with   • Vaginal Bleeding     onset at 0100, patient reports using 2 pads so far.    • Pregnancy     13 weeks , G13,P4   • Abdominal Cramping       HPI  Ronit Tripathi is a 24 y.o. female who presents for evaluation of lower abdominal pain, cramping vaginal bleeding described as moderate.  The patient has an extensive history of being multiparous.  She is approximately 13 weeks pregnant by dates.  She had intrauterine pregnancy confirmed here by ultrasound earlier in the month and is followed by the pregnancy center.  She denies any trauma.  No lightheaded or dizziness.  She has had multiple miscarriages as well as threatened abortions.  She denies any high fevers or chills.  No dysuria.  She passed some clots but no fetal tissue by report earlier today.  This is a naturally conceived pregnancy without any augmentation    REVIEW OF SYSTEMS  See HPI for further details.  No high fevers chills night sweats weight loss all other systems are negative.     PAST MEDICAL HISTORY  Past Medical History:   Diagnosis Date   • Chalazion of right upper eyelid 3/7/2017   • Anemia    • Blood transfusion without reported diagnosis     2017   • Nausea and vomiting in pregnancy     2010,2014   • Pregnant        FAMILY HISTORY  Noncontributory    SOCIAL HISTORY  Social History     Socioeconomic History   • Marital status: Single     Spouse name: Not on file   • Number of children: Not on file   • Years of education: Not on file   • Highest education level: Not on file   Occupational History   • Not on file   Social Needs   • Financial resource strain: Not on file   • Food insecurity     Worry: Not on file     Inability: Not on file   • Transportation needs     Medical: Not on file     Non-medical: Not on file   Tobacco Use   • Smoking status: Former Smoker     Packs/day: 0.00     Years: 1.00     Pack years: 0.00   • Smokeless tobacco: Never Used   • Tobacco  comment: Stopped smoking once she found out she was pregnant   Substance and Sexual Activity   • Alcohol use: No   • Drug use: Not Currently     Types: Marijuana   • Sexual activity: Not Currently     Partners: Male     Birth control/protection: Condom     Comment: Unplanned pregnancy   Lifestyle   • Physical activity     Days per week: Not on file     Minutes per session: Not on file   • Stress: Not on file   Relationships   • Social connections     Talks on phone: Not on file     Gets together: Not on file     Attends Worship service: Not on file     Active member of club or organization: Not on file     Attends meetings of clubs or organizations: Not on file     Relationship status: Not on file   • Intimate partner violence     Fear of current or ex partner: Not on file     Emotionally abused: Not on file     Physically abused: Not on file     Forced sexual activity: Not on file   Other Topics Concern   • Not on file   Social History Narrative    ** Merged History Encounter **            SURGICAL HISTORY  No past surgical history on file.    CURRENT MEDICATIONS  Home Medications     Reviewed by Rex Yung R.N. (Registered Nurse) on 02/21/20 at 0945  Med List Status: Complete   Medication Last Dose Status   oseltamivir (TAMIFLU) 75 MG Cap 2/20/2020 Active   Prenatal MV-Min-Fe Fum-FA-DHA (PRENATAL 1 PO) 2/20/2020 Active                ALLERGIES  No Known Allergies    PHYSICAL EXAM  VITAL SIGNS: BP (!) 93/68   Pulse 71   Temp 36.3 °C (97.3 °F) (Oral)   Resp 18   Wt 76.5 kg (168 lb 10.4 oz)   LMP 12/01/2019 (Exact Date)   SpO2 98%   BMI 27.22 kg/m²       Constitutional: Well developed, Well nourished, No acute distress, Non-toxic appearance.   HENT: Normocephalic, Atraumatic, Bilateral external ears normal, Oropharynx moist, No oral exudates, Nose normal.   Eyes: PERRLA, EOMI, Conjunctiva normal, No discharge.   Neck: Normal range of motion, No tenderness, Supple, No stridor.   Cardiovascular: Normal  heart rate, Normal rhythm, No murmurs, No rubs, No gallops.   Thorax & Lungs: Normal breath sounds, No respiratory distress, No wheezing, No chest tenderness.   Abdomen: Bowel sounds normal, Soft, No tenderness, No masses, No pulsatile masses.   Skin: Warm, Dry, No erythema, No rash.   Back: No tenderness, No CVA tenderness.   Genitalia: Patient deferred   extremities: Intact distal pulses, No edema, No tenderness, No cyanosis, No clubbing.   Neurologic: Alert & oriented x 3, Normal motor function, Normal sensory function, No focal deficits noted.   Psychiatric: Anxious    Results for orders placed or performed during the hospital encounter of 02/21/20   CBC WITH DIFFERENTIAL   Result Value Ref Range    WBC 5.1 4.8 - 10.8 K/uL    RBC 4.69 4.20 - 5.40 M/uL    Hemoglobin 13.3 12.0 - 16.0 g/dL    Hematocrit 37.4 37.0 - 47.0 %    MCV 79.7 (L) 81.4 - 97.8 fL    MCH 28.4 27.0 - 33.0 pg    MCHC 35.6 (H) 33.6 - 35.0 g/dL    RDW 38.1 35.9 - 50.0 fL    Platelet Count 219 164 - 446 K/uL    MPV 9.8 9.0 - 12.9 fL    Neutrophils-Polys 63.70 44.00 - 72.00 %    Lymphocytes 19.30 (L) 22.00 - 41.00 %    Monocytes 16.20 (H) 0.00 - 13.40 %    Eosinophils 0.20 0.00 - 6.90 %    Basophils 0.20 0.00 - 1.80 %    Immature Granulocytes 0.40 0.00 - 0.90 %    Nucleated RBC 0.00 /100 WBC    Neutrophils (Absolute) 3.23 2.00 - 7.15 K/uL    Lymphs (Absolute) 0.98 (L) 1.00 - 4.80 K/uL    Monos (Absolute) 0.82 0.00 - 0.85 K/uL    Eos (Absolute) 0.01 0.00 - 0.51 K/uL    Baso (Absolute) 0.01 0.00 - 0.12 K/uL    Immature Granulocytes (abs) 0.02 0.00 - 0.11 K/uL    NRBC (Absolute) 0.00 K/uL      US-OB 1ST TRIMESTER WITH TRANSVAGINAL (COMBO)   Final Result      1.  Viable single intrauterine gestation of an estimated 13 weeks 0 days with an estimated date of delivery of 8/23/2020.   2.  Subchorionic hemorrhage noted posteriorly adjacent to the lower uterine segment measuring approximately 4.6 cm. Follow-up is recommended.   3.  Partial placenta previa.             COURSE & MEDICAL DECISION MAKING  Pertinent Labs & Imaging studies reviewed. (See chart for details)  An IV is established.  Reviewed the patient's prior visit history.  She has known intrauterine pregnancy and already has an Rh+ status.  Her hemoglobin here has not significantly dropped no suggestion of infection.  Her ultrasound suggest a small subchorionic hemorrhage as well as partial placenta previa.  I counseled her that she needs to partake of pelvic rest and to follow-up with the pregnancy center for any new or worsening symptoms.  I counseled her to avoid heavy lifting etc.  Pregnancy is still appears to be viable    FINAL IMPRESSION  1.  Threatened miscarriage at early second trimester  2.  Subchorionic hemorrhage  3.  Partial placenta previa         Electronically signed by: Poncho Stover M.D., 2/21/2020 10:52 AM

## 2020-02-21 NOTE — ED TRIAGE NOTES
.Ronit Tripathi  .  Chief Complaint   Patient presents with   • Vaginal Bleeding     onset at 0100, patient reports using 2 pads so far.    • Pregnancy     13 weeks , G13,P4   • Abdominal Cramping     Patient to triage with above complaint. .Blood Pressure: 121/81, Pulse: (!) 110, Respiration: 18, Temperature: 36.3 °C (97.3 °F), Weight: 76.5 kg (168 lb 10.4 oz), Pulse Oximetry: 95 %    Mask applied in triage, patient reports dx with influenza yesterday.  Patient to lobby and instructed to inform staff of any needs.

## 2020-02-21 NOTE — PROGRESS NOTES
"Subjective:      Ronit Tripathi is a 24 y.o. female who presents with Headache (diarrhea,achy,chills, started lastnight )            Influenza   This is a new problem. The current episode started yesterday. The problem has been gradually worsening. Associated symptoms include chills, congestion, coughing, fatigue, a fever, headaches and a sore throat. Pertinent negatives include no rash or vomiting. Nothing aggravates the symptoms. She has tried nothing for the symptoms. The treatment provided no relief.   she has 4 other children and all have reportedly flu. She is in her 2nd trimester      Review of Systems   Constitutional: Positive for chills, fatigue and fever.   HENT: Positive for congestion and sore throat.    Respiratory: Positive for cough.    Gastrointestinal: Negative for vomiting.   Skin: Negative for rash.   Neurological: Positive for headaches.   All other systems reviewed and are negative.         Objective:     /64 (BP Location: Left arm, Patient Position: Sitting, BP Cuff Size: Adult)   Pulse (!) 101   Temp (!) 38.3 °C (101 °F) (Temporal)   Resp 16   Ht 1.676 m (5' 6\")   Wt 77 kg (169 lb 12.8 oz)   LMP 12/01/2019 (Exact Date)   SpO2 99%   BMI 27.41 kg/m²      Physical Exam  Constitutional:       General: She is not in acute distress.     Appearance: She is ill-appearing. She is not toxic-appearing or diaphoretic.   HENT:      Head: Normocephalic and atraumatic.      Right Ear: Tympanic membrane, ear canal and external ear normal.      Left Ear: Tympanic membrane, ear canal and external ear normal.      Nose: Congestion present.      Mouth/Throat:      Mouth: Mucous membranes are moist. No oral lesions.      Pharynx: Oropharynx is clear. Uvula midline. No pharyngeal swelling, oropharyngeal exudate, posterior oropharyngeal erythema or uvula swelling.      Tonsils: No tonsillar exudate or tonsillar abscesses.   Eyes:      Conjunctiva/sclera: Conjunctivae normal.   Neck:      " Musculoskeletal: Neck supple.   Cardiovascular:      Rate and Rhythm: Tachycardia present.      Heart sounds: No murmur. No friction rub. No gallop.    Pulmonary:      Effort: Pulmonary effort is normal. No respiratory distress.      Breath sounds: No stridor. No wheezing, rhonchi or rales.   Lymphadenopathy:      Cervical: No cervical adenopathy.   Skin:     General: Skin is warm.      Coloration: Skin is not jaundiced or pale.      Findings: No erythema or rash.   Neurological:      Mental Status: She is alert and oriented to person, place, and time.   Psychiatric:         Judgment: Judgment normal.       Results for orders placed or performed in visit on 02/20/20   POCT Influenza A/B   Result Value Ref Range    Rapid Influenza A-B Positive A     Internal Control Positive Positive     Internal Control Negative Negative                Assessment/Plan:   ASSESSMENT:PLAN:  1. Influenza  - oseltamivir (TAMIFLU) 75 MG Cap; Take 1 Cap by mouth 2 times a day.  Dispense: 10 Cap; Refill: 0    2. Fever, unspecified fever cause  - acetaminophen (TYLENOL) tablet 1,000 mg  - POCT Influenza A/B    Continue symptomatic care  Plan per orders and instructions  Warning signs reviewed

## 2020-02-21 NOTE — ED NOTES
Pt amb to restroom with steady gait. Provided pt with pads and disposable underwear. Pt to provide UA. Reports bleeding initially was bright red, now is brown and pink.

## 2020-02-25 ENCOUNTER — INITIAL PRENATAL (OUTPATIENT)
Dept: OBGYN | Facility: CLINIC | Age: 25
End: 2020-02-25
Payer: MEDICAID

## 2020-02-25 ENCOUNTER — HOSPITAL ENCOUNTER (OUTPATIENT)
Facility: MEDICAL CENTER | Age: 25
End: 2020-02-25
Attending: NURSE PRACTITIONER
Payer: MEDICAID

## 2020-02-25 VITALS
HEIGHT: 66 IN | SYSTOLIC BLOOD PRESSURE: 116 MMHG | WEIGHT: 169 LBS | DIASTOLIC BLOOD PRESSURE: 78 MMHG | BODY MASS INDEX: 27.16 KG/M2

## 2020-02-25 DIAGNOSIS — Z34.83 ENCOUNTER FOR SUPERVISION OF OTHER NORMAL PREGNANCY IN THIRD TRIMESTER: Primary | ICD-10-CM

## 2020-02-25 DIAGNOSIS — O41.8X90 SUBCHORIONIC HEMORRHAGE OF PLACENTA, ANTEPARTUM: ICD-10-CM

## 2020-02-25 DIAGNOSIS — O46.8X9 SUBCHORIONIC HEMORRHAGE OF PLACENTA, ANTEPARTUM: ICD-10-CM

## 2020-02-25 DIAGNOSIS — O09.299 HISTORY OF POSTPARTUM HEMORRHAGE, CURRENTLY PREGNANT: ICD-10-CM

## 2020-02-25 DIAGNOSIS — N96 HISTORY OF MULTIPLE MISCARRIAGES: ICD-10-CM

## 2020-02-25 DIAGNOSIS — O44.20: ICD-10-CM

## 2020-02-25 DIAGNOSIS — Z34.83 ENCOUNTER FOR SUPERVISION OF OTHER NORMAL PREGNANCY IN THIRD TRIMESTER: ICD-10-CM

## 2020-02-25 PROBLEM — H00.11 CHALAZION OF RIGHT UPPER EYELID: Status: RESOLVED | Noted: 2017-03-07 | Resolved: 2020-02-25

## 2020-02-25 PROBLEM — O36.5990 IUGR, ANTENATAL: Status: RESOLVED | Noted: 2017-02-08 | Resolved: 2020-02-25

## 2020-02-25 PROCEDURE — 59402 PR NEW OB HIGH RISK: CPT | Performed by: NURSE PRACTITIONER

## 2020-02-25 PROCEDURE — 88175 CYTOPATH C/V AUTO FLUID REDO: CPT

## 2020-02-25 PROCEDURE — 87491 CHLMYD TRACH DNA AMP PROBE: CPT

## 2020-02-25 PROCEDURE — 87591 N.GONORRHOEAE DNA AMP PROB: CPT

## 2020-02-25 ASSESSMENT — ENCOUNTER SYMPTOMS
RESPIRATORY NEGATIVE: 1
MUSCULOSKELETAL NEGATIVE: 1
CARDIOVASCULAR NEGATIVE: 1
CONSTITUTIONAL NEGATIVE: 1
PSYCHIATRIC NEGATIVE: 1
NEUROLOGICAL NEGATIVE: 1
EYES NEGATIVE: 1
GASTROINTESTINAL NEGATIVE: 1

## 2020-02-25 NOTE — PROGRESS NOTES
Pt. Here for NOB visit today.  # [phone]  First prenatal care  Pt. States she is feeling sick. Also having some spotting.   Pharmacy verified  Pt would like AFP  Pt declines CF, consent signed    Chaperone offered and not indicated

## 2020-02-25 NOTE — LETTER
Cystic Fibrosis Carrier Testing  Ronit Tripathi    The following information is about a blood test that can be done to determine if you and/or your partner carry the gene for cystic fibrosis.    WHAT IS CYSTIC FIBROSIS?  · Cystic fibrosis (CF) is an inherited disease that affects more than 25,000 [place] children and young adults.  · Symptoms of CF vary but include lung congestion, pneumonia, diarrhea and poor growth.  Most people with CF have severe medical problems and some die at a young age.  Others have so few symptoms they are unaware they have CF.  · CF does not affect intelligence.  · Although there is no cure for CF at this time, scientists are making progress in improving treatment and in searching for a cure.  In the past many people with CF  at a very young age.  Today, many are living into their 20’s and 30’s.    IS THERE A CHANCE MY BABY COULD HAVE CYSTIC FIBROSIS?  · You can have a child with CF even if there is no history in your family (see chart below).  · CF testing can help determine if you are a carrier and at risk to have a child with CF.  Note: if both parents are carriers, there is a 1 in 4 (25%) chance with each pregnancy that they will have a child with CF.  · Carriers have one normal CF gene and one altered CF gene.  · People with CF have two altered CF genes.  · Most people have two normal copies of the CF gene.    Approximate risk that a couple with no family history of cystic fibrosis will have a child with cystic fibrosis:    Ethnic background / Risk     couple:  1 in 2,500   couple:  1 in 15,000            couple:  1 in 8,000     American couple:  1 in 32,000     WHAT TESTING IS AVAILABLE?  · There is a blood test that can be done to find out if you or your partner is a carrier.  · It is important to understand that CF carrier testing does not detect all CF carriers.  · If the test shows that you are both CF carriers, you unborn baby can  be tested to find out if the baby has CF.    HOW MUCH DOES IT COST TO HAVE CYSTIC FIBROSIS CARRIER TESTING?  · Cost and insurance coverage for CF carrier testing vary depending upon the laboratory used and your insurance policy.  · The average cost for CF carrier testing is $300 per person.  · Your genetic counselor can provide you with more information about cystic fibrosis carrier testing.    _____  Yes, I am interested in discussing carrier testing with a genetic counselor.    _____  No, I am not interested in CF carrier testing or in receiving more information about CF carrier testing.      Client signature: ________________________________________  2/25/2020

## 2020-02-25 NOTE — PROGRESS NOTES
S:  Ronit Tripathi is a 24 y.o.  who presents for her new OB exam.  She is 13w1d with and BLAYNE of Estimated Date of Delivery: 20 based off of US . She has no complaints.  She is currently not working. Discussed heavy lifting and chemical exposure. Had ER visits for bleeding, US was done. Previa and MARNIE noted. Continues to spot. Pelvic rest reinforced. No other previous care in this pregnancy.     Desires AFP.  Declines CF.  Denies VB, LOF, or cramping.  Denies dysuria, vaginal DC. Reports some fetal movement.     Pt is single and lives with kids.  Pregnancy is planned and desired, but FOB is not currently involved.  She has a history of 4 full term 's without any pregnancy complications. Stated she had a postpartum hemorrhage with last delivery, no blood needed, but did need extra medication to stop bleeding.  She has a history of 8 TAB's and 4 early first trimester SAB's. Denies any complications with any of them.      Discussed diet and exercise during pregnancy. Encouraged good nutrition, and daily exercise including walking or swimming. Discussed expected weight gain during pregnancy. Discussed adequate hydration during pregnancy.    Past Medical History:   Diagnosis Date   • Anemia    • Blood transfusion without reported diagnosis        • Chalazion of right upper eyelid 3/7/2017   • Hypertension    • Nausea and vomiting in pregnancy     ,   • Pregnant      Family History   Problem Relation Age of Onset   • No Known Problems Mother    • No Known Problems Father    • No Known Problems Sister    • No Known Problems Brother      Social History     Socioeconomic History   • Marital status: Single     Spouse name: Not on file   • Number of children: Not on file   • Years of education: Not on file   • Highest education level: Not on file   Occupational History   • Not on file   Social Needs   • Financial resource strain: Not on file   • Food insecurity     Worry: Not on file      Inability: Not on file   • Transportation needs     Medical: Not on file     Non-medical: Not on file   Tobacco Use   • Smoking status: Former Smoker     Packs/day: 0.00     Years: 1.00     Pack years: 0.00   • Smokeless tobacco: Never Used   • Tobacco comment: Stopped smoking once she found out she was pregnant   Substance and Sexual Activity   • Alcohol use: No   • Drug use: Not Currently     Types: Marijuana   • Sexual activity: Not Currently     Partners: Male     Comment: None    Lifestyle   • Physical activity     Days per week: Not on file     Minutes per session: Not on file   • Stress: Not on file   Relationships   • Social connections     Talks on phone: Not on file     Gets together: Not on file     Attends Cheondoism service: Not on file     Active member of club or organization: Not on file     Attends meetings of clubs or organizations: Not on file     Relationship status: Not on file   • Intimate partner violence     Fear of current or ex partner: Not on file     Emotionally abused: Not on file     Physically abused: Not on file     Forced sexual activity: Not on file   Other Topics Concern   • Not on file   Social History Narrative    ** Merged History Encounter **          OB History    Para Term  AB Living   17 4 4 0 12 4   SAB TAB Ectopic Molar Multiple Live Births   4 5 0     4      # Outcome Date GA Lbr Ke/2nd Weight Sex Delivery Anes PTL Lv   17 Current            16 2019           15 2019           14 2019           13 2019           12 TAB 2019           11 Term 17 38w1d  2.955 kg (6 lb 8.2 oz) F Vag-Spont  N PASCUAL      Birth Comments: hemorraghe after delivery   10 Term 04/07/15 40w0d  3.062 kg (6 lb 12 oz) M Vag-Spont None Y PASCUAL      Birth Comments: Pt states no complications   9 TAB 2014     TAB         Birth Comments: NO COMPLICATIONS   8 AB 2013 6w0d             Birth Comments: Pt states no complications   7 TAB 2012     TAB         Birth Comments:  "NO COMPLICATIONS   6 Term 12 39w5d  2.92 kg (6 lb 7 oz) F Vag-Spont EPI N PASCUAL      Birth Comments: Pt states no complications   5 AB  8w0d             Birth Comments: Pt. states wasnt ready for a baby.   4 Term 12/28/10 38w3d  2.693 kg (5 lb 15 oz) F Vag-Spont EPI Y PASCUAL      Birth Comments: Pt. states no complications.   3 AB  8w0d             Birth Comments: Pt. states couldnt afford the baby so decided to have    2 TAB  6w0d             Birth Comments: no compl, some regret, feels mother forced her.     1 TAB  6w0d             Birth Comments: no compl, still feels some regret, feels forced by mother.       History of Varicella Virus: yes  History of HSV I or II in self or partner: no  History of Thyroid problems: no    O:  /78   Ht 1.676 m (5' 6\")   Wt 76.7 kg (169 lb)    See Prenatal Physical.    Wet mount: deferred, no s/sx  Chaperone offered: yes      A:   1.  IUP @ 13w1d per US        2.  S=D        3.  See problem list below        4.  Placenta previa        5.  Subchorionic hemorrhage       Patient Active Problem List    Diagnosis Date Noted   • History of postpartum hemorrhage, currently pregnant 2020   • History of multiple miscarriages 2020   • Placenta previa partialis, antepartum 2020   • Subchorionic hemorrhage of placenta, antepartum 2020   • Supervision of normal pregnancy 10/14/2014         P:  1.  GC/CT & pap done        2.  Prenatal labs ordered - lab slip given        3.  Discussed PNV, diet, avoidances and adequate water intake        4.  NOB packet given        5.  Return to office in 4 wks        6.  Complete OB US in 6-7 wks        7.  AFP next visit        8.  Bleeding/previa precautions reviewed    Orders Placed This Encounter   • US-OB 2ND 3RD TRI COMPLETE   • PREG CNTR PRENATAL PN   • THINPREP RFLX HPV ASCUS W/CTNG   • URINE DRUG SCREEN W/CONF (AR)       HPI    Review of Systems   Constitutional: Negative.    HENT: Negative.  " "  Eyes: Negative.    Respiratory: Negative.    Cardiovascular: Negative.    Gastrointestinal: Negative.    Genitourinary: Negative.    Musculoskeletal: Negative.    Skin: Negative.    Neurological: Negative.    Endo/Heme/Allergies: Negative.    Psychiatric/Behavioral: Negative.    All other systems reviewed and are negative.         Objective:     /78   Ht 1.676 m (5' 6\")   Wt 76.7 kg (169 lb)   LMP 12/01/2019 (Exact Date)   BMI 27.28 kg/m²      Physical Exam  Vitals signs and nursing note reviewed. Exam conducted with a chaperone present.   Constitutional:       Appearance: Normal appearance.   HENT:      Head: Normocephalic.      Nose: Nose normal.   Eyes:      Conjunctiva/sclera: Conjunctivae normal.   Neck:      Musculoskeletal: Normal range of motion and neck supple.   Cardiovascular:      Rate and Rhythm: Normal rate and regular rhythm.      Pulses: Normal pulses.      Heart sounds: Normal heart sounds.   Pulmonary:      Effort: Pulmonary effort is normal.      Breath sounds: Normal breath sounds.   Abdominal:      General: Bowel sounds are normal.   Genitourinary:     General: Normal vulva.   Musculoskeletal: Normal range of motion.   Skin:     General: Skin is warm and dry.   Neurological:      General: No focal deficit present.      Mental Status: She is alert and oriented to person, place, and time.   Psychiatric:         Mood and Affect: Mood normal.         Behavior: Behavior normal.         Thought Content: Thought content normal.         Judgment: Judgment normal.          Assessment/Plan:       1. Encounter for supervision of other normal pregnancy in third trimester  BLAYNE 8/31/20 per US  - PREG CNTR PRENATAL PN; Future  - THINPREP RFLX HPV ASCUS W/CTNG; Future  - URINE DRUG SCREEN W/CONF (AR); Future  - US-OB 2ND 3RD TRI COMPLETE; Future    2. History of postpartum hemorrhage, currently pregnant    3. History of multiple miscarriages    4. Placenta previa partialis, antepartum    5. " Subchorionic hemorrhage of placenta, antepartum

## 2020-02-26 LAB
C TRACH DNA GENITAL QL NAA+PROBE: NEGATIVE
CYTOLOGY REG CYTOL: NORMAL
N GONORRHOEA DNA GENITAL QL NAA+PROBE: NEGATIVE
SPECIMEN SOURCE: NORMAL

## 2020-03-24 ENCOUNTER — ROUTINE PRENATAL (OUTPATIENT)
Dept: OBGYN | Facility: CLINIC | Age: 25
End: 2020-03-24
Payer: MEDICAID

## 2020-03-24 VITALS — BODY MASS INDEX: 27.47 KG/M2 | DIASTOLIC BLOOD PRESSURE: 62 MMHG | SYSTOLIC BLOOD PRESSURE: 108 MMHG | WEIGHT: 170.2 LBS

## 2020-03-24 DIAGNOSIS — O09.92 ENCOUNTER FOR SUPERVISION OF HIGH RISK PREGNANCY IN SECOND TRIMESTER, ANTEPARTUM: Primary | ICD-10-CM

## 2020-03-24 PROCEDURE — 90040 PR PRENATAL FOLLOW UP: CPT | Performed by: NURSE PRACTITIONER

## 2020-03-24 ASSESSMENT — PATIENT HEALTH QUESTIONNAIRE - PHQ9: CLINICAL INTERPRETATION OF PHQ2 SCORE: 0

## 2020-03-24 ASSESSMENT — FIBROSIS 4 INDEX: FIB4 SCORE: 0.75

## 2020-03-24 NOTE — PROGRESS NOTES
S: Pt is  at 17w1d here for routine OB follow up.  Reports abdominal tenderness. She would like a letter for previa precautions, states her  does npot believe that she cannot have sex at present. Letter provided. She has not gotten her pnp drawn, will do with AFP. Reports positive FM.  Denies VB, LOF,  RUCs or vaginal DC.     O:  Please see above vitals        FHTs: 150        Fundal ht: 17 cm         Prenatal labs: not done        GCCT: neg        Pap smear: neg    A: IUP 17w1d  Patient Active Problem List    Diagnosis Date Noted   • History of postpartum hemorrhage, currently pregnant 2020   • History of multiple miscarriages 2020   • Placenta previa partialis, antepartum 2020   • Subchorionic hemorrhage of placenta, antepartum 2020   • Supervision of normal pregnancy 10/14/2014       P:  1.  Reviewed labs w pt.        2.  Desires AFP, lab ordered.        3.  US is scheduled.        4.  Questions answered.        5.  Encouraged adequate water intake.        6.  F/u 4 wks.        7.  Previa precautions reviewed.

## 2020-03-24 NOTE — PROGRESS NOTES
OB follow up   + fetal movement.  No VB, LOF or UC's.  Wt: 170.2lb      BP:  Phone # [phone]  Preferred pharmacy confirmed.  PNP not done yet. Orders re-printed and given to patient.  AFP pended.  US on 2/21/20=partial previa. Provider to discuss precautions

## 2020-03-24 NOTE — LETTER
March 24, 2020            Ronit Tripathi is currently being cared for at [facility].  This patient is pregnant and has been diagnosed with partial placenta previa.  She should not lift greater than 10 pounds and and has been advised to not engage in any sexual activity, including nipple stimulation    Thank you,          MARIN Bishop.

## 2020-04-07 ENCOUNTER — APPOINTMENT (OUTPATIENT)
Dept: RADIOLOGY | Facility: IMAGING CENTER | Age: 25
End: 2020-04-07
Attending: NURSE PRACTITIONER
Payer: MEDICAID

## 2020-04-07 DIAGNOSIS — Z34.83 ENCOUNTER FOR SUPERVISION OF OTHER NORMAL PREGNANCY IN THIRD TRIMESTER: ICD-10-CM

## 2020-04-07 PROCEDURE — 76805 OB US >/= 14 WKS SNGL FETUS: CPT | Mod: TC | Performed by: OBSTETRICS & GYNECOLOGY

## 2020-04-08 PROBLEM — O35.BXX0 ECHOGENIC FOCUS OF HEART OF FETUS AFFECTING ANTEPARTUM CARE OF MOTHER: Status: ACTIVE | Noted: 2020-04-08

## 2020-04-09 ENCOUNTER — TELEPHONE (OUTPATIENT)
Dept: OBGYN | Facility: CLINIC | Age: 25
End: 2020-04-09

## 2020-04-09 NOTE — TELEPHONE ENCOUNTER
Pt notified about US. Gave her the normal pregnancy precautions. I told pt she needs to get her AFP done before her next appt and within the time frames. Pt will get AFP done next week. Pt understood and no further questions.     ----- Message from VERONICA Jarrell sent at 4/8/2020  8:27 AM PDT -----  Call pt to get AFP done. Also let her know her placenta is now anterior so ok to have sex, lift up to 20 pounds normal pregnancy precautions.

## 2020-04-20 ENCOUNTER — HOSPITAL ENCOUNTER (OUTPATIENT)
Dept: LAB | Facility: MEDICAL CENTER | Age: 25
End: 2020-04-20
Attending: NURSE PRACTITIONER
Payer: MEDICAID

## 2020-04-20 DIAGNOSIS — O09.92 ENCOUNTER FOR SUPERVISION OF HIGH RISK PREGNANCY IN SECOND TRIMESTER, ANTEPARTUM: ICD-10-CM

## 2020-04-20 PROCEDURE — 36415 COLL VENOUS BLD VENIPUNCTURE: CPT

## 2020-04-20 PROCEDURE — 81511 FTL CGEN ABNOR FOUR ANAL: CPT

## 2020-04-21 ENCOUNTER — ROUTINE PRENATAL (OUTPATIENT)
Dept: OBGYN | Facility: CLINIC | Age: 25
End: 2020-04-21
Payer: MEDICAID

## 2020-04-21 ENCOUNTER — HOSPITAL ENCOUNTER (OUTPATIENT)
Facility: MEDICAL CENTER | Age: 25
End: 2020-04-21
Attending: NURSE PRACTITIONER
Payer: MEDICAID

## 2020-04-21 VITALS — DIASTOLIC BLOOD PRESSURE: 62 MMHG | WEIGHT: 172 LBS | SYSTOLIC BLOOD PRESSURE: 110 MMHG | BODY MASS INDEX: 27.76 KG/M2

## 2020-04-21 DIAGNOSIS — R10.2 VAGINAL PAIN: ICD-10-CM

## 2020-04-21 DIAGNOSIS — Z34.82 ENCOUNTER FOR SUPERVISION OF OTHER NORMAL PREGNANCY IN SECOND TRIMESTER: ICD-10-CM

## 2020-04-21 DIAGNOSIS — Z34.82 ENCOUNTER FOR SUPERVISION OF OTHER NORMAL PREGNANCY IN SECOND TRIMESTER: Primary | ICD-10-CM

## 2020-04-21 LAB
APPEARANCE UR: CLEAR
BILIRUB UR STRIP-MCNC: NORMAL MG/DL
CANDIDA DNA VAG QL PROBE+SIG AMP: NEGATIVE
COLOR UR AUTO: NORMAL
G VAGINALIS DNA VAG QL PROBE+SIG AMP: POSITIVE
GLUCOSE UR STRIP.AUTO-MCNC: NEGATIVE MG/DL
KETONES UR STRIP.AUTO-MCNC: NEGATIVE MG/DL
LEUKOCYTE ESTERASE UR QL STRIP.AUTO: NORMAL
NITRITE UR QL STRIP.AUTO: NEGATIVE
PH UR STRIP.AUTO: 7 [PH] (ref 5–8)
PROT UR QL STRIP: NORMAL MG/DL
RBC UR QL AUTO: NEGATIVE
SP GR UR STRIP.AUTO: 1.02
T VAGINALIS DNA VAG QL PROBE+SIG AMP: NEGATIVE
UROBILINOGEN UR STRIP-MCNC: NORMAL MG/DL

## 2020-04-21 PROCEDURE — 87480 CANDIDA DNA DIR PROBE: CPT

## 2020-04-21 PROCEDURE — 87510 GARDNER VAG DNA DIR PROBE: CPT

## 2020-04-21 PROCEDURE — 87660 TRICHOMONAS VAGIN DIR PROBE: CPT

## 2020-04-21 PROCEDURE — 90040 PR PRENATAL FOLLOW UP: CPT | Performed by: NURSE PRACTITIONER

## 2020-04-21 PROCEDURE — 81002 URINALYSIS NONAUTO W/O SCOPE: CPT | Performed by: NURSE PRACTITIONER

## 2020-04-21 ASSESSMENT — FIBROSIS 4 INDEX: FIB4 SCORE: 0.75

## 2020-04-21 NOTE — PROGRESS NOTES
S:  Pt is  at 21w1d here for routine OB follow up.  Reports abnormal vaginal DC and a lot of pain with intercourse.  Tearful today, says her partner requires it twice daily.  Also reports abdominal pain.  Reports good FM.  Denies VB, LOF, RUCs, or vaginal DC.     O:  Please see above vitals. T: 99.6 temporally        FHTs: 140        Fundal ht: 21 cm.        AFP: pending -- reviewed w pt.        SSE: neg pooling, neg ferning, neg nitrazine.        SVE: Cl/50/floating. No VB noted.  No CMT with bimanual exam.        UA: WNL        ABD: very tender on right lower quadrant even with listening to FHTs    Complete OB US     2020 9:03 AM     HISTORY/REASON FOR EXAM:  Evaluate fetal anatomy     TECHNIQUE/EXAM DESCRIPTION: OB complete ultrasound.     COMPARISON:  None     FINDINGS:  Fetal Lie:  Transverse  LMP:  2019  Clinical BLAYNE by LMP:  2020     Placenta (Location):  Anterior  Placenta Previa: No  Placental Grade: I     Amniotic Fluid Volume:  MAR = 14.91 cm     Fetal Heart Rate:  131 bpm     Cervical Length:  3.22 cm transabdominal     No maternal adnexal mass is identified.     Umbilical Artery S/D Ratio(s):  Not applicable     Fetal Anatomy  (Seen or Not Seen)  Lateral Ventricles     Seen  Cisterna Magna        Seen  Cerebellum              Seen  CSP             Seen  Orbits             Seen  Face/Lips                Seen  Cord Insertion         Seen  Placental CI         Seen  4 Chamber Heart     Seen  LVOT               Seen  RVOT              Seen  3 Vessel View     Seen  Stomach       Seen  Kidneys                   Seen  Urinary Bladder      Seen  Spine                       Seen  3 Vessel Cord          Seen  Both Upper Extremities    Seen  Both Lower Extremities    Seen  Diaphragm             Seen  Movement       Seen  Gender:     Fetal Biometry  BPD    4.56 cm, 19 weeks, 5 days  HC    17.04 cm, 19 weeks, 5 days  AC    14.34 cm, 19 weeks, 5 days  Femur Length    2.97 cm, 19 weeks, 1  day  Humerus Length    2.78 cm, 18 weeks, 6 days  Cerebellum Diameter   2.0 cm     EGA by this US:  19 weeks, 3 days  BLAYNE by this US: 8/29/2020  Working BLAYNE:  8/31/2020     Estimated Fetal Weight:  294 grams  EFW Percentile: 63.4%     Comments:  Exam limited by fetal position and patient lack of cooperation. There is a potential nonspecific echogenic focus in the left ventricle of the fetal heart.     IMPRESSION:     1.  Single intrauterine pregnancy of an estimated gestational age of 19 weeks, 3 days with an estimated date of delivery of 8/29/2020.  2.  There is a potential nonspecific echogenic focus in the left ventricle of the fetal heart.  3.  Fetal survey is otherwise within normal limits.    A:  IUP 21w1d  Patient Active Problem List    Diagnosis Date Noted   • Echogenic focus of heart of fetus affecting antepartum care of mother 04/08/2020   • History of postpartum hemorrhage, currently pregnant 02/25/2020   • History of multiple miscarriages 02/25/2020   • Supervision of normal pregnancy 10/14/2014        P:  1.  Reviewed labs, ultrasound w pt.  Encouraged pt to complete PNP asap.        2.  To ER per Dr. Weaver. Report called to Dr. Colbert.        3.  Vaginal pathogen swab done.    Chaperone offered and provided by Cira Chairez MA.    Consulted w Dr. Weaver regarding pt and POC.

## 2020-04-21 NOTE — PROGRESS NOTES
Pt here today for OB follow up  Reports +FM  WT: 172 lb  BP: 110/62  Pt states she is having pain with intercourse and white color discharge with odor x 2 days. States no other complaints.   US on 04/07/20  Pt states she had her PNP labs done yesterday.   [place] # [phone]

## 2020-04-23 LAB
# FETUSES US: NORMAL
AFP MOM SERPL: 1.81
AFP SERPL-MCNC: 110 NG/ML
AGE - REPORTED: 27.3 YR
CURRENT SMOKER: NO
FAMILY MEMBER DISEASES HX: NO
GA METHOD: NORMAL
GA: NORMAL WK
HCG MOM SERPL: 0.41
HCG SERPL-ACNC: 7502 IU/L
HX OF HEREDITARY DISORDERS: NO
IDDM PATIENT QL: NO
INHIBIN A MOM SERPL: 0.96
INHIBIN A SERPL-MCNC: 162 PG/ML
INTEGRATED SCN PATIENT-IMP: NORMAL
PATHOLOGY STUDY: NORMAL
SPECIMEN DRAWN SERPL: NORMAL
U ESTRIOL MOM SERPL: 0.82
U ESTRIOL SERPL-MCNC: 2.2 NG/ML

## 2020-04-23 RX ORDER — METRONIDAZOLE 500 MG/1
500 TABLET ORAL 2 TIMES DAILY
Qty: 14 TAB | Refills: 0 | Status: ON HOLD | OUTPATIENT
Start: 2020-04-23 | End: 2020-08-19

## 2020-06-01 ENCOUNTER — ROUTINE PRENATAL (OUTPATIENT)
Dept: OBGYN | Facility: CLINIC | Age: 25
End: 2020-06-01
Payer: MEDICAID

## 2020-06-01 VITALS — SYSTOLIC BLOOD PRESSURE: 118 MMHG | BODY MASS INDEX: 29.38 KG/M2 | DIASTOLIC BLOOD PRESSURE: 70 MMHG | WEIGHT: 182 LBS

## 2020-06-01 DIAGNOSIS — Z34.82 ENCOUNTER FOR SUPERVISION OF OTHER NORMAL PREGNANCY IN SECOND TRIMESTER: Primary | ICD-10-CM

## 2020-06-01 PROCEDURE — 90040 PR PRENATAL FOLLOW UP: CPT | Performed by: NURSE PRACTITIONER

## 2020-06-01 PROCEDURE — 90471 IMMUNIZATION ADMIN: CPT | Performed by: NURSE PRACTITIONER

## 2020-06-01 PROCEDURE — 90715 TDAP VACCINE 7 YRS/> IM: CPT | Performed by: NURSE PRACTITIONER

## 2020-06-01 ASSESSMENT — FIBROSIS 4 INDEX: FIB4 SCORE: 0.78

## 2020-06-01 NOTE — NON-PROVIDER
TDap given to patient. R  Deltoid. Screening checklist reviewed with patient. VIS given. Verified by ROSE

## 2020-06-01 NOTE — PROGRESS NOTES
OB follow up   + fetal movement.  No VB, LOF or UC's.  Phone # [phone]  Preferred pharmacy confirmed.  Kick count sheet given today.  TDap toda  BTL signed  3rd trimester lab orders pended and instructions given to patient

## 2020-06-01 NOTE — PROGRESS NOTES
S:  Pt is  at 27w0d for routine OB follow up.  No concerns today.  No ED or hospital visits since last seen. Reports good FM.  Denies VB, LOF, RUCs or vaginal DC. Has not gotten labs drawn    O:  Please see above vitals.        FHTs: 145        Fundal ht: 28 cm.        S=D            A:  IUP at 27w0d  Patient Active Problem List    Diagnosis Date Noted   • Echogenic focus of heart of fetus affecting antepartum care of mother 2020   • History of postpartum hemorrhage, currently pregnant 2020   • History of multiple miscarriages 2020   • Supervision of normal pregnancy 10/14/2014        P:  1.  Desires BTL.          2.  Instructions given on FKCs.          3.  Questions answered.          4.  Encouraged pt to tour L&D.          5.  Encourage adequate water intake.        6.   labor precautions reviewed.         7.  F/u 2 wks.        8.  TDap today.        9.  Will have all labs drawn including 1 hr GTT ordered today

## 2020-06-01 NOTE — LETTER
"Count Your Baby's Movements  Another step to a healthy delivery    Ronit Tripathi             Dept: [phone]    How Many Weeks Pregnant=27w0d    Date to Begin Countin2020              How to use this chart    One way for your physician to keep track of your baby's health is by knowing how often the baby moves (or \"kicks\") in your womb.  You can help your physician to do this by using this chart every day.    Every day, you should see how many hours it takes for your baby to move 10 times.  Start in the morning, as soon as you get up.    · First, write down the time your baby moves until you get to 10.  · Check off one box every time your baby moves until you get to 10.  · Write down the time you finished counting in the last column.  · Total how long it took to count up all 10 movements.  · Finally, fill in the box that shows how long this took.  After counting 10 movements, you no longer have to count any more that day.  The next morning, just start counting again as soon as you get up.    What should you call a \"movement\"?  It is hard to say, because it will feel different from one mother to another and from one pregnancy to the next.  The important thing is that you count the movements the same way throughout your pregnancy.  If you have more questions, you should ask your physician.    Count carefully every day!  SAMPLE:  Week 28    How many hours did it take to feel 10 movements?       Start  Time     1     2     3     4     5     6     7     8     9     10   Finish Time   Mon 8:20 ·  ·  ·  ·  ·  ·  ·  ·  ·  ·  11:40                  Sat               Sun                 IMPORTANT: You should contact your physician if it takes more than two hours for you to feel 10 movements.  Each morning, write down the time and start to count the movements of your baby.  Keep track by checking off one box every time you feel one movement.  When you have felt " "10 \"kicks\", write down the time you finished counting in the last column.  Then fill in the   box (over the check li) for the number of hours it took.  Be sure to read the complete instructions on the previous page.            "

## 2020-06-18 ENCOUNTER — HOSPITAL ENCOUNTER (OUTPATIENT)
Dept: LAB | Facility: MEDICAL CENTER | Age: 25
End: 2020-06-18
Attending: NURSE PRACTITIONER
Payer: MEDICAID

## 2020-06-18 DIAGNOSIS — Z34.82 ENCOUNTER FOR SUPERVISION OF OTHER NORMAL PREGNANCY IN SECOND TRIMESTER: ICD-10-CM

## 2020-06-18 DIAGNOSIS — Z34.83 ENCOUNTER FOR SUPERVISION OF OTHER NORMAL PREGNANCY IN THIRD TRIMESTER: ICD-10-CM

## 2020-06-18 LAB
ABO GROUP BLD: NORMAL
APPEARANCE UR: ABNORMAL
BACTERIA #/AREA URNS HPF: ABNORMAL /HPF
BASOPHILS # BLD AUTO: 0.5 % (ref 0–1.8)
BASOPHILS # BLD: 0.06 K/UL (ref 0–0.12)
BILIRUB UR QL STRIP.AUTO: NEGATIVE
BLD GP AB SCN SERPL QL: NORMAL
COLOR UR: YELLOW
EOSINOPHIL # BLD AUTO: 0.12 K/UL (ref 0–0.51)
EOSINOPHIL NFR BLD: 1.1 % (ref 0–6.9)
EPI CELLS #/AREA URNS HPF: ABNORMAL /HPF
ERYTHROCYTE [DISTWIDTH] IN BLOOD BY AUTOMATED COUNT: 36.2 FL (ref 35.9–50)
GLUCOSE 1H P 50 G GLC PO SERPL-MCNC: 112 MG/DL (ref 70–139)
GLUCOSE UR STRIP.AUTO-MCNC: NEGATIVE MG/DL
HBV SURFACE AG SER QL: ABNORMAL
HCT VFR BLD AUTO: 37.2 % (ref 37–47)
HCV AB SER QL: NORMAL
HGB BLD-MCNC: 12.3 G/DL (ref 12–16)
HIV 1+2 AB+HIV1 P24 AG SERPL QL IA: NORMAL
HYALINE CASTS #/AREA URNS LPF: ABNORMAL /LPF
IMM GRANULOCYTES # BLD AUTO: 0.11 K/UL (ref 0–0.11)
IMM GRANULOCYTES NFR BLD AUTO: 1 % (ref 0–0.9)
KETONES UR STRIP.AUTO-MCNC: NEGATIVE MG/DL
LEUKOCYTE ESTERASE UR QL STRIP.AUTO: ABNORMAL
LYMPHOCYTES # BLD AUTO: 2.55 K/UL (ref 1–4.8)
LYMPHOCYTES NFR BLD: 22.8 % (ref 22–41)
MCH RBC QN AUTO: 27.2 PG (ref 27–33)
MCHC RBC AUTO-ENTMCNC: 33.1 G/DL (ref 33.6–35)
MCV RBC AUTO: 82.1 FL (ref 81.4–97.8)
MICRO URNS: ABNORMAL
MONOCYTES # BLD AUTO: 0.66 K/UL (ref 0–0.85)
MONOCYTES NFR BLD AUTO: 5.9 % (ref 0–13.4)
NEUTROPHILS # BLD AUTO: 7.66 K/UL (ref 2–7.15)
NEUTROPHILS NFR BLD: 68.7 % (ref 44–72)
NITRITE UR QL STRIP.AUTO: NEGATIVE
NRBC # BLD AUTO: 0 K/UL
NRBC BLD-RTO: 0 /100 WBC
PH UR STRIP.AUTO: 7.5 [PH] (ref 5–8)
PLATELET # BLD AUTO: 248 K/UL (ref 164–446)
PMV BLD AUTO: 11.2 FL (ref 9–12.9)
PROT UR QL STRIP: NEGATIVE MG/DL
RBC # BLD AUTO: 4.53 M/UL (ref 4.2–5.4)
RBC # URNS HPF: ABNORMAL /HPF
RBC UR QL AUTO: NEGATIVE
RH BLD: NORMAL
RUBV AB SER QL: 88.8 IU/ML
SP GR UR STRIP.AUTO: 1.01
TREPONEMA PALLIDUM IGG+IGM AB [PRESENCE] IN SERUM OR PLASMA BY IMMUNOASSAY: ABNORMAL
UROBILINOGEN UR STRIP.AUTO-MCNC: 0.2 MG/DL
WBC # BLD AUTO: 11.2 K/UL (ref 4.8–10.8)
WBC #/AREA URNS HPF: ABNORMAL /HPF

## 2020-06-18 PROCEDURE — 86803 HEPATITIS C AB TEST: CPT

## 2020-06-18 PROCEDURE — 86850 RBC ANTIBODY SCREEN: CPT

## 2020-06-18 PROCEDURE — 81001 URINALYSIS AUTO W/SCOPE: CPT | Mod: XU

## 2020-06-18 PROCEDURE — 86780 TREPONEMA PALLIDUM: CPT

## 2020-06-18 PROCEDURE — 86901 BLOOD TYPING SEROLOGIC RH(D): CPT

## 2020-06-18 PROCEDURE — 82950 GLUCOSE TEST: CPT

## 2020-06-18 PROCEDURE — 87086 URINE CULTURE/COLONY COUNT: CPT

## 2020-06-18 PROCEDURE — 80307 DRUG TEST PRSMV CHEM ANLYZR: CPT

## 2020-06-18 PROCEDURE — 86762 RUBELLA ANTIBODY: CPT

## 2020-06-18 PROCEDURE — 86900 BLOOD TYPING SEROLOGIC ABO: CPT

## 2020-06-18 PROCEDURE — 87340 HEPATITIS B SURFACE AG IA: CPT

## 2020-06-18 PROCEDURE — 36415 COLL VENOUS BLD VENIPUNCTURE: CPT

## 2020-06-18 PROCEDURE — 85025 COMPLETE CBC W/AUTO DIFF WBC: CPT

## 2020-06-18 PROCEDURE — 87389 HIV-1 AG W/HIV-1&-2 AB AG IA: CPT

## 2020-06-20 LAB
AMPHET CTO UR CFM-MCNC: NEGATIVE NG/ML
BARBITURATES CTO UR CFM-MCNC: NEGATIVE NG/ML
BENZODIAZ CTO UR CFM-MCNC: NEGATIVE NG/ML
CANNABINOIDS CTO UR CFM-MCNC: NEGATIVE NG/ML
COCAINE CTO UR CFM-MCNC: NEGATIVE NG/ML
DRUG COMMENT 753798: NORMAL
METHADONE CTO UR CFM-MCNC: NEGATIVE NG/ML
OPIATES CTO UR CFM-MCNC: NEGATIVE NG/ML
PCP CTO UR CFM-MCNC: NEGATIVE NG/ML
PROPOXYPH CTO UR CFM-MCNC: NEGATIVE NG/ML

## 2020-06-21 LAB
BACTERIA UR CULT: NORMAL
SIGNIFICANT IND 70042: NORMAL
SITE SITE: NORMAL
SOURCE SOURCE: NORMAL

## 2020-06-24 ENCOUNTER — ROUTINE PRENATAL (OUTPATIENT)
Dept: OBGYN | Facility: CLINIC | Age: 25
End: 2020-06-24
Payer: MEDICAID

## 2020-06-24 VITALS — DIASTOLIC BLOOD PRESSURE: 74 MMHG | WEIGHT: 178 LBS | SYSTOLIC BLOOD PRESSURE: 118 MMHG | BODY MASS INDEX: 28.73 KG/M2

## 2020-06-24 DIAGNOSIS — Z34.83 ENCOUNTER FOR SUPERVISION OF OTHER NORMAL PREGNANCY IN THIRD TRIMESTER: Primary | ICD-10-CM

## 2020-06-24 PROCEDURE — 90040 PR PRENATAL FOLLOW UP: CPT | Performed by: NURSE PRACTITIONER

## 2020-06-24 ASSESSMENT — FIBROSIS 4 INDEX: FIB4 SCORE: 0.69

## 2020-06-24 NOTE — PROGRESS NOTES
SUBJECTIVE:   Pt is a 25 y.o.  at 30w2d gestation. Presents today for follow-up prenatal care. Has not been seen in ER or L & D since last visit. Reports positive fetal movement.   Leaking of fluid? no   Dysuria? no   Headaches? no   N/V? no   Cramping/contractions? Notes occasional non painful contractions     OBJECTIVE:   /74  Wt 80.7 kg (178 lb)  LMP 2019 (Exact Date)  BMI 28.73 kg/m²   Patients' weight gain, fluid intake and exercise level discussed.   Vitals, fundal height , fetal position, and FHR reviewed on flowsheet     ASSESSMENT/ PLAN:   - IUP at 30w2d   - S = D   -        Patient Active Problem List    Diagnosis Date Noted   • Echogenic focus of heart of fetus affecting antepartum care of mother 2020   • History of postpartum hemorrhage, currently pregnant 2020   • History of multiple miscarriages 2020   • Supervision of normal pregnancy 10/14/2014   PP BCM: BTL consents signed at previous visit   - S/sx pregnancy and labor warning signs vs general discomforts discussed   - Fetal movements and kick counts reviewed. Adequate hydration reinforced.   - Did discuss current COVID policies related to outpatient and inpatient visits.   - Anticipatory guidance provided. Reviewed normalcy of her PNP and GTT.  - RTC in 2 weeks for routine prenatal care.

## 2020-06-24 NOTE — PROGRESS NOTES
OB follow up   + fetal movement.  No VB, LOF or UC's.  Wt:178       BP: 118/74  Phone # [phone]  Preferred pharmacy confirmed.  Labs, US, TDap, and REBECCA done

## 2020-07-09 ENCOUNTER — ROUTINE PRENATAL (OUTPATIENT)
Dept: OBGYN | Facility: CLINIC | Age: 25
End: 2020-07-09
Payer: MEDICAID

## 2020-07-09 VITALS — BODY MASS INDEX: 29.21 KG/M2 | SYSTOLIC BLOOD PRESSURE: 102 MMHG | WEIGHT: 181 LBS | DIASTOLIC BLOOD PRESSURE: 62 MMHG

## 2020-07-09 DIAGNOSIS — Z34.83 ENCOUNTER FOR SUPERVISION OF OTHER NORMAL PREGNANCY IN THIRD TRIMESTER: Primary | ICD-10-CM

## 2020-07-09 PROCEDURE — 90040 PR PRENATAL FOLLOW UP: CPT | Performed by: NURSE PRACTITIONER

## 2020-07-09 ASSESSMENT — FIBROSIS 4 INDEX: FIB4 SCORE: 0.69

## 2020-07-09 NOTE — PROGRESS NOTES
S) Pt is a 25 y.o.   at 32w3d  gestation. Routine prenatal care today. Without any concerns today.  Abdomen feeling very sensitive to touch.  Also had this with her last pregnancy.  No pain.   Fetal movement Normal  Cramping no  VB no  LOF no   Denies dysuria. Generally feels well today. Good self-care activities identified. Denies headaches, swelling, visual changes, or epigastric pain .     O) See flow sheet for vital signs and fetal measurements.          Labs:       PNL: WNL       GCT: 112       AFP: normal       GBS: N/A       Pertinent ultrasound - 20 EIF, otherwise WNL           A) IUP at 32w3d       S=D         Patient Active Problem List    Diagnosis Date Noted   • Echogenic focus of heart of fetus affecting antepartum care of mother 2020   • History of postpartum hemorrhage, currently pregnant 2020   • History of multiple miscarriages 2020   • Supervision of normal pregnancy 10/14/2014          SVE: deferred         TDAP: yes       FLU: no        BTL: no       : no       C/S Consent: no       IOL or C/S scheduled: no       LAST PAP: 20 negative         P) s/s ptl vs general discomforts. Fetal movements reviewed. General ed and anticipatory guidance. Nutrition/exercise/vitamin. Plans breast Plans pp contraception- unsure  Continue PNV.   RTC 2 weeks or PRN  Desires IOl at 39 weeks electively

## 2020-07-09 NOTE — NON-PROVIDER
OB follow up   + fetal movement.  No VB, LOF or UC's.  Wt: 181      BP: 102/62  Phone # [phone]  C/o having hip pain  Preferred pharmacy confirmed.

## 2020-07-29 ENCOUNTER — ROUTINE PRENATAL (OUTPATIENT)
Dept: OBGYN | Facility: CLINIC | Age: 25
End: 2020-07-29
Payer: MEDICAID

## 2020-07-29 VITALS — WEIGHT: 183 LBS | DIASTOLIC BLOOD PRESSURE: 70 MMHG | BODY MASS INDEX: 29.54 KG/M2 | SYSTOLIC BLOOD PRESSURE: 120 MMHG

## 2020-07-29 DIAGNOSIS — Z34.83 ENCOUNTER FOR SUPERVISION OF OTHER NORMAL PREGNANCY IN THIRD TRIMESTER: Primary | ICD-10-CM

## 2020-07-29 PROCEDURE — 90040 PR PRENATAL FOLLOW UP: CPT | Performed by: NURSE PRACTITIONER

## 2020-07-29 ASSESSMENT — FIBROSIS 4 INDEX: FIB4 SCORE: 0.69

## 2020-07-29 NOTE — NON-PROVIDER
OB follow up   + fetal movement.  No VB, LOF or UC's.  Wt:183       BP: 120/70  Phone # [phone]  C/o lots of cramping in the stomach and back, and vagina, and feet  Preferred pharmacy confirmed.

## 2020-07-29 NOTE — PROGRESS NOTES
S) Pt is a 25 y.o.   at 35w2d  gestation. Routine prenatal care today. Complains of pelvic and back pressure. States they just feel like jasper herbert and she knows that shes not actually in labor. We did review labor precautions. GBS next visit. All questions answered.    Fetal movement Normal  Cramping yes  VB no  LOF no   Denies dysuria. Generally feels well today. Good self-care activities identified. Denies headaches, swelling, visual changes, or epigastric pain .     O) /70   Wt 83 kg (183 lb)         Labs:       PNL: WNL       GCT: 112        AFP: normal       GBS: N/A       Pertinent ultrasound -        20- Survey WNL except for EIF, MAR 14.91cm, c/w prev dating. Previa resolved    A) IUP at 35w2d       S=D         Patient Active Problem List    Diagnosis Date Noted   • Echogenic focus of heart of fetus affecting antepartum care of mother 2020   • History of postpartum hemorrhage, currently pregnant 2020   • History of multiple miscarriages 2020   • Supervision of normal pregnancy 10/14/2014          SVE: deferred       Chaperone offered: n/a         TDAP: yes       FLU: no        BTL: yes       : n/a       C/S Consent: n/a       IOL or C/S scheduled: no       LAST PAP: 20- negative         P) s/s ptl vs general discomforts. Fetal movements reviewed. General ed and anticipatory guidance. Nutrition/exercise/vitamin. Plans breast Plans pp contraception- BTL.  Continue PNV.

## 2020-08-05 ENCOUNTER — ROUTINE PRENATAL (OUTPATIENT)
Dept: OBGYN | Facility: CLINIC | Age: 25
End: 2020-08-05
Payer: MEDICAID

## 2020-08-05 ENCOUNTER — HOSPITAL ENCOUNTER (OUTPATIENT)
Facility: MEDICAL CENTER | Age: 25
End: 2020-08-05
Attending: NURSE PRACTITIONER
Payer: MEDICAID

## 2020-08-05 VITALS — BODY MASS INDEX: 29.8 KG/M2 | SYSTOLIC BLOOD PRESSURE: 120 MMHG | WEIGHT: 184.6 LBS | DIASTOLIC BLOOD PRESSURE: 66 MMHG

## 2020-08-05 DIAGNOSIS — Z34.83 ENCOUNTER FOR SUPERVISION OF OTHER NORMAL PREGNANCY IN THIRD TRIMESTER: Primary | ICD-10-CM

## 2020-08-05 DIAGNOSIS — Z34.83 ENCOUNTER FOR SUPERVISION OF OTHER NORMAL PREGNANCY IN THIRD TRIMESTER: ICD-10-CM

## 2020-08-05 PROCEDURE — 87150 DNA/RNA AMPLIFIED PROBE: CPT

## 2020-08-05 PROCEDURE — 90040 PR PRENATAL FOLLOW UP: CPT | Performed by: NURSE PRACTITIONER

## 2020-08-05 PROCEDURE — 87081 CULTURE SCREEN ONLY: CPT

## 2020-08-05 ASSESSMENT — FIBROSIS 4 INDEX: FIB4 SCORE: 0.69

## 2020-08-05 NOTE — PROGRESS NOTES
OB follow up   + fetal movement.  No VB, LOF or UC's.   Sunday has a lot of contractions   Wt: 184.6 lbs      BP: 120/66  Phone # [phone]  Preferred pharmacy confirmed. yes

## 2020-08-06 NOTE — PROGRESS NOTES
S) Pt is a 25 y.o.   at 36w2d  gestation. Routine prenatal care today. Complains of some contractions that come and go. Discussed normalcy of this and labor precautions reviewed. GBS today. All questions answered.    Fetal movement Normal  Cramping yes  VB no  LOF no   Denies dysuria. Generally feels well today. Good self-care activities identified. Denies headaches, swelling, visual changes, or epigastric pain .     O) /66   Wt 83.7 kg (184 lb 9.6 oz)         Labs:       PNL: WNL       GCT: 112        AFP: normal       GBS: collected today       Pertinent ultrasound -        20- survey with EIF, remainder WNL, MAR 14.91cm, c/w prev dating. Previa resolved    A) IUP at 36w2d       S=D         Patient Active Problem List    Diagnosis Date Noted   • Echogenic focus of heart of fetus affecting antepartum care of mother 2020   • History of postpartum hemorrhage, currently pregnant 2020   • History of multiple miscarriages 2020   • Supervision of normal pregnancy 10/14/2014          SVE: 1-270/-2       Chaperone offered: yes         TDAP: yes       FLU: no        BTL: yes       : n/a       C/S Consent: n/a       IOL or C/S scheduled: no       LAST PAP: 20- negative         P) s/s ptl vs general discomforts. Fetal movements reviewed. General ed and anticipatory guidance. Nutrition/exercise/vitamin. Plans breast Plans pp contraception- BTL.  Continue PNV.

## 2020-08-07 LAB — GP B STREP DNA SPEC QL NAA+PROBE: NEGATIVE

## 2020-08-12 ENCOUNTER — ROUTINE PRENATAL (OUTPATIENT)
Dept: OBGYN | Facility: CLINIC | Age: 25
End: 2020-08-12
Payer: MEDICAID

## 2020-08-12 VITALS — DIASTOLIC BLOOD PRESSURE: 70 MMHG | BODY MASS INDEX: 30.02 KG/M2 | SYSTOLIC BLOOD PRESSURE: 124 MMHG | WEIGHT: 186 LBS

## 2020-08-12 DIAGNOSIS — Z34.83 ENCOUNTER FOR SUPERVISION OF OTHER NORMAL PREGNANCY IN THIRD TRIMESTER: ICD-10-CM

## 2020-08-12 PROCEDURE — 90040 PR PRENATAL FOLLOW UP: CPT | Performed by: OBSTETRICS & GYNECOLOGY

## 2020-08-12 ASSESSMENT — FIBROSIS 4 INDEX: FIB4 SCORE: 0.69

## 2020-08-12 NOTE — PROGRESS NOTES
"Pt here for OB f/u. Denies VB, LOF. Reports +FM and irregular UCs and \"lots of pressure\".  Good phone# [phone]  Pharmacy verified with pt.    "

## 2020-08-12 NOTE — PROGRESS NOTES
OB Followup;    37w2d    Patient Active Problem List    Diagnosis Date Noted   • Echogenic focus of heart of fetus affecting antepartum care of mother 2020   • History of postpartum hemorrhage, currently pregnant 2020   • History of multiple miscarriages 2020   • Supervision of normal pregnancy 10/14/2014       Vitals:    20 1310   BP: 124/70   Weight: 84.4 kg (186 lb)       Patient presents for followup of OB care. Currently doing well . Good fetal movement no leakage of fluid no contractions or vaginal bleeding        Size equals dates, normal fetal heart rate    Cervix-2 to 3 cm / 70 to 80% effaced/soft/posterior    Labs; GBS negative    Labor precautions given  Increase oral hydration  Discussed proper weight gain during pregnancy  Continue prenatal vitamins  Signs and symptoms of labor/ labor discussed   Discussed our group's policy on prenatal checkups and delivery    Followup in  1 weeks

## 2020-08-13 ENCOUNTER — TELEPHONE (OUTPATIENT)
Dept: OBGYN | Facility: CLINIC | Age: 25
End: 2020-08-13

## 2020-08-13 NOTE — TELEPHONE ENCOUNTER
Pt LM on VM c/o decrease FM.  Spoke with pt and states she has had decreased FM all day, adv pt to go to L&D for further evaluation

## 2020-08-19 ENCOUNTER — HOSPITAL ENCOUNTER (INPATIENT)
Facility: MEDICAL CENTER | Age: 25
LOS: 2 days | End: 2020-08-21
Attending: OBSTETRICS & GYNECOLOGY | Admitting: OBSTETRICS & GYNECOLOGY
Payer: MEDICAID

## 2020-08-19 ENCOUNTER — ROUTINE PRENATAL (OUTPATIENT)
Dept: OBGYN | Facility: CLINIC | Age: 25
End: 2020-08-19
Payer: MEDICAID

## 2020-08-19 VITALS — SYSTOLIC BLOOD PRESSURE: 122 MMHG | BODY MASS INDEX: 30.18 KG/M2 | DIASTOLIC BLOOD PRESSURE: 68 MMHG | WEIGHT: 187 LBS

## 2020-08-19 DIAGNOSIS — Z34.83 ENCOUNTER FOR SUPERVISION OF OTHER NORMAL PREGNANCY IN THIRD TRIMESTER: Primary | ICD-10-CM

## 2020-08-19 PROCEDURE — 85025 COMPLETE CBC W/AUTO DIFF WBC: CPT

## 2020-08-19 PROCEDURE — C9803 HOPD COVID-19 SPEC COLLECT: HCPCS | Performed by: OBSTETRICS & GYNECOLOGY

## 2020-08-19 PROCEDURE — 36415 COLL VENOUS BLD VENIPUNCTURE: CPT

## 2020-08-19 PROCEDURE — 770002 HCHG ROOM/CARE - OB PRIVATE (112)

## 2020-08-19 PROCEDURE — 90040 PR PRENATAL FOLLOW UP: CPT | Performed by: NURSE PRACTITIONER

## 2020-08-19 PROCEDURE — 700105 HCHG RX REV CODE 258: Performed by: ANESTHESIOLOGY

## 2020-08-19 RX ORDER — IBUPROFEN 600 MG/1
600 TABLET ORAL EVERY 6 HOURS PRN
Status: DISCONTINUED | OUTPATIENT
Start: 2020-08-19 | End: 2020-08-20

## 2020-08-19 RX ORDER — ONDANSETRON 2 MG/ML
4 INJECTION INTRAMUSCULAR; INTRAVENOUS EVERY 6 HOURS PRN
Status: DISCONTINUED | OUTPATIENT
Start: 2020-08-19 | End: 2020-08-21 | Stop reason: HOSPADM

## 2020-08-19 RX ORDER — ROPIVACAINE HYDROCHLORIDE 2 MG/ML
INJECTION, SOLUTION EPIDURAL; INFILTRATION; PERINEURAL
Status: ACTIVE
Start: 2020-08-19 | End: 2020-08-20

## 2020-08-19 RX ORDER — ALUMINA, MAGNESIA, AND SIMETHICONE 2400; 2400; 240 MG/30ML; MG/30ML; MG/30ML
30 SUSPENSION ORAL EVERY 6 HOURS PRN
Status: DISCONTINUED | OUTPATIENT
Start: 2020-08-19 | End: 2020-08-20 | Stop reason: HOSPADM

## 2020-08-19 RX ORDER — MISOPROSTOL 200 UG/1
800 TABLET ORAL
Status: DISCONTINUED | OUTPATIENT
Start: 2020-08-19 | End: 2020-08-20

## 2020-08-19 RX ORDER — SODIUM CHLORIDE, SODIUM LACTATE, POTASSIUM CHLORIDE, CALCIUM CHLORIDE 600; 310; 30; 20 MG/100ML; MG/100ML; MG/100ML; MG/100ML
INJECTION, SOLUTION INTRAVENOUS
Status: ACTIVE
Start: 2020-08-19 | End: 2020-08-20

## 2020-08-19 RX ORDER — METHYLERGONOVINE MALEATE 0.2 MG/ML
0.2 INJECTION INTRAVENOUS
Status: DISCONTINUED | OUTPATIENT
Start: 2020-08-19 | End: 2020-08-20 | Stop reason: HOSPADM

## 2020-08-19 RX ORDER — ONDANSETRON 4 MG/1
4 TABLET, ORALLY DISINTEGRATING ORAL EVERY 6 HOURS PRN
Status: DISCONTINUED | OUTPATIENT
Start: 2020-08-19 | End: 2020-08-21 | Stop reason: HOSPADM

## 2020-08-19 RX ORDER — SODIUM CHLORIDE, SODIUM LACTATE, POTASSIUM CHLORIDE, CALCIUM CHLORIDE 600; 310; 30; 20 MG/100ML; MG/100ML; MG/100ML; MG/100ML
INJECTION, SOLUTION INTRAVENOUS CONTINUOUS
Status: DISPENSED | OUTPATIENT
Start: 2020-08-19 | End: 2020-08-20

## 2020-08-19 RX ADMIN — SODIUM CHLORIDE, POTASSIUM CHLORIDE, SODIUM LACTATE AND CALCIUM CHLORIDE 1000 ML: 600; 310; 30; 20 INJECTION, SOLUTION INTRAVENOUS at 23:51

## 2020-08-19 ASSESSMENT — LIFESTYLE VARIABLES
ALCOHOL_USE: NO
EVER_SMOKED: YES

## 2020-08-19 ASSESSMENT — PATIENT HEALTH QUESTIONNAIRE - PHQ9
SUM OF ALL RESPONSES TO PHQ9 QUESTIONS 1 AND 2: 0
2. FEELING DOWN, DEPRESSED, IRRITABLE, OR HOPELESS: NOT AT ALL
1. LITTLE INTEREST OR PLEASURE IN DOING THINGS: NOT AT ALL

## 2020-08-19 ASSESSMENT — FIBROSIS 4 INDEX
FIB4 SCORE: 0.69
FIB4 SCORE: 0.69

## 2020-08-19 NOTE — PROGRESS NOTES
S) Pt is a 25 y.o.   at 38w2d  gestation. Routine prenatal care today. Complains of sporadic contractions and pelvic pressure. She is frustrated that they keep coming and then stopping. SVE and IOL referral today. Labor precautions reviewed, all questions answered.    Fetal movement Normal  Cramping yes- sporadic  VB no  LOF no   Denies dysuria. Generally feels well today. Good self-care activities identified. Denies headaches, swelling, visual changes, or epigastric pain .     O) /68   Wt 84.8 kg (187 lb)         Labs:       PNL: WNL       GCT: 112        AFP: normal       GBS: negative       Pertinent ultrasound -        20- Survey with EIF noted, remainder WNL, MAR 14.91cm, c/w prev dating. Previa resolved    A) IUP at 38w2d       S=D         Patient Active Problem List    Diagnosis Date Noted   • Echogenic focus of heart of fetus affecting antepartum care of mother 2020   • History of postpartum hemorrhage, currently pregnant 2020   • History of multiple miscarriages 2020   • Supervision of normal pregnancy 10/14/2014          SVE: 3/80/-2, BBOW. Membranes swept per patient request.       Chaperone offered: yes         TDAP: yes       FLU: no        BTL: yes       : n/a       C/S Consent: n/a       IOL or C/S scheduled: no       LAST PAP: 20- negative         P) s/s ptl vs general discomforts. Fetal movements reviewed. General ed and anticipatory guidance. Nutrition/exercise/vitamin. Plans breast Plans pp contraception- BTL.  Continue PNV.

## 2020-08-19 NOTE — PROGRESS NOTES
Pt here today for OB follow up  Negative GBS, pt informed today  Reports +FM  WT: 187 lb  BP: 122/68  Preferred pharmacy verified with pt.  Pt states she has been getting strong contractions and then they go away x 2 days. . Pt also reports having vaginal pressure and soreness.   Good # [phone]

## 2020-08-20 ENCOUNTER — ANESTHESIA EVENT (OUTPATIENT)
Dept: ANESTHESIOLOGY | Facility: MEDICAL CENTER | Age: 25
End: 2020-08-20
Payer: MEDICAID

## 2020-08-20 ENCOUNTER — ANESTHESIA (OUTPATIENT)
Dept: ANESTHESIOLOGY | Facility: MEDICAL CENTER | Age: 25
End: 2020-08-20
Payer: MEDICAID

## 2020-08-20 LAB
ABO GROUP BLD: NORMAL
BASOPHILS # BLD AUTO: 0.4 % (ref 0–1.8)
BASOPHILS # BLD: 0.06 K/UL (ref 0–0.12)
BLD GP AB SCN SERPL QL: NORMAL
COVID ORDER STATUS COVID19: NORMAL
EOSINOPHIL # BLD AUTO: 0.1 K/UL (ref 0–0.51)
EOSINOPHIL NFR BLD: 0.6 % (ref 0–6.9)
ERYTHROCYTE [DISTWIDTH] IN BLOOD BY AUTOMATED COUNT: 38.6 FL (ref 35.9–50)
ERYTHROCYTE [DISTWIDTH] IN BLOOD BY AUTOMATED COUNT: 39.5 FL (ref 35.9–50)
HCT VFR BLD AUTO: 30.5 % (ref 37–47)
HCT VFR BLD AUTO: 35.2 % (ref 37–47)
HGB BLD-MCNC: 11.3 G/DL (ref 12–16)
HGB BLD-MCNC: 9.9 G/DL (ref 12–16)
HOLDING TUBE BB 8507: NORMAL
IMM GRANULOCYTES # BLD AUTO: 0.17 K/UL (ref 0–0.11)
IMM GRANULOCYTES NFR BLD AUTO: 1 % (ref 0–0.9)
LYMPHOCYTES # BLD AUTO: 3.37 K/UL (ref 1–4.8)
LYMPHOCYTES NFR BLD: 20.4 % (ref 22–41)
MCH RBC QN AUTO: 24.8 PG (ref 27–33)
MCH RBC QN AUTO: 25.4 PG (ref 27–33)
MCHC RBC AUTO-ENTMCNC: 32.1 G/DL (ref 33.6–35)
MCHC RBC AUTO-ENTMCNC: 32.5 G/DL (ref 33.6–35)
MCV RBC AUTO: 77.4 FL (ref 81.4–97.8)
MCV RBC AUTO: 78.2 FL (ref 81.4–97.8)
MONOCYTES # BLD AUTO: 1.19 K/UL (ref 0–0.85)
MONOCYTES NFR BLD AUTO: 7.2 % (ref 0–13.4)
NEUTROPHILS # BLD AUTO: 11.66 K/UL (ref 2–7.15)
NEUTROPHILS NFR BLD: 70.4 % (ref 44–72)
NRBC # BLD AUTO: 0 K/UL
NRBC BLD-RTO: 0 /100 WBC
PLATELET # BLD AUTO: 226 K/UL (ref 164–446)
PLATELET # BLD AUTO: 251 K/UL (ref 164–446)
PMV BLD AUTO: 11.3 FL (ref 9–12.9)
PMV BLD AUTO: 11.5 FL (ref 9–12.9)
RBC # BLD AUTO: 3.9 M/UL (ref 4.2–5.4)
RBC # BLD AUTO: 4.55 M/UL (ref 4.2–5.4)
RH BLD: NORMAL
SARS-COV+SARS-COV-2 AG RESP QL IA.RAPID: NORMAL
SPECIMEN SOURCE: NORMAL
WBC # BLD AUTO: 16.6 K/UL (ref 4.8–10.8)
WBC # BLD AUTO: 19.9 K/UL (ref 4.8–10.8)

## 2020-08-20 PROCEDURE — 700102 HCHG RX REV CODE 250 W/ 637 OVERRIDE(OP): Performed by: OBSTETRICS & GYNECOLOGY

## 2020-08-20 PROCEDURE — A9270 NON-COVERED ITEM OR SERVICE: HCPCS | Performed by: OBSTETRICS & GYNECOLOGY

## 2020-08-20 PROCEDURE — 700111 HCHG RX REV CODE 636 W/ 250 OVERRIDE (IP): Performed by: OBSTETRICS & GYNECOLOGY

## 2020-08-20 PROCEDURE — 59400 OBSTETRICAL CARE: CPT | Performed by: OBSTETRICS & GYNECOLOGY

## 2020-08-20 PROCEDURE — 700105 HCHG RX REV CODE 258: Performed by: NURSE PRACTITIONER

## 2020-08-20 PROCEDURE — 86850 RBC ANTIBODY SCREEN: CPT

## 2020-08-20 PROCEDURE — 85027 COMPLETE CBC AUTOMATED: CPT

## 2020-08-20 PROCEDURE — 303615 HCHG EPIDURAL/SPINAL ANESTHESIA FOR LABOR

## 2020-08-20 PROCEDURE — 86900 BLOOD TYPING SEROLOGIC ABO: CPT

## 2020-08-20 PROCEDURE — 36415 COLL VENOUS BLD VENIPUNCTURE: CPT

## 2020-08-20 PROCEDURE — 700111 HCHG RX REV CODE 636 W/ 250 OVERRIDE (IP): Performed by: NURSE PRACTITIONER

## 2020-08-20 PROCEDURE — 304965 HCHG RECOVERY SERVICES

## 2020-08-20 PROCEDURE — 700111 HCHG RX REV CODE 636 W/ 250 OVERRIDE (IP): Performed by: ANESTHESIOLOGY

## 2020-08-20 PROCEDURE — 87426 SARSCOV CORONAVIRUS AG IA: CPT

## 2020-08-20 PROCEDURE — 59409 OBSTETRICAL CARE: CPT

## 2020-08-20 PROCEDURE — 700105 HCHG RX REV CODE 258: Performed by: OBSTETRICS & GYNECOLOGY

## 2020-08-20 PROCEDURE — 10907ZC DRAINAGE OF AMNIOTIC FLUID, THERAPEUTIC FROM PRODUCTS OF CONCEPTION, VIA NATURAL OR ARTIFICIAL OPENING: ICD-10-PCS | Performed by: OBSTETRICS & GYNECOLOGY

## 2020-08-20 PROCEDURE — 86901 BLOOD TYPING SEROLOGIC RH(D): CPT

## 2020-08-20 PROCEDURE — 770002 HCHG ROOM/CARE - OB PRIVATE (112)

## 2020-08-20 RX ORDER — BUPIVACAINE HYDROCHLORIDE 2.5 MG/ML
INJECTION, SOLUTION EPIDURAL; INFILTRATION; INTRACAUDAL
Status: COMPLETED | OUTPATIENT
Start: 2020-08-20 | End: 2020-08-20

## 2020-08-20 RX ORDER — SODIUM CHLORIDE, SODIUM LACTATE, POTASSIUM CHLORIDE, CALCIUM CHLORIDE 600; 310; 30; 20 MG/100ML; MG/100ML; MG/100ML; MG/100ML
INJECTION, SOLUTION INTRAVENOUS PRN
Status: DISCONTINUED | OUTPATIENT
Start: 2020-08-20 | End: 2020-08-21 | Stop reason: HOSPADM

## 2020-08-20 RX ORDER — SODIUM CHLORIDE 9 MG/ML
INJECTION, SOLUTION INTRAVENOUS CONTINUOUS
Status: ACTIVE | OUTPATIENT
Start: 2020-08-20 | End: 2020-08-20

## 2020-08-20 RX ORDER — DOCUSATE SODIUM 100 MG/1
100 CAPSULE, LIQUID FILLED ORAL 2 TIMES DAILY PRN
Status: DISCONTINUED | OUTPATIENT
Start: 2020-08-20 | End: 2020-08-21 | Stop reason: HOSPADM

## 2020-08-20 RX ORDER — MISOPROSTOL 200 UG/1
400 TABLET ORAL ONCE
Status: COMPLETED | OUTPATIENT
Start: 2020-08-20 | End: 2020-08-20

## 2020-08-20 RX ORDER — ACETAMINOPHEN 500 MG
1000 TABLET ORAL EVERY 6 HOURS PRN
Status: DISCONTINUED | OUTPATIENT
Start: 2020-08-20 | End: 2020-08-21 | Stop reason: HOSPADM

## 2020-08-20 RX ORDER — ROPIVACAINE HYDROCHLORIDE 2 MG/ML
INJECTION, SOLUTION EPIDURAL; INFILTRATION; PERINEURAL CONTINUOUS
Status: DISCONTINUED | OUTPATIENT
Start: 2020-08-20 | End: 2020-08-21 | Stop reason: HOSPADM

## 2020-08-20 RX ORDER — SODIUM CHLORIDE, SODIUM LACTATE, POTASSIUM CHLORIDE, AND CALCIUM CHLORIDE .6; .31; .03; .02 G/100ML; G/100ML; G/100ML; G/100ML
1000 INJECTION, SOLUTION INTRAVENOUS
Status: DISCONTINUED | OUTPATIENT
Start: 2020-08-20 | End: 2020-08-20 | Stop reason: HOSPADM

## 2020-08-20 RX ORDER — IBUPROFEN 800 MG/1
800 TABLET ORAL EVERY 8 HOURS
Status: DISCONTINUED | OUTPATIENT
Start: 2020-08-20 | End: 2020-08-21 | Stop reason: HOSPADM

## 2020-08-20 RX ORDER — METHYLERGONOVINE MALEATE 0.2 MG/ML
0.2 INJECTION INTRAVENOUS
Status: DISCONTINUED | OUTPATIENT
Start: 2020-08-20 | End: 2020-08-21 | Stop reason: HOSPADM

## 2020-08-20 RX ORDER — SODIUM CHLORIDE, SODIUM LACTATE, POTASSIUM CHLORIDE, AND CALCIUM CHLORIDE .6; .31; .03; .02 G/100ML; G/100ML; G/100ML; G/100ML
250 INJECTION, SOLUTION INTRAVENOUS PRN
Status: DISCONTINUED | OUTPATIENT
Start: 2020-08-20 | End: 2020-08-20 | Stop reason: HOSPADM

## 2020-08-20 RX ORDER — IBUPROFEN 800 MG/1
800 TABLET ORAL 3 TIMES DAILY
Status: DISCONTINUED | OUTPATIENT
Start: 2020-08-20 | End: 2020-08-20

## 2020-08-20 RX ORDER — MISOPROSTOL 200 UG/1
TABLET ORAL
Status: ACTIVE
Start: 2020-08-20 | End: 2020-08-20

## 2020-08-20 RX ORDER — MISOPROSTOL 200 UG/1
600 TABLET ORAL
Status: COMPLETED | OUTPATIENT
Start: 2020-08-20 | End: 2020-08-20

## 2020-08-20 RX ADMIN — OXYTOCIN 125 ML/HR: 10 INJECTION, SOLUTION INTRAMUSCULAR; INTRAVENOUS at 12:18

## 2020-08-20 RX ADMIN — OXYTOCIN 1 MILLI-UNITS/MIN: 10 INJECTION, SOLUTION INTRAMUSCULAR; INTRAVENOUS at 06:34

## 2020-08-20 RX ADMIN — MISOPROSTOL 600 MCG: 200 TABLET ORAL at 11:45

## 2020-08-20 RX ADMIN — SODIUM CHLORIDE, POTASSIUM CHLORIDE, SODIUM LACTATE AND CALCIUM CHLORIDE: 600; 310; 30; 20 INJECTION, SOLUTION INTRAVENOUS at 00:26

## 2020-08-20 RX ADMIN — IBUPROFEN 800 MG: 800 TABLET, FILM COATED ORAL at 21:04

## 2020-08-20 RX ADMIN — ROPIVACAINE HYDROCHLORIDE: 2 INJECTION, SOLUTION EPIDURAL; INFILTRATION at 00:30

## 2020-08-20 RX ADMIN — BUPIVACAINE HYDROCHLORIDE 10 ML: 2.5 INJECTION, SOLUTION EPIDURAL; INFILTRATION; INTRACAUDAL; PERINEURAL at 00:23

## 2020-08-20 RX ADMIN — MISOPROSTOL 400 MCG: 200 TABLET ORAL at 11:50

## 2020-08-20 RX ADMIN — IBUPROFEN 800 MG: 800 TABLET, FILM COATED ORAL at 13:38

## 2020-08-20 RX ADMIN — SODIUM CHLORIDE, POTASSIUM CHLORIDE, SODIUM LACTATE AND CALCIUM CHLORIDE 1000 ML: 600; 310; 30; 20 INJECTION, SOLUTION INTRAVENOUS at 06:13

## 2020-08-20 RX ADMIN — ONDANSETRON 4 MG: 2 INJECTION INTRAMUSCULAR; INTRAVENOUS at 01:55

## 2020-08-20 RX ADMIN — ROPIVACAINE HYDROCHLORIDE: 2 INJECTION, SOLUTION EPIDURAL; INFILTRATION at 09:34

## 2020-08-20 ASSESSMENT — EDINBURGH POSTNATAL DEPRESSION SCALE (EPDS)
I HAVE BEEN SO UNHAPPY THAT I HAVE BEEN CRYING: NO, NEVER
I HAVE BLAMED MYSELF UNNECESSARILY WHEN THINGS WENT WRONG: NOT VERY OFTEN
THE THOUGHT OF HARMING MYSELF HAS OCCURRED TO ME: NEVER
I HAVE LOOKED FORWARD WITH ENJOYMENT TO THINGS: AS MUCH AS I EVER DID
I HAVE BEEN ABLE TO LAUGH AND SEE THE FUNNY SIDE OF THINGS: AS MUCH AS I ALWAYS COULD
I HAVE BEEN ANXIOUS OR WORRIED FOR NO GOOD REASON: NO, NOT AT ALL
I HAVE FELT SAD OR MISERABLE: NO, NOT AT ALL
THINGS HAVE BEEN GETTING ON TOP OF ME: NO, I HAVE BEEN COPING AS WELL AS EVER
I HAVE BEEN SO UNHAPPY THAT I HAVE HAD DIFFICULTY SLEEPING: NOT AT ALL
I HAVE FELT SCARED OR PANICKY FOR NO GOOD REASON: NO, NOT AT ALL

## 2020-08-20 ASSESSMENT — PAIN SCALES - GENERAL: PAIN_LEVEL: 3

## 2020-08-20 NOTE — PROGRESS NOTES
0723- RAJI Lopez at bedside to assess pt. SVE 7/90/-2. Pt currently on 1 juliet-unit/min and RAJI Lopez would like to change order to go up by 2 juliet-units as needed for pitocin. Pt c/o some nausea on her side. Pt repositioned onto right side with wedge. Pt not able to received zofran at this time due to last dose at 0155.   1245- Fundus rubbed with several large clots. Uterus up off to right inittially and after rub - firm and at U. All pads weighed. Total of 355 ml. Dr Briseno made aware. Orders to watch her bleeding closely and inform her if it continues.  1325- Pt unable to urinate on the bedpan. Pt straight cathed with 450 ml out. Fundus rubbed and firm at U with light-mod lochia rubra.   1345- Pt feels like she needs to have a bowl movement. Epidural d/c'd with tip intact. Pt assisted up to BR and had diarrhea.   1400- Bleeding light. Pads and gown changed. Pt transferred to PPU via wheelchair with infant in arms and FOB at side. Report given to CLAY Elizabeth.

## 2020-08-20 NOTE — L&D DELIVERY NOTE
Ronit is a 25 y.o. , now Para [address]90 s/p  of a viable female infant.     Weeks of gestation: 38w3d    Diagnosis: IUP at Term, Admitted for Active Labor       Stage 1: After complete dilatation of the cervix, the field was prepped in a sterile fashion for delivery       Stage 2:  After continuous pushing the head was successfully delivered over intact perineum in the RHYS position. Shortly after, the anterior shoulder was easily delivered followed by the posterior shoulder and the remainder of the body at 1134. Baby placed on mothers abdomen. Nasopharynx and oropharynx suctioned. Delayed cord clamping was done for approximately 60 seconds. APGARs 8 and 9.  Positive for body cord around right shoulder.       Stage 3: Placenta delivered spontaneously intact with firm traction on the umbilical cord and suprapubic pressure. A 3 Vessel cord noted. Fundal massage done to decrease bleeding. Excellent hemostasis obtained with the addition of pitocin. No Lacerations noted on the perineum and no further repair was needed. Cytotec was given rectally and orally due to previous history of postpartum hemorrhage.      cc   Sponge and needle counts correct.   Patient tolerated procedure well.      Jerrica Briseno MD  PGY-1  Family Medicine Resident        I was present and supervised the delivery of the baby and the placenta that was performed by resident physician and assisted by myself. I examined patient after the procedure and no lacerations were noted.   I agree with the resident physician note and the patient and baby were left in stable condition.  Fundus firm, cytotec given prophylactically.    Albina Sanchez D.O.

## 2020-08-20 NOTE — ANESTHESIA QCDR
2019 [facility] Clinical Data Registry (for Quality Improvement)     Postoperative nausea/vomiting risk protocol (Adult = 18 yrs and Pediatric 3-17 yrs)- (430 and 463)  General inhalation anesthetic (NOT TIVA) with PONV risk factors: No  Provision of anti-emetic therapy with at least 2 different classes of agents: N/A  Patient DID NOT receive anti-emetic therapy and reason is documented in Medical Record: N/A    Multimodal Pain Management- (477)  Non-emergent surgery AND patient age >= 18: No  Use of Multimodal Pain Management, two or more drugs and/or interventions, NOT including systemic opioids:   Exception: Documented allergy to multiple classes of analgesics:     Smoking Abstinence (404)  Patient is current smoker (cigarette, pipe, e-cig, marijuanna): No  Elective Surgery:   Abstinence instructions provided prior to day of surgery:   Patient abstained from smoking on day of surgery:     Pre-Op Beta-Blocker in Isolated CABG (44)  Isolated CABG AND patient age >= 18: No  Beta-blocker admin within 24 hours of surgical incision:   Exception:of medical reason(s) for not administering beta blocker within 24 hours prior to surgical incision (e.g., not  indicated,other medical reason):     PACU assessment of acute postoperative pain prior to Anesthesia Care End- Applies to Patients Age = 18- (ABG7)  Initial PACU pain score is which of the following: < 7/10  Patient unable to report pain score: N/A    Post-anesthetic transfer of care checklist/protocol to PACU/ICU- (426 and 427)  Upon conclusion of case, patient transferred to which of the following locations: PACU/Non-ICU  Use of transfer checklist/protocol: Yes  Exclusion: Service Performed in Patient Hospital Room (and thus did not require transfer): N/A  Unplanned admission to ICU related to anesthesia service up through end of PACU care- (MD51)  Unplanned admission to ICU (not initially anticipated at anesthesia start time): No

## 2020-08-20 NOTE — ANESTHESIA TIME REPORT
Anesthesia Start and Stop Event Times     Date Time Event    8/20/2020 0009 Anesthesia Start     0023 Ready for Procedure     1134 Anesthesia Stop        Responsible Staff  08/20/20    Name Role Begin End    Martin Elena M.D. Anesth 0009 0657    Daya Selby M.D. Anesth 0657 1134        Preop Diagnosis (Free Text):  Pre-op Diagnosis             Preop Diagnosis (Codes):    Post op Diagnosis  Admitted to labor and delivery      Premium Reason  A. 3PM - 7AM    Comments:

## 2020-08-20 NOTE — PROGRESS NOTES
Called to patients room, patient states she feels bleeding, pad checked and approx 3/4 full. Patient states she has to use the bathroom, assisted up to bathroom. Patient voided and also had episode of diarrhea. Attempted to contact md for diarrhea medication and message left because she was in surgery. Lacey care done and patient assisted back to bed. No dizziness, gait steady. Fundus checked firm at u.  1517- cna reported patient has temp of 100.2 temporal. Patient encouraged to increase fluid intake.other vitals wnl.

## 2020-08-20 NOTE — PROGRESS NOTES
Asked to evaluate fetal presentation    Cephalic on US.  SVE 6/80/-2, vertex but ascynclitic with ear palpable to maternal right.  AROM clear, will monitor for decreased asynclitism as head descends.        Kusum Alcantara MD  [facility], Women's Health

## 2020-08-20 NOTE — PROGRESS NOTES
Patient of [facility] presents with complaints of UC's. [place] Gestation today at 38.3 weeks.     Reports contractions Q3min denies problems with Pregnancy but does admit to PPH with last pregnancy no complaints of LOF, complaints of mild bleeding with mucus when using restroom. Denies change to vision/edema/HA, Reports positive Fm. FM/TOCO placed, POC discussed, FOB Shayne at BS. Patient encouraged to call RN with all questions concerns needs prn.     RAJI Lopez CNM called, admit orders rec'd, IV started, admit profile complete, labs sent to lab. Patient being bolused for epidural.      Patient would like FOB, Shayne at bedside for delivery phase. Patient would like skin to skin, FOB to cut cord after pulsating, breast feeding with Similac as necessary.     EFM used, discussed and in place.     Patient and family deny questions regarding care since arriving at [facility]. Dry erase board updated with RN contact information, discussed. Patient and family encouraged to call RN with all questions, concerns and needs.     0015: Pt. Sitting up for epidural. Dr. Elena at BS for placement. Patient tolerated well. Renteria placed. Pt. Resting with FOB at BS.     0620: RAJI Lopez and Dr. Pal at BS for US confirmed fetal partf; cephalic with ear presentation. AROM; clear. Orders to start pitocin titration - see MAR. Second IV placed per Dr. Pal RE: PPH with 1200ml blood loss in last pregnancy.     0700: Report given to KHOA Alberto RN.

## 2020-08-20 NOTE — PROGRESS NOTES
Patient transferred from labor and delivery to room 310. Patient and family oriented to room and postpartum routine. Assessment done. Patient has no c/o pain. Lochia light  Fundus firm. IV site without redness, swelling or drainage.2nd bag pitocin placed on pump at 125/hr.second IV site saline locked.Safety and security reviewed.

## 2020-08-20 NOTE — ANESTHESIA POSTPROCEDURE EVALUATION
Patient: Ronit Tripathi    Procedure Summary     Date: 08/20/20 Room / Location:     Anesthesia Start: 0009 Anesthesia Stop: 1134    Procedure: Labor Epidural Diagnosis:     Scheduled Providers:  Responsible Provider: Daya Selby M.D.    Anesthesia Type: epidural ASA Status: 2          Final Anesthesia Type: epidural  Last vitals  BP   Blood Pressure: 121/70    Temp   36.7 °C (98 °F)    Pulse   Pulse: 87   Resp     18   SpO2   98 %      Anesthesia Post Evaluation    Patient location during evaluation: floor  Patient participation: complete - patient participated  Level of consciousness: awake and alert  Pain score: 3    Airway patency: patent  Anesthetic complications: no  Cardiovascular status: hemodynamically stable  Respiratory status: acceptable  Hydration status: euvolemic    PONV: none

## 2020-08-20 NOTE — ANESTHESIA PROCEDURE NOTES
Epidural Block    Date/Time: 8/20/2020 12:23 AM  Performed by: Martin Elena M.D.  Authorized by: Martin Elena M.D.     Patient Location:  OB  Start Time:  8/20/2020 12:23 AM  Reason for Block: labor analgesia    patient identified, IV checked, site marked, risks and benefits discussed, surgical consent, monitors and equipment checked, pre-op evaluation and timeout performed    Patient Position:  Sitting  Prep: ChloraPrep, patient draped and sterile technique    Monitoring:  Blood pressure, continuous pulse oximetry and heart rate  Approach:  Midline  Location:  L3-L4  Injection Technique:  LISSETT saline  Skin infiltration:  Lidocaine  Strength:  1%  Dose:  3ml  Needle Type:  Tuohy  Needle Gauge:  17 G  Needle Length:  3.5 in  Loss of resistance::  3  Catheter Size:  19 G  Catheter at Skin Depth:  3  Test Dose Result:  Negative

## 2020-08-20 NOTE — CARE PLAN
Problem: Infection  Goal: Will remain free from infection  Outcome: PROGRESSING AS EXPECTED  Note: Pt free from s/s of infection at this time     Problem: Pain  Goal: Alleviation of Pain or a reduction in pain to the patient's comfort goal  Outcome: PROGRESSING AS EXPECTED  Note: Pt comfortable with epidural

## 2020-08-20 NOTE — H&P
History and Physical      Ronit Tripathi is a 25 y.o. year old female  at 38w3d who presents for RUCs.    Subjective:   positive  For CTXS.   positive Feels pain   negative for LOF  negative for vaginal bleeding.   positive for fetal movement    ROS: A comprehensive review of systems was negative.    Past Medical History:   Diagnosis Date   • Anemia    • Blood transfusion without reported diagnosis        • Chalazion of right upper eyelid 3/7/2017   • Hypertension    • Nausea and vomiting in pregnancy     ,   • Pregnant      No past surgical history on file.  OB History    Para Term  AB Living   17 4 4 0 12 4   SAB TAB Ectopic Molar Multiple Live Births   4 5 0     4      # Outcome Date GA Lbr Ke/2nd Weight Sex Delivery Anes PTL Lv   17 Current            16 2019           15 SAB            14 SAB            13 2019           12 TAB            11 Term 17 38w1d  2.955 kg (6 lb 8.2 oz) F Vag-Spont  N PASCUAL      Birth Comments: hemorraghe after delivery   10 Term 04/07/15 40w0d  3.062 kg (6 lb 12 oz) M Vag-Spont None Y PASCUAL      Birth Comments: Pt states no complications   9 TAB 2014     TAB         Birth Comments: NO COMPLICATIONS   8 AB  6w0d             Birth Comments: Pt states no complications   7 TAB 2012     TAB         Birth Comments: NO COMPLICATIONS   6 Term 12 39w5d  2.92 kg (6 lb 7 oz) F Vag-Spont EPI N PASCUAL      Birth Comments: Pt states no complications   5 AB  8w0d             Birth Comments: Pt. states wasnt ready for a baby.   4 Term 12/28/10 38w3d  2.693 kg (5 lb 15 oz) F Vag-Spont EPI Y PASCUAL      Birth Comments: Pt. states no complications.   3 AB  8w0d             Birth Comments: Pt. states couldnt afford the baby so decided to have    2 TAB  6w0d             Birth Comments: no compl, some regret, feels mother forced her.     1 TAB  6w0d             Birth Comments: no compl, still feels some regret,  feels forced by mother.     Social History     Socioeconomic History   • Marital status: Single     Spouse name: Not on file   • Number of children: Not on file   • Years of education: Not on file   • Highest education level: Not on file   Occupational History   • Not on file   Social Needs   • Financial resource strain: Not on file   • Food insecurity     Worry: Not on file     Inability: Not on file   • Transportation needs     Medical: Not on file     Non-medical: Not on file   Tobacco Use   • Smoking status: Former Smoker     Packs/day: 0.00     Years: 1.00     Pack years: 0.00   • Smokeless tobacco: Never Used   • Tobacco comment: Stopped smoking once she found out she was pregnant   Substance and Sexual Activity   • Alcohol use: No   • Drug use: Not Currently     Types: Marijuana   • Sexual activity: Not Currently     Partners: Male     Comment: None    Lifestyle   • Physical activity     Days per week: Not on file     Minutes per session: Not on file   • Stress: Not on file   Relationships   • Social connections     Talks on phone: Not on file     Gets together: Not on file     Attends [employer] service: Not on file     Active member of club or organization: Not on file     Attends meetings of clubs or organizations: Not on file     Relationship status: Not on file   • Intimate partner violence     Fear of current or ex partner: Not on file     Emotionally abused: Not on file     Physically abused: Not on file     Forced sexual activity: Not on file   Other Topics Concern   • Not on file   Social History Narrative    ** Merged History Encounter **          Allergies: Patient has no known allergies.    Current Facility-Administered Medications:   •  lactated ringers (BOLUS) infusion, 1,000 mL, Intravenous, Once PRN, Martin Elena M.D.  •  ropivacaine (NAROPIN) injection, , Epidural, Continuous, Martin Elena M.D.  •  ePHEDrine injection 5 mg, 5 mg, Intravenous, Q5 MIN PRN **AND** Notify Anesthesiologist if  ephedrine given and for sustained hypotension (more than 2 minutes), , , Once **AND** For Hypotension: Place patient in left lateral tilt to achieve uterine displacement and elevate legs., , , PRN **AND** Hypotension: Oxygen Continuous, , , CONTINUOUS **AND** lactated ringers infusion (BOLUS), 250 mL, Intravenous, PRN, Martin Elena M.D., Last Rate: 1,000 mL/hr at 08/19/20 2351, 1,000 mL at 08/19/20 2351  •  oxytocin (PITOCIN) 20 UNITS/1000ML LR, , , ,   •  LACTATED RINGERS IV SOLN, , , ,   •  LR infusion, , Intravenous, Continuous, Anuradha Lopez, A.P.R.N., Last Rate: 125 mL/hr at 08/20/20 0026  •  fentaNYL (SUBLIMAZE) injection 50 mcg, 50 mcg, Intravenous, Q HOUR PRN, Anuradha Lopez, A.P.R.N.  •  fentaNYL (SUBLIMAZE) injection 100 mcg, 100 mcg, Intravenous, Q HOUR PRN, Anuradha Lopez, A.P.R.N.  •  mag hydrox-al hydrox-simeth (MAALOX PLUS ES or MYLANTA DS) suspension 30 mL, 30 mL, Oral, Q6HRS PRN, Anuradha Lopez, A.P.R.N.  •  ondansetron (ZOFRAN ODT) dispertab 4 mg, 4 mg, Oral, Q6HRS PRN **OR** ondansetron (ZOFRAN) syringe/vial injection 4 mg, 4 mg, Intravenous, Q6HRS PRN, Anuradha Lopez, A.P.R.N.  •  oxytocin (PITOCIN) infusion (for postpartum), 2,000 mL/hr, Intravenous, Once **FOLLOWED BY** oxytocin (PITOCIN) infusion (for postpartum),  mL/hr, Intravenous, Continuous, Anuradha Lopez, A.P.R.N.  •  miSOPROStol (CYTOTEC) tablet 800 mcg, 800 mcg, Rectal, Once PRN, Anuradha Lopez, A.P.R.N.  •  methylergonovine (METHERGINE) injection 0.2 mg, 0.2 mg, Intramuscular, Once PRN, Anuradha Lopez, A.P.R.N.  •  ibuprofen (MOTRIN) tablet 600 mg, 600 mg, Oral, Q6HRS PRN, Anuradha Lopez, A.P.R.N.  •  ROPIVACAINE HCL 2 MG/ML INJ SOLN, , , ,     Prenatal care with [facility] starting at 8wks with following problems:  Patient Active Problem List    Diagnosis Date Noted   • Echogenic focus of heart of fetus affecting antepartum care of mother 04/08/2020   • History of postpartum  "hemorrhage, currently pregnant 02/25/2020   • History of multiple miscarriages 02/25/2020   • Supervision of normal pregnancy 10/14/2014         Objective:      /73   Pulse 89   Ht 1.676 m (5' 6\")   Wt 84.4 kg (186 lb)     General:   no acute distress, alert, cooperative   Skin:   normal   HEENT:  PERRLA   Lungs:   CTA bilateral   Heart:   S1, S2 normal, no murmur, click, rub or gallop, regular rate and rhythm, brisk carotid upstroke without bruits, peripheral pulses very brisk, chest is clear without rales or wheezing, no pedal edema, no JVD, no hepatosplenomegaly   Abdomen:   gravid, NT   EFW:  3500 g   Pelvis:  Exam deferred., proven to 3000 g   FHTs: 130 BPM + accels - decels mod variability   Contractions: 2 contractions in 10 min firm to palpation   Uterine Size: S=D   Presentations: Cephalic per RN   Cervix: Per RN    Dilation: 6-7    Effacement: 80    Station:  -3    Consistency: Medium    Position: Middle     Complete OB US  4/7/2020 9:03 AM     HISTORY/REASON FOR EXAM:  Evaluate fetal anatomy     TECHNIQUE/EXAM DESCRIPTION: OB complete ultrasound.     COMPARISON:  None     FINDINGS:  Fetal Lie:  Transverse  LMP:  12/1/2019  Clinical BLAYNE by LMP:  8/31/2020     Placenta (Location):  Anterior  Placenta Previa: No  Placental Grade: I     Amniotic Fluid Volume:  MAR = 14.91 cm     Fetal Heart Rate:  131 bpm     Cervical Length:  3.22 cm transabdominal     No maternal adnexal mass is identified.     Umbilical Artery S/D Ratio(s):  Not applicable     Fetal Anatomy  (Seen or Not Seen)  Lateral Ventricles     Seen  Cisterna Magna        Seen  Cerebellum              Seen  CSP             Seen  Orbits             Seen  Face/Lips                Seen  Cord Insertion         Seen  Placental CI         Seen  4 Chamber Heart     Seen  LVOT               Seen  RVOT              Seen  3 Vessel View     Seen  Stomach       Seen  Kidneys                   Seen  Urinary Bladder      Seen  Spine                       " Seen  3 Vessel Cord          Seen  Both Upper Extremities    Seen  Both Lower Extremities    Seen  Diaphragm             Seen  Movement       Seen  Gender:     Fetal Biometry  BPD    4.56 cm, 19 weeks, 5 days  HC    17.04 cm, 19 weeks, 5 days  AC    14.34 cm, 19 weeks, 5 days  Femur Length    2.97 cm, 19 weeks, 1 day  Humerus Length    2.78 cm, 18 weeks, 6 days  Cerebellum Diameter   2.0 cm     EGA by this US:  19 weeks, 3 days  BLAYNE by this US: 8/29/2020  Working BLAYNE:  8/31/2020     Estimated Fetal Weight:  294 grams  EFW Percentile: 63.4%     Comments:  Exam limited by fetal position and patient lack of cooperation. There is a potential nonspecific echogenic focus in the left ventricle of the fetal heart.     IMPRESSION:     1.  Single intrauterine pregnancy of an estimated gestational age of 19 weeks, 3 days with an estimated date of delivery of 8/29/2020.  2.  There is a potential nonspecific echogenic focus in the left ventricle of the fetal heart.  3.  Fetal survey is otherwise within normal limits.    Lab Review  Lab:   Blood type: A     Recent Results (from the past 5880 hour(s))   HCG QUANTITATIVE    Collection Time: 01/03/20 10:40 AM   Result Value Ref Range    Bhcg 39883.3 (H) 0.0 - 5.0 mIU/mL   HCG QUANTITATIVE    Collection Time: 01/06/20 10:34 AM   Result Value Ref Range    Bhcg 91709.7 (H) 0.0 - 5.0 mIU/mL   VAGINAL PATHOGENS DNA PANEL    Collection Time: 01/30/20  2:02 PM   Result Value Ref Range    Candida species DNA Probe Negative Negative    Trichamonas vaginalis DNA Probe Negative Negative    Gardnerella vaginalis DNA Probe POSITIVE (A) Negative   Chlamydia/GC PCR Urine Or Swab    Collection Time: 01/30/20  2:02 PM    Specimen: Genital   Result Value Ref Range    C. trachomatis by PCR Negative Negative    N. gonorrhoeae by PCR Negative Negative    Source Genital    URINALYSIS,CULTURE IF INDICATED    Collection Time: 02/12/20 10:06 AM    Specimen: Blood   Result Value Ref Range    Color Yellow      Character Clear     Specific Gravity 1.017 <1.035    Ph >=9.0 (A) 5.0 - 8.0    Glucose Negative Negative mg/dL    Ketones Negative Negative mg/dL    Protein Negative Negative mg/dL    Bilirubin Negative Negative    Urobilinogen, Urine 0.2 Negative    Nitrite Negative Negative    Leukocyte Esterase Negative Negative    Occult Blood Moderate (A) Negative    Micro Urine Req Microscopic    URINE MICROSCOPIC (W/UA)    Collection Time: 02/12/20 10:06 AM   Result Value Ref Range    WBC 2-5 /hpf    RBC 5-10 (A) /hpf    Bacteria Few (A) None /hpf    Epithelial Cells Few /hpf    Hyaline Cast 0-2 /lpf   UR PROTEIN SSA    Collection Time: 02/12/20 10:06 AM   Result Value Ref Range    Protein Negative Negative mg/dL   POC UA    Collection Time: 02/12/20 10:08 AM   Result Value Ref Range    POC Color Yellow     POC Appearance Clear     POC Glucose Negative Negative mg/dL    POC Ketones Negative Negative mg/dL    POC Specific Gravity 1.020 1.005 - 1.030    POC Blood Moderate (A) Negative    POC Urine PH 8.5 (A) 5.0 - 8.0    POC Protein Trace (A) Negative mg/dL    POC Nitrites Negative Negative    POC Leukocyte Esterase Trace (A) Negative   CBC WITH DIFFERENTIAL    Collection Time: 02/12/20 10:12 AM   Result Value Ref Range    WBC 10.3 4.8 - 10.8 K/uL    RBC 5.00 4.20 - 5.40 M/uL    Hemoglobin 14.3 12.0 - 16.0 g/dL    Hematocrit 41.9 37.0 - 47.0 %    MCV 83.8 81.4 - 97.8 fL    MCH 28.6 27.0 - 33.0 pg    MCHC 34.1 33.6 - 35.0 g/dL    RDW 41.9 35.9 - 50.0 fL    Platelet Count 274 164 - 446 K/uL    MPV 9.8 9.0 - 12.9 fL    Neutrophils-Polys 67.40 44.00 - 72.00 %    Lymphocytes 24.00 22.00 - 41.00 %    Monocytes 6.30 0.00 - 13.40 %    Eosinophils 1.60 0.00 - 6.90 %    Basophils 0.50 0.00 - 1.80 %    Immature Granulocytes 0.20 0.00 - 0.90 %    Nucleated RBC 0.00 /100 WBC    Neutrophils (Absolute) 6.94 2.00 - 7.15 K/uL    Lymphs (Absolute) 2.47 1.00 - 4.80 K/uL    Monos (Absolute) 0.65 0.00 - 0.85 K/uL    Eos (Absolute) 0.16 0.00 - 0.51  K/uL    Baso (Absolute) 0.05 0.00 - 0.12 K/uL    Immature Granulocytes (abs) 0.02 0.00 - 0.11 K/uL    NRBC (Absolute) 0.00 K/uL   COMP METABOLIC PANEL    Collection Time: 02/12/20 10:12 AM   Result Value Ref Range    Sodium 137 135 - 145 mmol/L    Potassium 3.8 3.6 - 5.5 mmol/L    Chloride 105 96 - 112 mmol/L    Co2 23 20 - 33 mmol/L    Anion Gap 9.0 0.0 - 11.9    Glucose 76 65 - 99 mg/dL    Bun 8 8 - 22 mg/dL    Creatinine 0.52 0.50 - 1.40 mg/dL    Calcium 8.8 8.5 - 10.5 mg/dL    AST(SGOT) 20 12 - 45 U/L    ALT(SGPT) 10 2 - 50 U/L    Alkaline Phosphatase 55 30 - 99 U/L    Total Bilirubin 0.3 0.1 - 1.5 mg/dL    Albumin 4.1 3.2 - 4.9 g/dL    Total Protein 7.8 6.0 - 8.2 g/dL    Globulin 3.7 (H) 1.9 - 3.5 g/dL    A-G Ratio 1.1 g/dL   LIPASE    Collection Time: 02/12/20 10:12 AM   Result Value Ref Range    Lipase 62 11 - 82 U/L   HCG QUANTITATIVE    Collection Time: 02/12/20 10:12 AM   Result Value Ref Range    Bhcg 14007.2 (H) 0.0 - 5.0 mIU/mL   RH TYPE FOR RHOGAM FROM E.D.    Collection Time: 02/12/20 10:12 AM   Result Value Ref Range    Emergency Department Rh Typing POS     Number Of Rh Doses Indicated ZERO    ESTIMATED GFR    Collection Time: 02/12/20 10:12 AM   Result Value Ref Range    GFR If African American >60 >60 mL/min/1.73 m 2    GFR If Non African American >60 >60 mL/min/1.73 m 2   POCT Influenza A/B    Collection Time: 02/20/20  6:35 PM   Result Value Ref Range    Rapid Influenza A-B Positive A     Internal Control Positive Positive     Internal Control Negative Negative    CBC WITH DIFFERENTIAL    Collection Time: 02/21/20 10:15 AM   Result Value Ref Range    WBC 5.1 4.8 - 10.8 K/uL    RBC 4.69 4.20 - 5.40 M/uL    Hemoglobin 13.3 12.0 - 16.0 g/dL    Hematocrit 37.4 37.0 - 47.0 %    MCV 79.7 (L) 81.4 - 97.8 fL    MCH 28.4 27.0 - 33.0 pg    MCHC 35.6 (H) 33.6 - 35.0 g/dL    RDW 38.1 35.9 - 50.0 fL    Platelet Count 219 164 - 446 K/uL    MPV 9.8 9.0 - 12.9 fL    Neutrophils-Polys 63.70 44.00 - 72.00 %     Lymphocytes 19.30 (L) 22.00 - 41.00 %    Monocytes 16.20 (H) 0.00 - 13.40 %    Eosinophils 0.20 0.00 - 6.90 %    Basophils 0.20 0.00 - 1.80 %    Immature Granulocytes 0.40 0.00 - 0.90 %    Nucleated RBC 0.00 /100 WBC    Neutrophils (Absolute) 3.23 2.00 - 7.15 K/uL    Lymphs (Absolute) 0.98 (L) 1.00 - 4.80 K/uL    Monos (Absolute) 0.82 0.00 - 0.85 K/uL    Eos (Absolute) 0.01 0.00 - 0.51 K/uL    Baso (Absolute) 0.01 0.00 - 0.12 K/uL    Immature Granulocytes (abs) 0.02 0.00 - 0.11 K/uL    NRBC (Absolute) 0.00 K/uL   THINPREP RFLX HPV ASCUS W/CTNG    Collection Time: 02/25/20  4:07 PM   Result Value Ref Range    Cytology Reg See Path Report     C. trachomatis by PCR Negative Negative    N. gonorrhoeae by PCR Negative Negative    Source Cervical    AFP TETRA    Collection Time: 04/20/20 11:22 AM   Result Value Ref Range    AFP Value -Eia 110 ng/mL    AFP MOM Value 1.81     Ue3 Value 2.20 ng/mL    Ue3 Mom 0.82     Patient's hCG, 2nd Trimester 7502 IU/L    hCG MoM, 2nd Trimester 0.41     Melinda Value -Eia 162 pg/mL    Melinda Mom Value 0.96     Interpretation Screen Neg     M[age] BLAYNE [age] yr    Maternal Weight 170.0 lbs.     Gest. Age on Collection Date 21 wks, 0 days     Gestational Age Based On Ultrasound     Multiple Pregnancy [place]     Race Nonblack     Insulin Dependent Diabetes No     Smoking No     Family Hx NTD No     Family Hx of Aneuploidy No     Specimen See Note     EER Quad, Maternal Serum See Note    POCT Urinalysis    Collection Time: 04/21/20 11:20 AM   Result Value Ref Range    POC Color Dark Yellow Negative    POC Appearance Clear Negative    POC Leukocyte Esterase Small Negative    POC Nitrites Negative Negative    POC Urobiligen n/a Negative (0.2) mg/dL    POC Protein Trace Negative mg/dL    POC Urine PH 7.0 5.0 - 8.0    POC Blood Negative Negative    POC Specific Gravity 1.020 <1.005 - >1.030    POC Ketones Negative Negative mg/dL    POC Bilirubin n/a Negative mg/dL    POC Glucose Negative  Negative mg/dL   VAGINAL PATHOGENS DNA PANEL    Collection Time: 04/21/20 11:52 AM   Result Value Ref Range    Candida species DNA Probe Negative Negative    Trichamonas vaginalis DNA Probe Negative Negative    Gardnerella vaginalis DNA Probe POSITIVE (A) Negative   PREG CNTR PRENATAL PN    Collection Time: 06/18/20 12:00 PM   Result Value Ref Range    Color Yellow     Character Cloudy (A)     Specific Gravity 1.006 <1.035    Ph 7.5 5.0 - 8.0    Glucose Negative Negative mg/dL    Ketones Negative Negative mg/dL    Protein Negative Negative mg/dL    Bilirubin Negative Negative    Urobilinogen, Urine 0.2 Negative    Nitrite Negative Negative    Leukocyte Esterase Large (A) Negative    Occult Blood Negative Negative    Micro Urine Req Microscopic     WBC 11.2 (H) 4.8 - 10.8 K/uL    RBC 4.53 4.20 - 5.40 M/uL    Hemoglobin 12.3 12.0 - 16.0 g/dL    Hematocrit 37.2 37.0 - 47.0 %    MCV 82.1 81.4 - 97.8 fL    MCH 27.2 27.0 - 33.0 pg    MCHC 33.1 (L) 33.6 - 35.0 g/dL    RDW 36.2 35.9 - 50.0 fL    Platelet Count 248 164 - 446 K/uL    MPV 11.2 9.0 - 12.9 fL    Neutrophils-Polys 68.70 44.00 - 72.00 %    Lymphocytes 22.80 22.00 - 41.00 %    Monocytes 5.90 0.00 - 13.40 %    Eosinophils 1.10 0.00 - 6.90 %    Basophils 0.50 0.00 - 1.80 %    Immature Granulocytes 1.00 (H) 0.00 - 0.90 %    Nucleated RBC 0.00 /100 WBC    Neutrophils (Absolute) 7.66 (H) 2.00 - 7.15 K/uL    Lymphs (Absolute) 2.55 1.00 - 4.80 K/uL    Monos (Absolute) 0.66 0.00 - 0.85 K/uL    Eos (Absolute) 0.12 0.00 - 0.51 K/uL    Baso (Absolute) 0.06 0.00 - 0.12 K/uL    Immature Granulocytes (abs) 0.11 0.00 - 0.11 K/uL    NRBC (Absolute) 0.00 K/uL    Hepatitis B Surface Antigen Non-Reactive Non-Reactive    Rubella IgG Antibody 88.80 IU/mL    Syphilis, Treponemal Qual Non-Reactive Non-Reactive   URINE DRUG SCREEN W/CONF (AR)    Collection Time: 06/18/20 12:00 PM   Result Value Ref Range    Urine Amphetamine-Methamphetam Negative Cutoff 300 ng/mL    Barbiturates Negative  Cutoff 200 ng/mL    Benzodiazepines Negative Cutoff 200 ng/mL    Propoxyphene Negative Cutoff 300 ng/mL    Cocaine Metabolite Negative Cutoff 150 ng/mL    Methadone Negative Cutoff 150 ng/mL    Codeine-Morphine Negative Cutoff 300 ng/mL    Phencyclidine -Pcp Negative Cutoff 25 ng/mL    Cannabinoid Metab Negative Cutoff 20 ng/mL    Drug Comment Urine Drugs See Note    HIV AG/AB COMBO ASSAY SCREENING    Collection Time: 06/18/20 12:00 PM   Result Value Ref Range    HIV Ag/Ab Combo Assay Non-Reactive Non Reactive   GLUCOSE 1HR GESTATIONAL    Collection Time: 06/18/20 12:00 PM   Result Value Ref Range    Glucose, Post Dose 112 70 - 139 mg/dL   HEP C VIRUS ANTIBODY    Collection Time: 06/18/20 12:00 PM   Result Value Ref Range    Hepatitis C Antibody Non-Reactive Non-Reactive   OP Prenatal Panel-Blood Bank    Collection Time: 06/18/20 12:00 PM   Result Value Ref Range    ABO Grouping Only A     Rh Grouping Only POS     Antibody Screen Scrn NEG    URINE MICROSCOPIC (W/UA)    Collection Time: 06/18/20 12:00 PM   Result Value Ref Range    WBC 20-50 (A) /hpf    RBC 2-5 (A) /hpf    Bacteria Few (A) None /hpf    Epithelial Cells Moderate (A) /hpf    Hyaline Cast 0-2 /lpf   URINE CULTURE(NEW)    Collection Time: 06/18/20 12:00 PM    Specimen: Urine   Result Value Ref Range    Significant Indicator NEG     Source UR     Site -     Culture Result Mixed skin madisyn 10-50,000 cfu/mL    GRP B STREP, BY PCR (HAMMOND BROTH)    Collection Time: 08/05/20  4:06 PM    Specimen: Genital   Result Value Ref Range    Strep Gp B DNA PCR Negative Negative   CBC WITH DIFFERENTIAL    Collection Time: 08/19/20 11:26 PM   Result Value Ref Range    WBC 16.6 (H) 4.8 - 10.8 K/uL    RBC 4.55 4.20 - 5.40 M/uL    Hemoglobin 11.3 (L) 12.0 - 16.0 g/dL    Hematocrit 35.2 (L) 37.0 - 47.0 %    MCV 77.4 (L) 81.4 - 97.8 fL    MCH 24.8 (L) 27.0 - 33.0 pg    MCHC 32.1 (L) 33.6 - 35.0 g/dL    RDW 38.6 35.9 - 50.0 fL    Platelet Count 251 164 - 446 K/uL    MPV 11.3 9.0  - 12.9 fL    Neutrophils-Polys 70.40 44.00 - 72.00 %    Lymphocytes 20.40 (L) 22.00 - 41.00 %    Monocytes 7.20 0.00 - 13.40 %    Eosinophils 0.60 0.00 - 6.90 %    Basophils 0.40 0.00 - 1.80 %    Immature Granulocytes 1.00 (H) 0.00 - 0.90 %    Nucleated RBC 0.00 /100 WBC    Neutrophils (Absolute) 11.66 (H) 2.00 - 7.15 K/uL    Lymphs (Absolute) 3.37 1.00 - 4.80 K/uL    Monos (Absolute) 1.19 (H) 0.00 - 0.85 K/uL    Eos (Absolute) 0.10 0.00 - 0.51 K/uL    Baso (Absolute) 0.06 0.00 - 0.12 K/uL    Immature Granulocytes (abs) 0.17 (H) 0.00 - 0.11 K/uL    NRBC (Absolute) 0.00 K/uL   Hold Blood Bank Specimen (Not Tested)    Collection Time: 08/19/20 11:26 PM   Result Value Ref Range    Holding Tube - Bb DONE    COVID/SARS CoV-2 PCR    Collection Time: 08/20/20 12:00 AM    Specimen: Nasopharyngeal; Respirate   Result Value Ref Range    COVID Order Status Received         Assessment:   1.  IUP at 38w3d  2.  Labor status: Active phase labor.  3.  Cat 1 FHTs  4.  Obstetrical history significant for   Patient Active Problem List    Diagnosis Date Noted   • Echogenic focus of heart of fetus affecting antepartum care of mother 04/08/2020   • History of postpartum hemorrhage, currently pregnant 02/25/2020   • History of multiple miscarriages 02/25/2020   • Supervision of normal pregnancy 10/14/2014   .      Plan:     Admit to L&D  GBS negative  Routine labs  Pain management prn - pt wants epidural.

## 2020-08-20 NOTE — PROGRESS NOTES
S: Pt is comfortable c epidural.      O:    Vitals:    08/20/20 0444 08/20/20 0504 08/20/20 0524 08/20/20 0544   BP: 112/69 109/58 113/62 111/66   Pulse: 78 73 69 86   SpO2:       Weight:       Height:               FHTs:  Baseline 135, + accels, - decels, mod variability        [place]: 3 contractions in 10 minutes, firm to palpation        SVE: 6/80/-2        Fetal presentation confirmed with Dr. Alcantara, vertex via BSUS, but has a presenting ear.       A/P:    1.  IUP @ 38w3d   2.  Cat 1 FHTs    3.  Pitocin augmentation PRN.    4.  Will re-evaluate soon.    Anuradha Lopez, CNM, APN  Dr. Alcantara -- attending physician

## 2020-08-20 NOTE — ANESTHESIA PREPROCEDURE EVALUATION
Relevant Problems   NEURO   (+) History of multiple miscarriages   (+) History of postpartum hemorrhage, currently pregnant       Physical Exam    Airway   Mallampati: II  TM distance: >3 FB  Neck ROM: full       Cardiovascular - normal exam  Rhythm: regular  Rate: normal  (-) murmur     Dental - normal exam           Pulmonary - normal exam  Breath sounds clear to auscultation     Abdominal    Neurological - normal exam                 Anesthesia Plan    ASA 2       Plan - epidural   Neuraxial block will be labor analgesia              Pertinent diagnostic labs and testing reviewed    Informed Consent:    Anesthetic plan and risks discussed with patient.

## 2020-08-20 NOTE — PROGRESS NOTES
0700 Report received from JEN Key RN at bedside. Discussed POC  with pt and SO. Encouraged to ask questions or state needs at any time. Repositioned pt to left side with peanut ball    1040 SVE C/+1 by RN. Updated Dr Ramirez. Orders received to wait until MD can arrive. Dr Christensen on stand by. Pt comfortable at this time waiting to push    1125 Dr Ramirez and Dr Briseno at bedside. Pt positioned in stirrups. Pushing education given    1134 Delivery of viable, female infant. Infant delivered by Dr Ramirez.    1137- Intact placenta delivered. Pitocin bolus started. Orders received to administer cytotec 600mcg rectal and 400mcg oral prophylacticly

## 2020-08-20 NOTE — PROGRESS NOTES
S: Pt is feeling some nausea.    O:    Vitals:    08/20/20 0524 08/20/20 0544 08/20/20 0604 08/20/20 0644   BP: 113/62 111/66 131/77 121/70   Pulse: 69 86 87 87   SpO2:       Weight:       Height:               FHTs:  Baseline 135, + accels, - decels, mod variability        [place]: 2 contractions in 10 minutes, mod to palpation, pitocin @ 1 milliunits        SVE: 7/90/-2        Presentation: cephalic, no ear felt.     A/P:    1.  IUP @ 38w3d   2.  Cat 1 FHTs    3.  Continue pitocin augmentation.    4.  Frequent position changes.    Anuradha Lopez, CNM, APN  Dr. Demarco Lorenzo -- attending physician

## 2020-08-21 VITALS
TEMPERATURE: 97.6 F | HEIGHT: 66 IN | SYSTOLIC BLOOD PRESSURE: 101 MMHG | OXYGEN SATURATION: 98 % | RESPIRATION RATE: 16 BRPM | DIASTOLIC BLOOD PRESSURE: 68 MMHG | WEIGHT: 186 LBS | BODY MASS INDEX: 29.89 KG/M2 | HEART RATE: 80 BPM

## 2020-08-21 PROCEDURE — A9270 NON-COVERED ITEM OR SERVICE: HCPCS | Performed by: OBSTETRICS & GYNECOLOGY

## 2020-08-21 PROCEDURE — 700102 HCHG RX REV CODE 250 W/ 637 OVERRIDE(OP): Performed by: OBSTETRICS & GYNECOLOGY

## 2020-08-21 RX ORDER — IBUPROFEN 800 MG/1
800 TABLET ORAL EVERY 8 HOURS
Qty: 60 TAB | Refills: 0 | Status: SHIPPED | OUTPATIENT
Start: 2020-08-21 | End: 2021-07-27

## 2020-08-21 RX ORDER — ACETAMINOPHEN 500 MG
1000 TABLET ORAL EVERY 6 HOURS PRN
Qty: 60 TAB | Refills: 0 | Status: SHIPPED | OUTPATIENT
Start: 2020-08-21

## 2020-08-21 RX ORDER — PSEUDOEPHEDRINE HCL 30 MG
100 TABLET ORAL 2 TIMES DAILY PRN
Qty: 60 CAP | Refills: 0 | Status: SHIPPED | OUTPATIENT
Start: 2020-08-21 | End: 2021-07-27

## 2020-08-21 RX ORDER — FERROUS SULFATE 325(65) MG
325 TABLET ORAL DAILY
Qty: 30 TAB | Refills: 1 | Status: SHIPPED | OUTPATIENT
Start: 2020-08-21 | End: 2021-07-27

## 2020-08-21 RX ADMIN — IBUPROFEN 800 MG: 800 TABLET, FILM COATED ORAL at 05:48
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
## 2020-08-21 NOTE — NON-PROVIDER
Postpartum note    [facility] POSTPARTUM PROGRESS NOTE    PATIENT ID:  NAME:  Ronit Tripathi  MRN:               [id number]  YOB: 1995     25 y.o. female  at 38w3d PPD#1 s/p .    Subjective:   Breastfeeding: did initially, but has stopped  Bowel Movement: Yes  Voiding: Yes  Flatus: Yes  Pain Control: Adequate  Bleeding: Moderate amount of bloody lochia  Denies fever/chills, RUQ Pain    Objective:    Vitals:    20 1420 20 1800 20 2200 20 0600   BP: 118/73 104/66 (!) 95/66 101/68   Pulse: 84 86 79 80   Resp: 18 18 16 16   Temp: 37.9 °C (100.2 °F) 36.6 °C (97.8 °F) 36.5 °C (97.7 °F) 36.4 °C (97.6 °F)   TempSrc: Temporal Temporal Temporal Temporal   SpO2: 96% 95% 95% 98%   Weight:       Height:         General: No acute distress, resting comfortably in bed.  HEENT: normocephalic, nontraumatic, PERRLA, EOMI  Abdomen: soft; fundus not able to be appreciated due to patient discomfort with light palpation of umbilical area.    Recent Labs     206 20  1945   WBC 16.6* 19.9*   RBC 4.55 3.90*   HEMOGLOBIN 11.3* 9.9*   HEMATOCRIT 35.2* 30.5*   MCV 77.4* 78.2*   MCH 24.8* 25.4*   RDW 38.6 39.5   PLATELETCT 251 226   MPV 11.3 11.5   NEUTSPOLYS 70.40  --    LYMPHOCYTES 20.40*  --    MONOCYTES 7.20  --    EOSINOPHILS 0.60  --    BASOPHILS 0.40  --      No results for input(s): SODIUM, POTASSIUM, CHLORIDE, CO2, GLUCOSE, BUN, CPKTOTAL in the last 72 hours.    Current Meds:   Current Facility-Administered Medications   Medication Dose Frequency Provider Last Rate Last Dose   • LR infusion   PRN AUDRA Rooney.O.       • tetanus-dipth-acell pertussis (Tdap) inj 0.5 mL  0.5 mL Once PRN Albina Sanchez D.O.       • measles, mumps and rubella vaccine (MMR) injection 0.5 mL  0.5 mL Once PRN Albina Sanchez D.O.       • docusate sodium (COLACE) capsule 100 mg  100 mg BID PRN AUDRA Rooney.O.       • PRN oxytocin (PITOCIN) (20 Units/1000 mL) PRN for excessive  uterine bleeding - See Admin Instr  125-999 mL/hr Once PRN Albina Sanchez, D.O.       • methylergonovine (METHERGINE) injection 0.2 mg  0.2 mg Once PRN Albina Sanchez D.O.       • acetaminophen (TYLENOL) tablet 1,000 mg  1,000 mg Q6HRS PRN Albina Sanchez D.O.       • ibuprofen (MOTRIN) tablet 800 mg  800 mg Q8HR Albina Sanchez D.O.   800 mg at 20 0548   • ondansetron (ZOFRAN ODT) dispertab 4 mg  4 mg Q6HRS PRN Anuradha Lopez, A.P.R.N.        Or   • ondansetron (ZOFRAN) syringe/vial injection 4 mg  4 mg Q6HRS PRN Anuradha Lopez, A.P.R.N.   4 mg at 20 0155   • oxytocin (PITOCIN) infusion (for postpartum)   mL/hr Continuous Anuradha Lopez, A.P.R.N. 125 mL/hr at 20 1218 125 mL/hr at 20 1218   Last reviewed on 2020 11:44 PM by Nora Key R.N.       Assessment:  25 y.o. female  at 38w3d PPD#1 s/p  with adequate pain control and moderate bloody lochia. She has some abdominal tenderness to light palpation in the umbilical area but has been able to void and have bowel movements with one isolated episode of diarrhea.  -Hb 11.3 antepartum to 9.9 postpartum.  -Rubella status: vaccinated yesterday  -GBS: negative  -Rh: positive    Plan:   1. Continue to monitor vitals and postpartum course.  2. Continue pain management with ibuprofen prn.  3. Disposition: Will likely discharge home later today with cholase, iron, tylenol, and prenatal vitamins.    Brit Cabral MS3

## 2020-08-21 NOTE — PROGRESS NOTES
Pt discharged at approximately 1430 via wheelchair with hospital escort. Infant placed in carseat by parent and checked by RN. PT discharge instructions provided approximately 1200 no prescriptions ordered by MD. Checked armbands. Clamp and cuddles removed. No further questions at this time.

## 2020-08-21 NOTE — CARE PLAN
Problem: Altered physiologic condition related to immediate post-delivery state and potential for bleeding/hemorrhage  Goal: Patient physiologically stable as evidenced by normal lochia, palpable uterine involution and vital signs within normal limits  Outcome: PROGRESSING AS EXPECTED  Intervention: Perform physical assessment and obtain vital signs on patient as directed in Intrapartum/Postpartum Standard of Care in Policy and Procedure manual  Note: Fundus firm, lochia light on assessment without clots expressed, pt performing flory care independently     Problem: Alteration in comfort related to episiotomy, vaginal repair and/or after birth pains  Goal: Patient is able to ambulate, care for self and infant  Outcome: PROGRESSING AS EXPECTED  Intervention: Assess 0-10 pain level with vital signs  Note: Pt reports adequate pain relief with ibuprofen and heat packs, is able to ambulate and care for self and infant without difficulty

## 2020-08-21 NOTE — DISCHARGE SUMMARY
[facility]  NORMAL SPONTANEOUS VAGINAL DISCHARGE SUMMARY    PATIENT ID:  NAME:  Ronit Tripathi  MRN:               [id number]  YOB: 1995    DATE OF ADMISSION: 8/19/2020    DATE OF DISCHARGE: 8/21/2020     ADMITTING DIAGNOSIS:  1. Intrauterine pregnancy at 38w3d.      DISCHARGE DIAGNOSIS:  1. s/p vaginal delivery        HOSPITAL COURSE: This is a 25 y.o. year old female admitted at 38w3d who presented with positive contractions, negative LOF, negative vaginal bleeding, positive FM and in active labor. Pt was 6-7 cm dilated, 80% effaced and at  -3 station on sterile vaginal exam. Pregnancy was complicated due to history of miscarriages, and PPH. The patient had a good labor pattern after admission and proceeded to deliver a viable female infant weighing  7 lbs and 14.8 oz. Infants Apgars scores were 8 and 9 at one and five minutes. The patients postpartum course was uncomplicated and she was discharged home in stable condition on postpartum day #1.    PROCEDURES PERFORMED: Normal spontaneous vaginal delivery     COMPLICATIONS: none    DIET: regular    ACTIVITY: No intercourse and nothing inserted into the vagina for 5 weeks.    MEDICATIONS:  No current outpatient medications on file.         Objective:    Vitals:    08/20/20 1420 08/20/20 1800 08/20/20 2200 08/21/20 0600   BP: 118/73 104/66 (!) 95/66 101/68   Pulse: 84 86 79 80   Resp: 18 18 16 16   Temp: 37.9 °C (100.2 °F) 36.6 °C (97.8 °F) 36.5 °C (97.7 °F) 36.4 °C (97.6 °F)   TempSrc: Temporal Temporal Temporal Temporal   SpO2: 96% 95% 95% 98%   Weight:       Height:         General: No acute distress, resting comfortably in bed.  HEENT: normocephalic, nontraumatic, PERRLA, EOMI  Abdomen: soft, mildly tender, fundus firm, +BS      Recent Labs     08/19/20  2326 08/20/20  1945   WBC 16.6* 19.9*   RBC 4.55 3.90*   HEMOGLOBIN 11.3* 9.9*   HEMATOCRIT 35.2* 30.5*   MCV 77.4* 78.2*   MCH 24.8* 25.4*   RDW 38.6 39.5   PLATELETCT 251 226   MPV 11.3 11.5    NEUTSPOLYS 70.40  --    LYMPHOCYTES 20.40*  --    MONOCYTES 7.20  --    EOSINOPHILS 0.60  --    BASOPHILS 0.40  --          - Follow up in 5-6 weeks  - No intercourse and nothing inserted into the vagina for 5 weeks  - Continue taking prenatal   - Take iron supplements daily  - Tylenol and Motrin as needed for pain  - Colace as needed for constipation     - Return precautions: Increase vaginal bleeding, clots, chest pain/SOB, Increase edema especially in one limb, Severe abdominal pain, Fever > 100.4 JACQUES Briseno MD  PGY-1  Family Medicine Resident

## 2020-08-21 NOTE — DISCHARGE INSTRUCTIONS
POSTPARTUM DISCHARGE INSTRUCTIONS FOR MOM    YOB: 1995   Age: 25 y.o.               Admit Date: 2020     Discharge Date: 2020  Attending Doctor:  KHOA Turner*                  Allergies:  Patient has no known allergies.    Discharged to home by car. Discharged via wheelchair, hospital escort: Yes.  Special equipment needed: Not Applicable  Belongings with: Personal  Be sure to schedule a follow-up appointment with your primary care doctor or any specialists as instructed.     Discharge Plan:   Diet Plan: Discussed  Activity Level: Discussed  Smoking Cessation Offered: Patient Refused  Confirmed Follow up Appointment: Patient to Call and Schedule Appointment  Confirmed Symptoms Management: Discussed  Medication Reconciliation Updated: Yes    REASONS TO CALL YOUR OBSTETRICIAN:  1.   Persistent fever or shaking chills (Temperature higher than 100.4)  2.   Heavy bleeding (soaking more than 1 pad per hour); Passing clots  3.   Foul odor from vagina  4.   Mastitis (Breast infection; breast pain, chills, fever, redness)  5.   Urinary pain, burning or frequency  6.   Episiotomy infection  7.   Abdominal incision infection  8.   Severe depression longer than 24 hours    HAND WASHING  · Prior to handling the baby.  · Before breastfeeding or bottle feeding baby.  · After using the bathroom or changing the baby's diaper.    WOUND CARE  Ask your physician for additional care instructions.  In general:    ·  Incision:      · Keep clean and dry.    · Do NOT lift anything heavier than your baby for up to 6 weeks.    · There should not be any opening or pus.      VAGINAL CARE  · Nothing inside vagina for 6 weeks: no sexual intercourse, tampons or douching.  · Bleeding may continue for 2-4 weeks.  Amount may vary.    · Call your physician for heavy bleeding which means soaking more than 1 pad per hour    BIRTH CONTROL  · It is possible to become pregnant at any time after delivery and while  "breastfeeding.  · Plan to discuss a method of birth control with your physician at your follow up visit. visit.    DIET AND ELIMINATION  · Eating more fiber (bran cereal, fruits, and vegetables) and drinking plenty of fluids will help to avoid constipation.  · Urinary frequency after childbirth is normal.    POSTPARTUM BLUES  During the first few days after birth, you may experience a sense of the \"blues\" which may include impatience, irritability or even crying.  These feeling come and go quickly.  However, as many as 1 in 10 women experience emotional symptoms known as postpartum depression.    Postpartum depression:  May start as early as the second or third day after delivery or take several weeks or months to develop.  Symptoms of \"blues\" are present, but are more intense:  Crying spells; loss of appetite; feelings of hopelessness or loss of control; fear of touching the baby; over concern or no concern at all about the baby; little or no concern about your own appearance/caring for yourself; and/or inability to sleep or excessive sleeping.  Contact your physician if you are experiencing any of these symptoms.    Crisis Hotline:  · [place] Crisis Hotline:  0-993-MNDBEOO  Or 1-532.931.9347  · [place] Crisis Hotline:  1-135.461.3710  Or 423-056-3236    PREVENTING SHAKEN BABY:  If you are angry or stressed, PUT THE BABY IN THE CRIB, step away, take some deep breaths, and wait until you are calm to care for the baby.  DO NOT SHAKE THE BABY.  You are not alone, call a supporter for help.    · Crisis Call Center 24/7 crisis line 727-323-6192 or 1-822.501.5488  · You can also text them, text \"ANSWER\" to 909462    QUIT SMOKING/TOBACCO USE:  I understand the use of any tobacco products increases my chance of suffering from future heart disease and could cause other illnesses which may shorten my life. Quitting the use of tobacco products is the single most important thing I can do to improve my health. For further " information on smoking / tobacco cessation call a Toll Free Quit Line at 1-109.833.5467 (*[employer] [place]) or 1-887.407.9855 (American Lung Association) or you can access the web based program at www.lungusa.org.    · [place] Tobacco Users Help Line:  (106) 938-2639       Toll Free: 1-343.595.2781  · Quit Tobacco Program [facility] (904)290-5134    DEPRESSION / SUICIDE RISK:  As you are discharged from this [facility], it is important to learn how to keep safe from harming yourself.    Recognize the warning signs:  · Abrupt changes in personality, positive or negative- including increase in energy   · Giving away possessions  · Change in eating patterns- significant weight changes-  positive or negative  · Change in sleeping patterns- unable to sleep or sleeping all the time   · Unwillingness or inability to communicate  · Depression  · Unusual sadness, discouragement and loneliness  · Talk of wanting to die  · Neglect of personal appearance   · Rebelliousness- reckless behavior  · Withdrawal from people/activities they love  · Confusion- inability to concentrate     If you or a loved one observes any of these behaviors or has concerns about self-harm, here's what you can do:  · Talk about it- your feelings and reasons for harming yourself  · Remove any means that you might use to hurt yourself (examples: pills, rope, extension cords, firearm)  · Get professional help from the community (Mental Health, Substance Abuse, psychological counseling)  · Do not be alone:Call your Safe Contact- someone whom you trust who will be there for you.  · Call your local CRISIS HOTLINE 767-0748 or 777-826-4203  · Call your local Children's Mobile Crisis Response Team [place] (983) 539-6019 or www.[employer]  · Call the toll free National Suicide Prevention Hotlines   · National Suicide Prevention Lifeline 389-821-XWUU (3700)  · National Hope Line Network 800-SUICIDE  (583-9914)    DISCHARGE SURVEY:  Thank you for choosing [facility].  We hope we provided you with very good care.  You may be receiving a survey in the mail.  Please fill it out.  Your opinion is valuable to us.    ADDITIONAL EDUCATIONAL MATERIALS GIVEN TO PATIENT:        My signature on this form indicates that:  1.  I have reviewed and understand the above information  2.  My questions regarding this information have been answered to my satisfaction.  3.  I have formulated a plan with my discharge nurse to obtain my prescribed medication for home.

## 2021-07-27 ENCOUNTER — OFFICE VISIT (OUTPATIENT)
Dept: URGENT CARE | Facility: PHYSICIAN GROUP | Age: 26
End: 2021-07-27

## 2021-07-27 VITALS
OXYGEN SATURATION: 96 % | HEART RATE: 77 BPM | TEMPERATURE: 98.1 F | BODY MASS INDEX: 25.71 KG/M2 | HEIGHT: 66 IN | SYSTOLIC BLOOD PRESSURE: 110 MMHG | DIASTOLIC BLOOD PRESSURE: 78 MMHG | RESPIRATION RATE: 16 BRPM | WEIGHT: 160 LBS

## 2021-07-27 DIAGNOSIS — Z20.818 EXPOSURE TO STREP THROAT: ICD-10-CM

## 2021-07-27 DIAGNOSIS — J02.9 SORE THROAT: ICD-10-CM

## 2021-07-27 DIAGNOSIS — J03.00 STREP TONSILLITIS: ICD-10-CM

## 2021-07-27 LAB
INT CON NEG: NORMAL
INT CON POS: NORMAL
S PYO AG THROAT QL: NORMAL

## 2021-07-27 PROCEDURE — 87880 STREP A ASSAY W/OPTIC: CPT | Performed by: NURSE PRACTITIONER

## 2021-07-27 PROCEDURE — 99213 OFFICE O/P EST LOW 20 MIN: CPT | Performed by: NURSE PRACTITIONER

## 2021-07-27 RX ORDER — DEXAMETHASONE SODIUM PHOSPHATE 4 MG/ML
4 INJECTION, SOLUTION INTRA-ARTICULAR; INTRALESIONAL; INTRAMUSCULAR; INTRAVENOUS; SOFT TISSUE ONCE
Status: COMPLETED | OUTPATIENT
Start: 2021-07-27 | End: 2021-07-27

## 2021-07-27 RX ORDER — AMOXICILLIN 500 MG/1
500 CAPSULE ORAL 2 TIMES DAILY
Qty: 20 CAPSULE | Refills: 0 | Status: SHIPPED | OUTPATIENT
Start: 2021-07-27 | End: 2021-08-06

## 2021-07-27 RX ADMIN — DEXAMETHASONE SODIUM PHOSPHATE 4 MG: 4 INJECTION, SOLUTION INTRA-ARTICULAR; INTRALESIONAL; INTRAMUSCULAR; INTRAVENOUS; SOFT TISSUE at 10:35

## 2021-07-27 ASSESSMENT — ENCOUNTER SYMPTOMS
NAUSEA: 0
VOMITING: 0
COUGH: 0
CHILLS: 0
MYALGIAS: 0
WHEEZING: 0
DIARRHEA: 0
SORE THROAT: 1
EYE REDNESS: 0
ABDOMINAL PAIN: 0
HEADACHES: 0
CONSTIPATION: 0
FEVER: 0
DIZZINESS: 0
WEAKNESS: 0
SHORTNESS OF BREATH: 0
EYE DISCHARGE: 0

## 2021-07-27 ASSESSMENT — FIBROSIS 4 INDEX: FIB4 SCORE: 0.78

## 2021-07-27 NOTE — PROGRESS NOTES
Subjective:      Ronit Tripathi is a 26 y.o. female who presents with Pharyngitis (x3 days unable to eat )            HPI  Sore throat x 3 days. Exposure to Strep. States her children had Strep last week. Hurts to swallow. Tylenol.     PMH:  has a past medical history of Anemia, Blood transfusion without reported diagnosis, Chalazion of right upper eyelid (3/7/2017), Hypertension, Nausea and vomiting in pregnancy, and Pregnant. She also has no past medical history of Addisons disease (HCC), Adrenal disorder (HCC), Allergy, Anxiety, Arrhythmia, Arthritis, ASTHMA, Blood transfusion, Cancer (HCC), CATARACT, CHF (congestive heart failure) ([facility]), Clotting disorder (HCC), COPD, Cushings syndrome ([facility]), Depression, Diabetes, Diabetic neuropathy ([facility]), EMPHYSEMA, Glaucoma, Goiter, Head ache, Headache(784.0), Headache, classical migraine, Heart attack ([facility]), Heart murmur, HIV (human immunodeficiency virus infection), Hyperlipidemia, IBD (inflammatory bowel disease), Kidney disease, Meningitis, Migraine, Muscle disorder, OSTEOPOROSIS, Osteoporosis, unspecified, Parathyroid disorder ([facility]), Pituitary disease ([facility]), Seizure ([facility]), Sickle cell disease ([facility]), Stroke ([facility]), Substance abuse ([facility]), Thyroid disease, Tuberculosis, Ulcer, or Urinary tract infection, site not specified.  MEDS:   Current Outpatient Medications:   •  acetaminophen (TYLENOL) 500 MG Tab, Take 2 Tabs by mouth every 6 hours as needed for Mild Pain., Disp: 60 Tab, Rfl: 0  •  docusate sodium 100 MG Cap, Take 1 cap by mouth 2 times a day as needed for Constipation. (Patient not taking: Reported on 7/27/2021), Disp: 60 Cap, Rfl: 0  •  ibuprofen (MOTRIN) 800 MG Tab, Take 1 Tab by mouth every 8 hours. (Patient not taking: Reported on 7/27/2021), Disp: 60 Tab, Rfl: 0  •  ferrous sulfate 325 (65 Fe) MG tablet, Take 1 Tab by mouth every day. (Patient not taking: Reported on 7/27/2021), Disp: 30 Tab, Rfl: 1  •  Prenatal MV-Min-Fe Fum-FA-DHA (PRENATAL 1 PO), Take   "by mouth. (Patient not taking: Reported on 7/27/2021), Disp: , Rfl:   ALLERGIES: No Known Allergies  SURGHX: History reviewed. No pertinent surgical history.  SOCHX:  reports that she has been smoking. She has a 0.50 pack-year smoking history. She has never used smokeless tobacco. She reports previous drug use. Drug: Marijuana. She reports that she does not drink alcohol.  FH: Family history was reviewed, no pertinent findings to report    Review of Systems   Constitutional: Negative for chills, fever and malaise/fatigue.   HENT: Positive for sore throat. Negative for congestion and ear pain.    Eyes: Negative for discharge and redness.   Respiratory: Negative for cough, shortness of breath and wheezing.    Gastrointestinal: Negative for abdominal pain, constipation, diarrhea, nausea and vomiting.   Musculoskeletal: Negative for myalgias.   Skin: Negative for itching and rash.   Neurological: Negative for dizziness, weakness and headaches.   Endo/Heme/Allergies: Negative for environmental allergies.   All other systems reviewed and are negative.         Objective:     /78 (BP Location: Right arm, Patient Position: Sitting, BP Cuff Size: Adult)   Pulse 77   Temp 36.7 °C (98.1 °F) (Temporal)   Resp 16   Ht 1.676 m (5' 6\")   Wt 72.6 kg (160 lb)   SpO2 96%   BMI 25.82 kg/m²      Physical Exam  Vitals reviewed.   Constitutional:       General: She is awake. She is not in acute distress.     Appearance: Normal appearance. She is well-developed. She is not ill-appearing, toxic-appearing or diaphoretic.   HENT:      Head: Normocephalic.      Right Ear: Tympanic membrane, ear canal and external ear normal.      Left Ear: Tympanic membrane, ear canal and external ear normal.      Nose: Nose normal.      Mouth/Throat:      Mouth: Mucous membranes are moist.      Pharynx: Uvula midline. Pharyngeal swelling and posterior oropharyngeal erythema present. No oropharyngeal exudate or uvula swelling.      Tonsils: No " tonsillar exudate or tonsillar abscesses. 3+ on the right. 3+ on the left.   Cardiovascular:      Rate and Rhythm: Normal rate.   Pulmonary:      Effort: Pulmonary effort is normal.   Neurological:      Mental Status: She is alert and oriented to person, place, and time.   Psychiatric:         Behavior: Behavior normal. Behavior is cooperative.                        Assessment/Plan:        1. Sore throat    - POCT Rapid Strep A    2. Exposure to strep throat    - POCT Rapid Strep A    3. Strep tonsillitis    - amoxicillin (AMOXIL) 500 MG Cap; Take 1 capsule by mouth 2 times a day for 10 days.  Dispense: 20 capsule; Refill: 0  - dexamethasone (DECADRON) injection 4 mg      -Increase water intake  -May use over the counter Ibuprofen/Tylenol as needed for any fever, body aches or throat pain  -May use throat lozenges for throat discomfort as needed   -May gargle with salt water up to 4x/day as needed for throat discomfort (1 tsp salt dissolved in 1 cup warm water)  -May drink smoothies for nutrition if too painful to swallow solid foods  -Monitor for any sinus pain/pressure with sinus congestion with thick mucus production, sinus headache, cough, shortness of breath, fever- need re-evaluation

## 2022-07-06 ENCOUNTER — OFFICE VISIT (OUTPATIENT)
Dept: INTERNAL MEDICINE | Facility: OTHER | Age: 27
End: 2022-07-06
Payer: MEDICAID

## 2022-07-06 VITALS
DIASTOLIC BLOOD PRESSURE: 87 MMHG | SYSTOLIC BLOOD PRESSURE: 129 MMHG | TEMPERATURE: 97.9 F | WEIGHT: 154 LBS | HEART RATE: 78 BPM | OXYGEN SATURATION: 98 % | BODY MASS INDEX: 25.66 KG/M2 | HEIGHT: 65 IN

## 2022-07-06 DIAGNOSIS — F32.9 REACTIVE DEPRESSION: ICD-10-CM

## 2022-07-06 DIAGNOSIS — D72.829 LEUKOCYTOSIS, UNSPECIFIED TYPE: ICD-10-CM

## 2022-07-06 DIAGNOSIS — F32.2 CURRENT SEVERE EPISODE OF MAJOR DEPRESSIVE DISORDER WITHOUT PSYCHOTIC FEATURES, UNSPECIFIED WHETHER RECURRENT (HCC): ICD-10-CM

## 2022-07-06 DIAGNOSIS — E87.6 HYPOKALEMIA: ICD-10-CM

## 2022-07-06 DIAGNOSIS — Z76.89 ENCOUNTER TO ESTABLISH CARE: ICD-10-CM

## 2022-07-06 DIAGNOSIS — G47.00 INSOMNIA, UNSPECIFIED TYPE: ICD-10-CM

## 2022-07-06 DIAGNOSIS — F41.1 GENERALIZED ANXIETY DISORDER: ICD-10-CM

## 2022-07-06 PROCEDURE — 99204 OFFICE O/P NEW MOD 45 MIN: CPT | Mod: GC | Performed by: INTERNAL MEDICINE

## 2022-07-06 RX ORDER — MIRTAZAPINE 15 MG/1
15 TABLET, FILM COATED ORAL NIGHTLY
Qty: 30 TABLET | Refills: 1 | Status: SHIPPED | OUTPATIENT
Start: 2022-07-06

## 2022-07-06 RX ORDER — HYDROXYZINE HYDROCHLORIDE 25 MG/1
25 TABLET, FILM COATED ORAL 3 TIMES DAILY PRN
Qty: 30 TABLET | Refills: 1 | Status: SHIPPED | OUTPATIENT
Start: 2022-07-06

## 2022-07-06 ASSESSMENT — PATIENT HEALTH QUESTIONNAIRE - PHQ9
CLINICAL INTERPRETATION OF PHQ2 SCORE: 4
5. POOR APPETITE OR OVEREATING: 3 - NEARLY EVERY DAY
SUM OF ALL RESPONSES TO PHQ QUESTIONS 1-9: 14

## 2022-07-06 ASSESSMENT — FIBROSIS 4 INDEX: FIB4 SCORE: 0.82

## 2022-07-06 NOTE — PATIENT INSTRUCTIONS
Thank you for visiting today!    Please follow-up in 2 weeks     Please follow-up on referrals and schedule an appointment with psychology (for counseling)    Please get lab work done at least 5 days before next visit.    A new medicine called mirtazapine has been ordered for you, please take it every evening to help with depression and sleep    Another medicine called hydroxyzine has also been ordered for you, take it as needed during the day for anxiety (maximum of three times a day)      If you have any questions or concerns please feel free to contact us at [phone].  If you feel like you are experiencing a medical emergency please seek immediate medical attention at an urgent care or in the emergency department.

## 2022-07-06 NOTE — PROGRESS NOTES
Ronit Tripathi is a 27 y.o. female who presents today with the following:    CC: Establish Care with Primary Care Physician     HPI:      1. Reactive depression  2. Current severe episode of major depressive disorder without psychotic features, unspecified whether recurrent (HCC)  3. Insomnia  4.  Generalized anxiety disorder  Patient describes that she has had major stressors in her life recently.  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  She has not been able to sleep at all for the last 2 weeks.    Describes she does have a television in the room, however she does not watch it in the evenings.  Does not use phone in the evenings.  Does not drink coffee/tea/caffeine, no energy drinks.  Sleep hygiene is good.  The room is quiet, dark, cold.  She is in the bed by 8:30-9 PM    Endorses poor appetite, difficulty concentrating on things.  Feels like her mind is racing all the time.  Has anxiety when around a lot of people, in a crowded room.    They have tried over-the-counter melatonin to 4 tablets that did not help with sleep either.    PHQ 9 scale with a score of 16  SAMEER scale with a score of 14  No active thoughts of suicide or homicide    5.  Smoking tobacco  Positive history of smoking, 2 packs/day for the last 1-1/2 years.  Describes over the last 2 weeks she has cut down her cigarettes to 1-2 cigarettes a day    6.  Drug use  7.  Alcohol use  Denies any current drug use.  Does not drink alcohol, notable occasionally.  Tried marijuana to help calm her down which did not help either      ROS:        General: No fevers, chills, night sweats, weight loss or gain  HEENT: No hearing changes, vision changes, eye pain, ear pain, nasal discharge, sore throat  Neck: No swelling in neck  Pulmonary: No shortness of breath, cough, sputum, or hemoptysis  Cardiovascular: No chest pain, palpitations, or LE swelling  GI: No nausea, vomiting, diarrhea, constipation, abdominal pain, hematochezia or melena  : No dysuria or frequency  Neuro: No focal weakness, no general weakness, no headaches, no lightheadedness, no dizziness  Psych: Positive for anxiety and depression as mentioned in HPI    Past Medical History:   Diagnosis Date   • Anemia    • Blood transfusion without reported diagnosis     2017   • Chalazion of right upper eyelid 3/7/2017   • Hypertension    • Major depressive disorder 7/6/2022   • Nausea and vomiting in pregnancy     2010,2014   • Pregnant        History reviewed. No pertinent surgical history.    Family History   Problem Relation Age of Onset   • No Known Problems Mother    • No Known Problems Father    • No Known Problems Sister    • No Known Problems Brother        Social History     Tobacco Use   • Smoking status: Current Every Day Smoker     Packs/day: 0.50     Years: 1.00     Pack years: 0.50   • Smokeless tobacco: Never Used   • Tobacco comment: Stopped smoking once she found out she was pregnant   Vaping Use   • Vaping Use: Never used   Substance Use Topics   • Alcohol use: No   • Drug use: Not Currently     Types: Marijuana       Current Outpatient Medications   Medication Sig Dispense Refill   • mirtazapine (REMERON) 15 MG Tab Take 1 Tablet by mouth every evening. 30 Tablet 1   • hydrOXYzine HCl (ATARAX) 25 MG Tab Take 1 Tablet by mouth 3 times a day as needed for Anxiety. 30 Tablet 1   • acetaminophen (TYLENOL) 500 MG Tab Take 2 Tabs by mouth every 6 hours as needed for Mild Pain. (Patient not taking: Reported on 7/6/2022) 60 Tab 0     No current facility-administered medications for this  "visit.       Physical Exam:  /87 (BP Location: Left arm, Patient Position: Sitting, BP Cuff Size: Adult)   Pulse 78   Temp 36.6 °C (97.9 °F) (Temporal)   Ht 1.64 m (5' 4.57\")   Wt 69.9 kg (154 lb)   SpO2 98%   BMI 25.97 kg/m²   General: Well developed, well nourished female, in no distress.  HEENT: NC/AT, PERRL, EOMI  Neck: Supple  CV:RRR, no murmurs or  gallops  Pulm: LCAB   GI: Normal bowel sounds, abdomen soft, nontender, nondistended to deep or light palpation in all 4 quadrants, no HSM.  MSK: Radial and dorsalis pedis pulses 2+ and equal bilaterally, respectively.  Strength 5 out of 5 in upper and lower extremities.  No lower extremity edema  Neuro: Patient is alert and oriented x3, no focal deficits  Psych: Depressed affect      Assessment and Plan:     1. Reactive depression  2. Current severe episode of major depressive disorder without psychotic features, unspecified whether recurrent (HCC)  3. Insomnia    PHQ score of 16 without any active suicidal or homicidal ideation  Mirtazapine 15 mg every evening that will help both with depression as well as insomnia  Early follow-up planned in a week-10 days to assess response and adjust dose of mirtazapine as appropriate  - Patient has been identified as having a positive depression screening. Appropriate orders and counseling have been given.  - Referral to Psychology  - TSH WITH REFLEX TO FT[facility]; Future      3. Generalized anxiety disorder  SAMEER score of 14  -Hydroxyzine 3 times daily as needed to help with anxiety as well as sleep  -Referral to psychology  -Early follow-up in a week to 10 days to assess response      4.  Encounter to establish care  Patient cleared with recent history of colitis, prescribed oxycodone that she stopped taking and presented to the ED with possible withdrawal?  Prior CMP with hypokalemia and CBC with mild leukocytosis  Plan:  -Repeat CBC along with BMP to assess for electrolyte levels  -Counseled on potassium rich diet " (bananas etc)  -Patient due for COVID-vaccine, not willing to discuss at this visit as she is more concerned about insomnia and depression.  We will discuss again at next visit        Return in about 2 weeks (around 7/20/2022).    Patient Instructions   Thank you for visiting today!    Please follow-up in 2 weeks     Please follow-up on referrals and schedule an appointment with psychology (for counseling)    Please get lab work done at least 5 days before next visit.    A new medicine called mirtazapine has been ordered for you, please take it every evening to help with depression and sleep    Another medicine called hydroxyzine has also been ordered for you, take it as needed during the day for anxiety (maximum of three times a day)      If you have any questions or concerns please feel free to contact us at [phone].  If you feel like you are experiencing a medical emergency please seek immediate medical attention at an urgent care or in the emergency department.        Signed by: Medardo Mann M.D.

## 2023-04-28 ENCOUNTER — NON-PROVIDER VISIT (OUTPATIENT)
Dept: URGENT CARE | Facility: PHYSICIAN GROUP | Age: 28
End: 2023-04-28

## 2023-04-28 DIAGNOSIS — Z02.1 PRE-EMPLOYMENT DRUG SCREENING: ICD-10-CM

## 2023-04-28 LAB
AMP AMPHETAMINE: NORMAL
COC COCAINE: NORMAL
INT CON NEG: NEGATIVE
INT CON POS: POSITIVE
MET METHAMPHETAMINES: NORMAL
OPI OPIATES: NORMAL
PCP PHENCYCLIDINE: NORMAL
POC DRUG COMMENT 753798-OCCUPATIONAL HEALTH: NEGATIVE
THC: NORMAL

## 2023-04-28 PROCEDURE — 80305 DRUG TEST PRSMV DIR OPT OBS: CPT | Performed by: NURSE PRACTITIONER

## 2023-10-24 ENCOUNTER — HOSPITAL ENCOUNTER (EMERGENCY)
Facility: MEDICAL CENTER | Age: 28
End: 2023-10-24
Attending: STUDENT IN AN ORGANIZED HEALTH CARE EDUCATION/TRAINING PROGRAM
Payer: MEDICAID

## 2023-10-24 VITALS
HEART RATE: 70 BPM | RESPIRATION RATE: 17 BRPM | OXYGEN SATURATION: 95 % | HEIGHT: 65 IN | WEIGHT: 149.69 LBS | TEMPERATURE: 98.7 F | BODY MASS INDEX: 24.94 KG/M2 | DIASTOLIC BLOOD PRESSURE: 108 MMHG | SYSTOLIC BLOOD PRESSURE: 173 MMHG

## 2023-10-24 DIAGNOSIS — K04.7 DENTAL INFECTION: ICD-10-CM

## 2023-10-24 LAB — HCG UR QL: NEGATIVE

## 2023-10-24 PROCEDURE — 99284 EMERGENCY DEPT VISIT MOD MDM: CPT

## 2023-10-24 PROCEDURE — 700102 HCHG RX REV CODE 250 W/ 637 OVERRIDE(OP): Mod: UD | Performed by: STUDENT IN AN ORGANIZED HEALTH CARE EDUCATION/TRAINING PROGRAM

## 2023-10-24 PROCEDURE — 96372 THER/PROPH/DIAG INJ SC/IM: CPT

## 2023-10-24 PROCEDURE — 700111 HCHG RX REV CODE 636 W/ 250 OVERRIDE (IP): Mod: UD | Performed by: STUDENT IN AN ORGANIZED HEALTH CARE EDUCATION/TRAINING PROGRAM

## 2023-10-24 PROCEDURE — 81025 URINE PREGNANCY TEST: CPT

## 2023-10-24 PROCEDURE — A9270 NON-COVERED ITEM OR SERVICE: HCPCS | Mod: UD | Performed by: STUDENT IN AN ORGANIZED HEALTH CARE EDUCATION/TRAINING PROGRAM

## 2023-10-24 RX ORDER — AMOXICILLIN AND CLAVULANATE POTASSIUM 875; 125 MG/1; MG/1
1 TABLET, FILM COATED ORAL ONCE
Status: COMPLETED | OUTPATIENT
Start: 2023-10-24 | End: 2023-10-24

## 2023-10-24 RX ORDER — OXYCODONE HYDROCHLORIDE AND ACETAMINOPHEN 5; 325 MG/1; MG/1
1 TABLET ORAL ONCE
Status: COMPLETED | OUTPATIENT
Start: 2023-10-24 | End: 2023-10-24

## 2023-10-24 RX ORDER — KETOROLAC TROMETHAMINE 30 MG/ML
15 INJECTION, SOLUTION INTRAMUSCULAR; INTRAVENOUS ONCE
Status: COMPLETED | OUTPATIENT
Start: 2023-10-24 | End: 2023-10-24

## 2023-10-24 RX ORDER — NAPROXEN 375 MG/1
375 TABLET ORAL 2 TIMES DAILY WITH MEALS
Qty: 14 TABLET | Refills: 0 | Status: SHIPPED | OUTPATIENT
Start: 2023-10-24 | End: 2023-10-31

## 2023-10-24 RX ORDER — AMOXICILLIN AND CLAVULANATE POTASSIUM 875; 125 MG/1; MG/1
1 TABLET, FILM COATED ORAL 2 TIMES DAILY
Qty: 20 TABLET | Refills: 0 | Status: ACTIVE | OUTPATIENT
Start: 2023-10-24 | End: 2023-11-03

## 2023-10-24 RX ADMIN — OXYCODONE AND ACETAMINOPHEN 1 TABLET: 5; 325 TABLET ORAL at 20:44

## 2023-10-24 RX ADMIN — KETOROLAC TROMETHAMINE 15 MG: 30 INJECTION, SOLUTION INTRAMUSCULAR; INTRAVENOUS at 21:40

## 2023-10-24 RX ADMIN — AMOXICILLIN AND CLAVULANATE POTASSIUM 1 TABLET: 875; 125 TABLET, FILM COATED ORAL at 22:24

## 2023-10-24 ASSESSMENT — FIBROSIS 4 INDEX: FIB4 SCORE: 0.69

## 2023-10-24 ASSESSMENT — PAIN DESCRIPTION - PAIN TYPE: TYPE: ACUTE PAIN

## 2023-10-25 NOTE — ED NOTES
Checked on bed, breathing is even with unlabored respirations. Denied any new complaints. No current needs identified.  Gurney in low position, side rail up for pt safety. Call light within reach.

## 2023-10-25 NOTE — ED NOTES
Medicated pt per MAR.  Pt provided discharge instructions, and prescription. Pt verbalized understanding of all instructions. Pt ambulatory to lobby w/ steady gait.

## 2023-10-25 NOTE — ED PROVIDER NOTES
ED Provider Note    CHIEF COMPLAINT  Chief Complaint   Patient presents with    Dental Pain       EXTERNAL RECORDS REVIEWED  External ED Note ER visit on 7/2/2022 for opiate withdrawal    HPI/ROS  LIMITATION TO HISTORY   Select: : None  OUTSIDE HISTORIAN(S):      Ronit Tripathi is a 28 y.o. female who presents with severe pain to the left upper posterior molar.  Patient reports that she has had a dental fracture for several months but that the pain became very severe today.  Patient reports intractable severe sharp pain for the past 4 hours.  Patient denies fevers chills body aches or sweats.  Patient denies difficulty breathing or swallowing.  Patient has called to try and set up a dentistry appointment but unable to at this time due to no available appointments.    PAST MEDICAL HISTORY   has a past medical history of Anemia, Blood transfusion without reported diagnosis, Chalazion of right upper eyelid (3/7/2017), Hypertension, Major depressive disorder (7/6/2022), Nausea and vomiting in pregnancy, and Pregnant.    SURGICAL HISTORY  patient denies any surgical history    FAMILY HISTORY  Family History   Problem Relation Age of Onset    No Known Problems Mother     No Known Problems Father     No Known Problems Sister     No Known Problems Brother        SOCIAL HISTORY  Social History     Tobacco Use    Smoking status: Every Day     Current packs/day: 0.50     Average packs/day: 0.5 packs/day for 1 year (0.5 ttl pk-yrs)     Types: Cigarettes    Smokeless tobacco: Never    Tobacco comments:     Stopped smoking once she found out she was pregnant   Vaping Use    Vaping Use: Never used   Substance and Sexual Activity    Alcohol use: No    Drug use: Not Currently     Types: Marijuana    Sexual activity: Not Currently     Partners: Male     Comment: None        CURRENT MEDICATIONS  Home Medications    **Home medications have not yet been reviewed for this encounter**         ALLERGIES  No Known Allergies    PHYSICAL  "EXAM  VITAL SIGNS: BP (!) 173/108   Pulse 82   Temp 37.6 °C (99.7 °F) (Temporal)   Resp 16   Ht 1.651 m (5' 5\")   Wt 67.9 kg (149 lb 11.1 oz)   LMP 10/17/2023 (Exact Date)   SpO2 98%   BMI 24.91 kg/m²    Vitals and nursing note reviewed.   Constitutional:       Comments: Patient is lying in bed supine, pleasant, conversant, speaking in complete sentences, patient is tearful, holding her left face  HENT:      Head: Normocephalic and atraumatic.  No facial swelling.  No submandibular swelling, no neck swelling.  Patient has dental fracture at tooth #16 with some surrounding erythema and tenderness.  No posterior oropharyngeal erythema or exudates.  No sublingual elevation.  Eyes:      Extraocular Movements: Extraocular movements intact.      Conjunctiva/sclera: Conjunctivae normal.      Pupils: Pupils are equal, round, and reactive to light.   Cardiovascular:      Pulses: Normal pulses.      Comments: HR 82  Pulmonary:      Effort: Pulmonary effort is normal. No respiratory distress.   Musculoskeletal:         General: No swelling. Normal range of motion.      Cervical back: Normal range of motion. No rigidity.   Skin:     General: Skin is warm and dry.      Capillary Refill: Capillary refill takes less than 2 seconds.   Neurological:      Mental Status: Alert.       DIAGNOSTIC STUDIES / PROCEDURES    LABS  hCG negative      COURSE & MEDICAL DECISION MAKING      INITIAL ASSESSMENT, COURSE AND PLAN  Care Narrative: No evidence of airway compromise.  Patient is tolerating secretions no evidence of esophageal obstruction.  No sublingual elevation, no evidence of Brett angina.  No evidence of deep space abscess.  Patient is not septic.  We will focus on pain control, Percocet given for pain control, hCG pending if negative will proceed with NSAIDs as well.  Patient already on amoxicillin.  Disposition pending symptom control.    Electronically signed by: Gavino Carbajal M.D., 10/24/2023 9:01 PM    Patient " given oral antibiotics.  Patient given Naprosyn.  Patient given follow-up with oral surgery.  Patient tolerating oral intake.    Repeat physical exam benign.  I doubt any serious emergency process at this time.  Patient and/or family, friends given strict return precautions for worsening symptoms and care instructions. They have demonstrated understanding of discharge instructions through teach back mechanism. Advised PCP follow-up in 1-2 days.  Patient/family/friend expresses understanding and agrees to plan.    This dictation has been created using voice recognition software. I am continuously working with the software to minimize the number of voice recognition errors and I have made every attempt to manually correct the errors within my dictation. However errors  related to this voice recognition software may still exist and should be interpreted within the appropriate context.     Electronically signed by: Gavino Carbajal M.D., 10/24/2023 10:13 PM       DISPOSITION AND DISCUSSIONS    Escalation of care considered, and ultimately not performed:blood analysis, diagnostic imaging, and acute inpatient care management, however at this time, the patient is most appropriate for outpatient management      Decision tools and prescription drugs considered including, but not limited to: Pain Medications   over-the-counter pain medications are appropriate, narcotics not indicated at this time  .    FINAL DIAGNOSIS  1. Dental infection           Electronically signed by: Gavino Carbajal M.D., 10/24/2023 8:59 PM

## 2023-11-26 ENCOUNTER — HOSPITAL ENCOUNTER (EMERGENCY)
Facility: MEDICAL CENTER | Age: 28
End: 2023-11-26
Attending: EMERGENCY MEDICINE
Payer: MEDICAID

## 2023-11-26 VITALS
WEIGHT: 143.3 LBS | HEIGHT: 65 IN | OXYGEN SATURATION: 97 % | HEART RATE: 99 BPM | RESPIRATION RATE: 16 BRPM | SYSTOLIC BLOOD PRESSURE: 134 MMHG | BODY MASS INDEX: 23.88 KG/M2 | DIASTOLIC BLOOD PRESSURE: 95 MMHG | TEMPERATURE: 98.3 F

## 2023-11-26 DIAGNOSIS — J03.90 ACUTE TONSILLITIS, UNSPECIFIED ETIOLOGY: ICD-10-CM

## 2023-11-26 LAB — S PYO DNA SPEC NAA+PROBE: DETECTED

## 2023-11-26 PROCEDURE — 99283 EMERGENCY DEPT VISIT LOW MDM: CPT

## 2023-11-26 PROCEDURE — 87651 STREP A DNA AMP PROBE: CPT

## 2023-11-26 RX ORDER — AMOXICILLIN 500 MG/1
500 CAPSULE ORAL 3 TIMES DAILY
Qty: 30 CAPSULE | Refills: 0 | Status: ACTIVE | OUTPATIENT
Start: 2023-11-26

## 2023-11-26 RX ORDER — DEXAMETHASONE 4 MG/1
12 TABLET ORAL ONCE
Qty: 3 TABLET | Refills: 0 | Status: SHIPPED | OUTPATIENT
Start: 2023-11-26 | End: 2023-11-26

## 2023-11-26 ASSESSMENT — PAIN DESCRIPTION - PAIN TYPE: TYPE: ACUTE PAIN

## 2023-11-26 ASSESSMENT — FIBROSIS 4 INDEX: FIB4 SCORE: 0.69

## 2023-11-26 NOTE — DISCHARGE INSTRUCTIONS
Use Motrin Tylenol for discomfort.  You been placed on a steroid medication that can reduce swelling.  This will hopefully improve the symptoms over the next couple of days.    Strep swab is pending, [facility].

## 2023-11-26 NOTE — ED TRIAGE NOTES
"Chief Complaint   Patient presents with    Sore Throat     Began Friday 11/24, painful to swallow, redness/swelling observed in back of throat/tonsils, pt states hx of strep throat and states feels similar         Ambulated to triage with family for above complaint. States no difficulty breathing.    Pt educated of triage process and informed to contact staff if situation changes.    /80   Pulse 85   Temp 36.8 °C (98.2 °F) (Temporal) Comment: Simultaneous filing. User may not have seen previous data.  Resp 16   Ht 1.651 m (5' 5\")   Wt 65 kg (143 lb 4.8 oz)   SpO2 96%   BMI 23.85 kg/m²      "

## 2023-11-26 NOTE — ED PROVIDER NOTES
ER Provider Note    Scribed for Marcelino Dickinson D.O. by Nicky Chand. 11/26/2023  1:38 PM    Primary Care Provider: Medardo Mann M.D.    CHIEF COMPLAINT  Chief Complaint   Patient presents with    Sore Throat     Began Friday 11/24, painful to swallow, redness/swelling observed in back of throat/tonsils, pt states hx of strep throat and states feels similar        HPI/ROS    Ronit Tripathi is a 28 y.o. female who presents to the Emergency Department for a sore throat onset two days ago. The patient reports that her tonsils hurt and eating exacerbates her pain. She denies congestion, a fever, or a cough. Patient has a history of strep throat.     ROS as per HPI.    PAST MEDICAL HISTORY  Past Medical History:   Diagnosis Date    Anemia     Blood transfusion without reported diagnosis     2017    Chalazion of right upper eyelid 3/7/2017    Hypertension     Major depressive disorder 7/6/2022    Nausea and vomiting in pregnancy     2010,2014    Pregnant        SURGICAL HISTORY  History reviewed. No pertinent surgical history.    FAMILY HISTORY  Family History   Problem Relation Age of Onset    No Known Problems Mother     No Known Problems Father     No Known Problems Sister     No Known Problems Brother        SOCIAL HISTORY   reports that she has been smoking. She has a 0.5 pack-year smoking history. She has never used smokeless tobacco. She reports that she does not currently use drugs after having used the following drugs: Marijuana. She reports that she does not drink alcohol.    CURRENT MEDICATIONS  Discharge Medication List as of 11/26/2023  2:05 PM        CONTINUE these medications which have NOT CHANGED    Details   mirtazapine (REMERON) 15 MG Tab Take 1 Tablet by mouth every evening., Disp-30 Tablet, R-1, Normal      hydrOXYzine HCl (ATARAX) 25 MG Tab Take 1 Tablet by mouth 3 times a day as needed for Anxiety., Disp-30 Tablet, R-1, Normal      acetaminophen (TYLENOL) 500 MG Tab Take 2 Tabs by mouth every  "6 hours as needed for Mild Pain., Disp-60 Tab,R-0, Print Rx Paper             ALLERGIES  Patient has no known allergies.    PHYSICAL EXAM  BP (!) 134/95   Pulse 99   Temp 36.8 °C (98.3 °F) (Temporal)   Resp 16   Ht 1.651 m (5' 5\")   Wt 65 kg (143 lb 4.8 oz)   SpO2 97%   BMI 23.85 kg/m²     General: No acute distress. Muffled voice  HENT: Mucus membranes are moist. Tonsils are enlarged and exudates and erythema present, No airway obstruction  Chest: Lungs have even and unlabored respirations, Clear to auscultation.   Cardiovascular: Regular rate and regular rhythm, No peripheral cyanosis.  Abdomen: Non distended.  Neuro: Awake, Conversive, Able to relay recent events.  Psychiatric: Calm and cooperative.     INITIAL ASSESSMENT  Patient with obvious tonsillitis. Swabs were obtained due to clinical findings. I will start on antibiotics and steroids.    ED Observation Status? No; Patient does not meet criteria for ED Observation.     DIAGNOSTIC STUDIES    Labs:   Results for orders placed or performed during the hospital encounter of 11/26/23   Group A Strep by PCR    Specimen: Throat   Result Value Ref Range    Group A Strep by PCR DETECTED (A) Not Detected        COURSE & MEDICAL DECISION MAKING     COURSE AND PLAN  1:38 PM - Patient seen and examined at bedside. Discussed plan of care, including labs. Patient agrees to the plan of care. Ordered for Group A Strep to evaluate her symptoms.     1:57 PM - The patient will be discharged with Amoxil 500mg, Decadron 4mg and should return if symptoms worsen or if new symptoms arise. The patient understands and agrees to plan.      ED Summary: Patient has exudates and tonsillar erythema with enlargement the patient was empirically started on antibiotics while the strep test was pending.  The strep test is negative however she will maintain antibiotic treatment.    Decision tools and prescription drugs considered including, but not limited to: Amoxil 500mg, Decadron " 4mg      The patient will return for new or worsening symptoms and is stable at the time of discharge.    The patient is referred to a primary physician for blood pressure management, diabetic screening, and for all other preventative health concerns.    DISPOSITION:  Patient will be discharged home in stable condition.    FOLLOW UP:  Medardo Mann M.D.  [address] [facility] [address]  [phone]    In 1 week        OUTPATIENT MEDICATIONS:  Discharge Medication List as of 11/26/2023  2:05 PM        START taking these medications    Details   amoxicillin (AMOXIL) 500 MG Cap Take 1 Capsule by mouth 3 times a day., Disp-30 Capsule, R-0, Normal      dexamethasone (DECADRON) 4 MG Tab Take 3 Tablets by mouth Once for 1 dose., Disp-3 Tablet, R-0, Normal              FINAL DIAGNOSIS  1. Acute tonsillitis, unspecified etiology        Nicky BLANKENSHIP (Scribe), am scribing for, and in the presence of, Marcelino Dickinson D.O..    Electronically signed by: Nicky Chand (Scribe), 11/26/2023    Marcelino BLANKENSHIP D.O. personally performed the services described in this documentation, as scribed by Nicky Chand in my presence, and it is both accurate and complete.     The note accurately reflects work and decisions made by me.  Marcelino Dickinson D.O.  11/26/2023  4:12 PM

## 2025-01-22 ENCOUNTER — HOSPITAL ENCOUNTER (EMERGENCY)
Facility: MEDICAL CENTER | Age: 30
End: 2025-01-22
Attending: EMERGENCY MEDICINE

## 2025-01-22 VITALS
TEMPERATURE: 98.5 F | DIASTOLIC BLOOD PRESSURE: 95 MMHG | SYSTOLIC BLOOD PRESSURE: 149 MMHG | OXYGEN SATURATION: 94 % | WEIGHT: 165 LBS | RESPIRATION RATE: 18 BRPM | BODY MASS INDEX: 27.49 KG/M2 | HEIGHT: 65 IN | HEART RATE: 74 BPM

## 2025-01-22 DIAGNOSIS — R03.0 ELEVATED BLOOD PRESSURE READING: ICD-10-CM

## 2025-01-22 DIAGNOSIS — M54.12 CERVICAL RADICULOPATHY: ICD-10-CM

## 2025-01-22 PROCEDURE — 99282 EMERGENCY DEPT VISIT SF MDM: CPT

## 2025-01-22 RX ORDER — METOPROLOL TARTRATE 25 MG/1
12.5 TABLET, FILM COATED ORAL 2 TIMES DAILY
Qty: 60 TABLET | Refills: 0 | Status: SHIPPED | OUTPATIENT
Start: 2025-01-22

## 2025-01-22 NOTE — ED TRIAGE NOTES
Chief Complaint   Patient presents with    Sent by MD     Ambulated to triage sent by [facility] clinic for elevated bp. States highest BP of 190/110. Denies chest pain/dizziness/sob. Been having intermittent numbness and pain in left arm x 2 weeks and has tingling sensation in mid back. Denies head ache/nausea but reports vision changes 2 days ago. Speech is clear. No facial droop.     Has hx of HTN. Not on any medication. States that she never went back to her doctor.  Educated on triage process. Instructed to notify staff for any worsening symptoms. Denies any recent travel. Denies exposure to known covid positive patients. Denies any respiratory symptoms.

## 2025-01-22 NOTE — ED PROVIDER NOTES
ED Provider Note    CHIEF COMPLAINT  Chief Complaint   Patient presents with    Sent by MD     Ambulated to triage sent by Methadone clinic for elevated bp. States highest BP of 190/110. Denies chest pain/dizziness/sob. Been having intermittent numbness and pain in left arm x 2 weeks and has tingling sensation in mid back. Denies head ache/nausea but reports vision changes 2 days ago. Speech is clear. No facial droop.       EXTERNAL RECORDS REVIEWED  Other none    HPI/ROS  LIMITATION TO HISTORY   Select: : None    OUTSIDE HISTORIAN(S):  none    Ronit Tripathi is a 29 y.o. female smoker with history of hypertension who presents for elevated blood pressure.    Patient was referred to the ER from the methadone clinic where her blood pressure was noted to be elevated.  There was 190/110.    Patient reports mid upper back pain that is sharp, intermittent.  When she lays flat/sleeps she notices that her left forearm and upper arm feel numb.  Patient also reports tightness in her right scapular area at times.    Patient states that she has recently felt fatigued.  The last time she checked her blood pressure was 2 months ago and it was in the 140s.  She has been advised to take blood pressure medication in the past but for insurance reasons has not been able to continue it.    Patient reports history of anxiety.  She is a single mom of 5 kids.  She shares that she was taking a friend's Vicodin for toothache that was unknowingly laced with fentanyl a couple of years ago.  When she discontinued the Vicodin, she began feeling sick and realized that she was withdrawing from the fentanyl.    Patient denies chest pain, shortness of breath, severe headache, leg swelling, weakness.    She feels eyestrain occasionally and feels she might need to get her vision checked.    Patient states labs are regularly performed at the methadone clinic.  She denies history of anemia or kidney problems.      PAST MEDICAL HISTORY   has a past  "medical history of Anemia, Blood transfusion without reported diagnosis, Chalazion of right upper eyelid (3/7/2017), Hypertension, Major depressive disorder (7/6/2022), Nausea and vomiting in pregnancy, and Pregnant.    SURGICAL HISTORY  patient denies any surgical history    FAMILY HISTORY  Family History   Problem Relation Age of Onset    No Known Problems Mother     No Known Problems Father     No Known Problems Sister     No Known Problems Brother        SOCIAL HISTORY  Social History     Tobacco Use    Smoking status: Every Day     Current packs/day: 0.50     Average packs/day: 0.5 packs/day for 1 year (0.5 ttl pk-yrs)     Types: Cigarettes    Smokeless tobacco: Never    Tobacco comments:     Stopped smoking once she found out she was pregnant   Vaping Use    Vaping status: Never Used   Substance and Sexual Activity    Alcohol use: No    Drug use: Not Currently     Types: Marijuana    Sexual activity: Not Currently     Partners: Male     Comment: None      Patient denies cocaine and methamphetamine use    CURRENT MEDICATIONS  Home Medications       Reviewed by Cammie Levi R.N. (Registered Nurse) on 01/22/25 at 0931  Med List Status: Partial     Medication Last Dose Status   acetaminophen (TYLENOL) 500 MG Tab  Active   amoxicillin (AMOXIL) 500 MG Cap  Active   hydrOXYzine HCl (ATARAX) 25 MG Tab  Active   mirtazapine (REMERON) 15 MG Tab  Active                    ALLERGIES  No Known Allergies    PHYSICAL EXAM  VITAL SIGNS: BP (!) 176/110   Pulse 99   Temp 36.4 °C (97.6 °F) (Temporal)   Resp 16   Ht 1.651 m (5' 5\")   Wt 74.8 kg (165 lb)   LMP 01/15/2025   SpO2 97%   BMI 27.46 kg/m²    General:  WDWN female, nontoxic appearing in NAD; A+Ox3; V/S as above; elevated blood pressure  Skin: warm and dry; good color; no rash  HEENT: NCAT; EOMs intact; PERRL; no scleral icterus   Neck: FROM  Cardiovascular: Regular heart rate and rhythm.  No murmurs, rubs, or gallops  Lungs: No respiratory distress or " "tachypnea; Clear to auscultation with good air movement bilaterally.  No wheezes, rhonchi, or rales.   Extremities: SAINI x 4; no e/o trauma; no pedal edema  Neurologic: CNs III-XII grossly intact; speech clear  Psychiatric: Appropriate affect, normal mood      EKG/LABS  none    RADIOLOGY/PROCEDURES   none      COURSE & MEDICAL DECISION MAKING    ASSESSMENT, COURSE AND PLAN  Care Narrative: Patient is a 29-year-old female who presents for elevated blood pressure reading noted at the methadone clinic today.  Patient reports history of hypertension but does not take medications.  She also reported left arm pain \"zingers\" that is sharp and intermittent and increased when she lays down.  I do not feel this is an ACS equivalent or CVA symptom.  This sounds like a radiculopathy.  Patient is not having any symptoms or signs consistent with hypertensive urgency.  I do not feel that labs are indicated today.  She actually gets labs done at the methadone clinic quite regularly and states they are always normal.  CT imaging not indicated.  I will start her on an antihypertensive given that she has been told that she should be on 1 in the past.  I am choosing metoprolol hoping that this may help her anxiety to some extent as well.  Patient is amenable to this plan and will follow-up with a PCP.  She will return for worsening symptoms.        DISPOSITION AND DISCUSSIONS    Escalation of care considered, and ultimately not performed:Laboratory analysis and diagnostic imaging    Barriers to care at this time, including but not limited to: Patient does not have established PCP.       FINAL DIAGNOSIS  1. Elevated blood pressure reading    2. Cervical radiculopathy         Electronically signed by: Radha Arceo M.D., 1/22/2025 10:18 AM      "

## 2025-01-22 NOTE — DISCHARGE INSTRUCTIONS
Take metoprolol 12.5 mg every 12 hours (twice daily) for blood pressure control.     Return to the ER for chest pain, severe headache, dizziness, fainting, shortness of breath, or other concerns    Take ibuprofen for possible disc issue and inflammation in your spine/muscles/nerves.    Establish care with a primary care provider who can order labs and monitor your blood pressure medications.  They can also possibly order physical therapy that might help with your back and left arm symptoma    Measure your blood pressureIn the morning in the evening and keep a log that you can bring to your primary care doctor

## 2025-06-02 ENCOUNTER — APPOINTMENT (OUTPATIENT)
Dept: RADIOLOGY | Facility: MEDICAL CENTER | Age: 30
End: 2025-06-02
Attending: EMERGENCY MEDICINE
Payer: MEDICAID

## 2025-06-02 ENCOUNTER — HOSPITAL ENCOUNTER (EMERGENCY)
Facility: MEDICAL CENTER | Age: 30
End: 2025-06-02
Attending: EMERGENCY MEDICINE
Payer: MEDICAID

## 2025-06-02 VITALS
OXYGEN SATURATION: 95 % | HEART RATE: 70 BPM | BODY MASS INDEX: 27.18 KG/M2 | DIASTOLIC BLOOD PRESSURE: 94 MMHG | WEIGHT: 163.14 LBS | RESPIRATION RATE: 16 BRPM | HEIGHT: 65 IN | SYSTOLIC BLOOD PRESSURE: 139 MMHG | TEMPERATURE: 98 F

## 2025-06-02 DIAGNOSIS — R10.2 PELVIC PAIN: Primary | ICD-10-CM

## 2025-06-02 LAB
ALBUMIN SERPL BCP-MCNC: 4.3 G/DL (ref 3.2–4.9)
ALBUMIN/GLOB SERPL: 1.2 G/DL
ALP SERPL-CCNC: 73 U/L (ref 30–99)
ALT SERPL-CCNC: 20 U/L (ref 2–50)
ANION GAP SERPL CALC-SCNC: 9 MMOL/L (ref 7–16)
APPEARANCE UR: CLEAR
AST SERPL-CCNC: 26 U/L (ref 12–45)
BACTERIA #/AREA URNS HPF: ABNORMAL /HPF
BASOPHILS # BLD AUTO: 0.7 % (ref 0–1.8)
BASOPHILS # BLD: 0.06 K/UL (ref 0–0.12)
BILIRUB SERPL-MCNC: 0.3 MG/DL (ref 0.1–1.5)
BILIRUB UR QL STRIP.AUTO: NEGATIVE
BUN SERPL-MCNC: 7 MG/DL (ref 8–22)
CALCIUM ALBUM COR SERPL-MCNC: 9.1 MG/DL (ref 8.5–10.5)
CALCIUM SERPL-MCNC: 9.3 MG/DL (ref 8.5–10.5)
CASTS URNS QL MICRO: ABNORMAL /LPF (ref 0–2)
CHLORIDE SERPL-SCNC: 105 MMOL/L (ref 96–112)
CO2 SERPL-SCNC: 26 MMOL/L (ref 20–33)
COLOR UR: YELLOW
CREAT SERPL-MCNC: 0.71 MG/DL (ref 0.5–1.4)
EOSINOPHIL # BLD AUTO: 0.18 K/UL (ref 0–0.51)
EOSINOPHIL NFR BLD: 2.1 % (ref 0–6.9)
EPITHELIAL CELLS 1715: ABNORMAL /HPF (ref 0–5)
ERYTHROCYTE [DISTWIDTH] IN BLOOD BY AUTOMATED COUNT: 37.4 FL (ref 35.9–50)
GFR SERPLBLD CREATININE-BSD FMLA CKD-EPI: 116 ML/MIN/1.73 M 2
GLOBULIN SER CALC-MCNC: 3.5 G/DL (ref 1.9–3.5)
GLUCOSE SERPL-MCNC: 93 MG/DL (ref 65–99)
GLUCOSE UR STRIP.AUTO-MCNC: NEGATIVE MG/DL
HCG SERPL QL: NEGATIVE
HCT VFR BLD AUTO: 45.6 % (ref 37–47)
HGB BLD-MCNC: 15.2 G/DL (ref 12–16)
HYALINE CAST   1831: PRESENT /LPF
IMM GRANULOCYTES # BLD AUTO: 0.02 K/UL (ref 0–0.11)
IMM GRANULOCYTES NFR BLD AUTO: 0.2 % (ref 0–0.9)
KETONES UR STRIP.AUTO-MCNC: ABNORMAL MG/DL
LEUKOCYTE ESTERASE UR QL STRIP.AUTO: ABNORMAL
LIPASE SERPL-CCNC: 28 U/L (ref 11–82)
LYMPHOCYTES # BLD AUTO: 3.31 K/UL (ref 1–4.8)
LYMPHOCYTES NFR BLD: 38.8 % (ref 22–41)
MCH RBC QN AUTO: 27.9 PG (ref 27–33)
MCHC RBC AUTO-ENTMCNC: 33.3 G/DL (ref 32.2–35.5)
MCV RBC AUTO: 83.8 FL (ref 81.4–97.8)
MICRO URNS: ABNORMAL
MONOCYTES # BLD AUTO: 0.51 K/UL (ref 0–0.85)
MONOCYTES NFR BLD AUTO: 6 % (ref 0–13.4)
MUCOUS THREADS URNS QL MICRO: PRESENT /HPF
NEUTROPHILS # BLD AUTO: 4.44 K/UL (ref 1.82–7.42)
NEUTROPHILS NFR BLD: 52.2 % (ref 44–72)
NITRITE UR QL STRIP.AUTO: NEGATIVE
NRBC # BLD AUTO: 0 K/UL
NRBC BLD-RTO: 0 /100 WBC (ref 0–0.2)
PH UR STRIP.AUTO: 6.5 [PH] (ref 5–8)
PLATELET # BLD AUTO: 282 K/UL (ref 164–446)
PMV BLD AUTO: 9.9 FL (ref 9–12.9)
POTASSIUM SERPL-SCNC: 3.5 MMOL/L (ref 3.6–5.5)
PROT SERPL-MCNC: 7.8 G/DL (ref 6–8.2)
PROT UR QL STRIP: NEGATIVE MG/DL
RBC # BLD AUTO: 5.44 M/UL (ref 4.2–5.4)
RBC # URNS HPF: ABNORMAL /HPF (ref 0–2)
RBC UR QL AUTO: NEGATIVE
SODIUM SERPL-SCNC: 140 MMOL/L (ref 135–145)
SP GR UR STRIP.AUTO: 1.03
UROBILINOGEN UR STRIP.AUTO-MCNC: 1 EU/DL
WBC # BLD AUTO: 8.5 K/UL (ref 4.8–10.8)
WBC #/AREA URNS HPF: ABNORMAL /HPF

## 2025-06-02 PROCEDURE — 83690 ASSAY OF LIPASE: CPT

## 2025-06-02 PROCEDURE — 96375 TX/PRO/DX INJ NEW DRUG ADDON: CPT

## 2025-06-02 PROCEDURE — 36415 COLL VENOUS BLD VENIPUNCTURE: CPT

## 2025-06-02 PROCEDURE — 76830 TRANSVAGINAL US NON-OB: CPT

## 2025-06-02 PROCEDURE — 80053 COMPREHEN METABOLIC PANEL: CPT

## 2025-06-02 PROCEDURE — 84703 CHORIONIC GONADOTROPIN ASSAY: CPT

## 2025-06-02 PROCEDURE — 99284 EMERGENCY DEPT VISIT MOD MDM: CPT

## 2025-06-02 PROCEDURE — 700111 HCHG RX REV CODE 636 W/ 250 OVERRIDE (IP): Mod: JZ | Performed by: EMERGENCY MEDICINE

## 2025-06-02 PROCEDURE — 85025 COMPLETE CBC W/AUTO DIFF WBC: CPT

## 2025-06-02 PROCEDURE — 81001 URINALYSIS AUTO W/SCOPE: CPT

## 2025-06-02 PROCEDURE — 96374 THER/PROPH/DIAG INJ IV PUSH: CPT

## 2025-06-02 RX ORDER — ONDANSETRON 2 MG/ML
4 INJECTION INTRAMUSCULAR; INTRAVENOUS ONCE
Status: COMPLETED | OUTPATIENT
Start: 2025-06-02 | End: 2025-06-02

## 2025-06-02 RX ORDER — SULFAMETHOXAZOLE AND TRIMETHOPRIM 800; 160 MG/1; MG/1
1 TABLET ORAL EVERY 12 HOURS
Qty: 6 TABLET | Refills: 0 | Status: ACTIVE | OUTPATIENT
Start: 2025-06-02 | End: 2025-06-05

## 2025-06-02 RX ORDER — MORPHINE SULFATE 4 MG/ML
4 INJECTION INTRAVENOUS ONCE
Status: COMPLETED | OUTPATIENT
Start: 2025-06-02 | End: 2025-06-02

## 2025-06-02 RX ADMIN — ONDANSETRON 4 MG: 2 INJECTION INTRAMUSCULAR; INTRAVENOUS at 12:54

## 2025-06-02 RX ADMIN — MORPHINE SULFATE 4 MG: 4 INJECTION INTRAVENOUS at 12:53

## 2025-06-02 ASSESSMENT — PAIN DESCRIPTION - PAIN TYPE
TYPE: ACUTE PAIN
TYPE: ACUTE PAIN

## 2025-06-02 NOTE — ED TRIAGE NOTES
"Ronit Yash Deuce  30 y.o. female  Chief Complaint   Patient presents with    Abdominal Cramping     Reports 7/10 \"cramping\" to RLQ X 1 wk. Reports pain has gotten progressively worse    Constipation     Reports last BM 5/29  Pt reports she is passing gas    Low Back Pain     Reports 9/10 \"sharp\" low back pain X 1 wk       Pt amb to triage with steady gait for above complaint.   Pt is alert and oriented, speaking in full sentences, follows commands and responds appropriately to questions. Not in any apparent distress. Respirations are even and unlabored.  Pt placed in line for blood draw. Pt educated on triage process. Pt encouraged to alert staff for any changes.    "

## 2025-06-02 NOTE — DISCHARGE INSTRUCTIONS
No evidence of abnormality on your blood work or ultrasound  Suggest follow-up with OB/GYN as you may have some endometriosis that is developing  Suggest Tylenol and Motrin at home for pain control  You may have a UTI which we will treat for the next 3 days

## 2025-06-02 NOTE — ED NOTES
Bedside report received from off going RN/tech: luis alfredo, assumed care of patient.  POC discussed with patient. Call light within reach, all needs addressed at this time.       Fall risk interventions in place: Not Applicable (all applicable per [place] Fall risk assessment)   Continuous monitoring: Cardiac Leads, Pulse Ox, or Blood Pressure  IVF/IV medications: Not Applicable   Oxygen: Room Air  Bedside sitter: Not Applicable   Isolation: Not Applicable

## 2025-06-02 NOTE — ED NOTES
Pt discharged to home. Pt was given follow up instructions and prescriptions for bactrim. All lines removed prior to discharge. Pt verbalized understanding of all instructions for discharge and is ambulatory out of ED with steady gait. AOx4

## 2025-06-02 NOTE — ED PROVIDER NOTES
"ER Provider Note    Scribed for Adiel Jung M.d. by Willy [place]. 6/2/2025  11:28 AM    Primary Care Provider: Pcp Pt States None    CHIEF COMPLAINT   Chief Complaint   Patient presents with    Abdominal Cramping     Reports 7/10 \"cramping\" to RLQ X 1 wk. Reports pain has gotten progressively worse    Constipation     Reports last BM 5/29  Pt reports she is passing gas    Low Back Pain     Reports 9/10 \"sharp\" low back pain X 1 wk     HPI/ROS  LIMITATION TO HISTORY   Select: : None  OUTSIDE HISTORIAN(S):  None    Ronit Tripathi is a 30 y.o. female who presents to the ED complaining of progressively worsening lower abdominal pain onset 1 week ago. She also mentions associated bloating, cramping, and constipation. She mentions that the pain occasionally radiates to her lower back. She affirms that she is passing gas and reports her last bowel movement as 5/29/25. Patient reports intermittent periods, with her last menstrual period at the end of May. She states that is has been less than a week, since she stopped bleeding. She denies any vaginal discharge, but mentions significant cramping since the end of her period. She denies vomiting or nausea.    PAST MEDICAL HISTORY  Past Medical History[1]    SURGICAL HISTORY  Past Surgical History[2]    FAMILY HISTORY  Family History   Problem Relation Age of Onset    No Known Problems Mother     No Known Problems Father     No Known Problems Sister     No Known Problems Brother        SOCIAL HISTORY   reports that she has been smoking. She has a 0.5 pack-year smoking history. She has never used smokeless tobacco. She reports that she does not currently use drugs after having used the following drugs: Marijuana. She reports that she does not drink alcohol.    CURRENT MEDICATIONS  Previous Medications    ACETAMINOPHEN (TYLENOL) 500 MG TAB    Take 2 Tabs by mouth every 6 hours as needed for Mild Pain.    AMOXICILLIN (AMOXIL) 500 MG CAP    Take 1 Capsule by mouth 3 times a " "day.    HYDROXYZINE HCL (ATARAX) 25 MG TAB    Take 1 Tablet by mouth 3 times a day as needed for Anxiety.    METOPROLOL TARTRATE (LOPRESSOR) 25 MG TAB    Take 0.5 Tablets by mouth 2 times a day.    MIRTAZAPINE (REMERON) 15 MG TAB    Take 1 Tablet by mouth every evening.       ALLERGIES  Patient has no known allergies.    PHYSICAL EXAM  BP (!) 144/88   Pulse 78   Temp 36.8 °C (98.3 °F) (Temporal)   Resp 20   Ht 1.651 m (5' 5\")   Wt 74 kg (163 lb 2.3 oz)   SpO2 97%   BMI 27.15 kg/m²   Constitutional: Well developed, Well nourished, No acute distress, Non-toxic appearance.   HENT: Normocephalic, Atraumatic, Bilateral external ears normal, Oropharynx is clear mucous membranes are moist. No oral exudates or nasal discharge.   Eyes: Pupils are equal round and reactive, EOMI, Conjunctiva normal, No discharge.   Neck: Normal range of motion, No tenderness, Supple, No stridor. No meningismus.  Lymphatic: No lymphadenopathy noted.   Cardiovascular: Regular rate and rhythm without murmur rub or gallop.  Thorax & Lungs: Clear breath sounds bilaterally without wheezes, rhonchi or rales. There is no chest wall tenderness.   Abdomen: Soft non-tender non-distended. There is no rebound or guarding. No organomegaly is appreciated. Bowel sounds are normal.  Pelvic: Left paraovarian tenderness.  Skin: Normal without rash.   Back: No CVA or spinal tenderness.   Extremities: Intact distal pulses, No edema, No tenderness, No cyanosis, No clubbing. Capillary refill is less than 2 seconds.  Musculoskeletal: Good range of motion in all major joints. No tenderness to palpation or major deformities noted.   Neurologic: Alert & oriented x 3, Normal motor function, Normal sensory function, No focal deficits noted. Reflexes are normal.  Psychiatric: Affect normal, Judgment normal, Mood normal. There is no suicidal ideation or patient reported hallucinations.     DIAGNOSTIC STUDIES    EKG/LABS  Labs Reviewed   CBC WITH DIFFERENTIAL - " Abnormal; Notable for the following components:       Result Value    RBC 5.44 (*)     All other components within normal limits   COMP METABOLIC PANEL - Abnormal; Notable for the following components:    Potassium 3.5 (*)     Bun 7 (*)     All other components within normal limits   URINALYSIS,CULTURE IF INDICATED - Abnormal; Notable for the following components:    Ketones Trace (*)     Leukocyte Esterase Moderate (*)     All other components within normal limits   URINE MICROSCOPIC (W/UA) - Abnormal; Notable for the following components:    RBC 3-5 (*)     Bacteria Many (*)     Epithelial Cells 11-20 (*)     All other components within normal limits   LIPASE   HCG QUAL SERUM   ESTIMATED GFR     COURSE & MEDICAL DECISION MAKING     ASSESSMENT, COURSE AND PLAN  Care Narrative: Patient presents with very focal pain in the left pelvis around her left ovary and she said it was quite uncomfortable but no vaginal discharge or bleeding and this pain is coming just a few days after she finished her last period so I doubt shahbaz.    11:59 AM - The patient will be medicated with Zofran 4 mg and Morphine 4 mg.  The seem to help her pain.  Did perform imaging as well as blood work    Laboratory evaluation reveals no leukocytosis, shift, anemia, electrolyte derangements or acidosis.  Urinalysis is positive with bacteria and moderate leuks with epithelial cells.  This may represent a dirty specimen but given the location of symptoms I think I will treat empirically.    Ultrasound shows no evidence of ovarian pathology and beta-hCG is negative ruling out pregnancy/ectopic.    Patient understands plan of care and discharge including pain control with OTC medications and following up with OB/GYN in case her pain persist as she may have endometriosis although difficult to say at this time    DISPOSITION AND DISCUSSIONS  Barriers to care at this time, including but not limited to: Patient does not have established PCP.      Decision tools and prescription drugs considered including, but not limited to: Antibiotics patient given Keflex x 3 days for what I believe is cystitis producing some of her symptoms.    FINAL DIANGOSIS  1. Pelvic pain       Willy BLANKENSHIP ([place]), am scribing for, and in the presence of, Adiel Jung M.D..    Electronically signed by: Willy Kent ([place]), 6/2/2025    Adiel BLANKENSHIP M.D. personally performed the services described in this documentation, as scribed by Willy Kent in my presence, and it is both accurate and complete.      The note accurately reflects work and decisions made by me.  Adiel Jung M.D.  6/2/2025  1:47 PM         [1]   Past Medical History:  Diagnosis Date    Anemia     Blood transfusion without reported diagnosis     2017    Chalazion of right upper eyelid 3/7/2017    Hypertension     Major depressive disorder 7/6/2022    Nausea and vomiting in pregnancy     2010,2014    Pregnant    [2] No past surgical history on file.

## 2025-06-12 NOTE — Clinical Note
REFERRAL APPROVAL NOTICE         Sent on June 12, 2025                   Ronit Tripathi  [address] [place] [address]                   Dear MsEloise Deuce,    After a careful review of the medical information and benefit coverage, [facility] has processed your referral. See below for additional details.    If applicable, you must be actively enrolled with your insurance for coverage of the authorized service. If you have any questions regarding your coverage, please contact your insurance directly.    REFERRAL INFORMATION   Referral #:  [id number]  Referred-To Department    Referred-By Provider:  Gynecology    Adeil Jung M.D.   [address] Emergency Room  [address]  [facility] [address] [phone] [address]  [place] [id number] [phone]    Referral Start Date:  06/02/2025  Referral End Date:   06/02/2026             SCHEDULING  If you do not already have an appointment, please call [phone] to make an appointment.     MORE INFORMATION  If you do not already have a [employer] [facility] account, sign up at: [email or web address]  You can access your medical information, make appointments, see lab results, billing information, and more.  If you have questions regarding this referral, please contact  the [facility] Referrals department at:             [phone]. Monday - Friday 8:00AM - 5:00PM.     Sincerely,    [facility]